# Patient Record
Sex: FEMALE | Race: WHITE | Employment: OTHER | URBAN - METROPOLITAN AREA
[De-identification: names, ages, dates, MRNs, and addresses within clinical notes are randomized per-mention and may not be internally consistent; named-entity substitution may affect disease eponyms.]

---

## 2017-03-29 RX ORDER — GEMFIBROZIL 600 MG/1
TABLET, FILM COATED ORAL
Qty: 180 TAB | Refills: 1 | Status: SHIPPED | OUTPATIENT
Start: 2017-03-29 | End: 2017-09-12 | Stop reason: SDUPTHER

## 2017-03-29 RX ORDER — MONTELUKAST SODIUM 10 MG/1
TABLET ORAL
Qty: 90 TAB | Refills: 1 | Status: SHIPPED | OUTPATIENT
Start: 2017-03-29 | End: 2017-09-15 | Stop reason: SDUPTHER

## 2017-03-30 DIAGNOSIS — J44.9 CHRONIC AIRWAY OBSTRUCTION, NOT ELSEWHERE CLASSIFIED: ICD-10-CM

## 2017-03-30 RX ORDER — FLUTICASONE PROPIONATE AND SALMETEROL 250; 50 UG/1; UG/1
1 POWDER RESPIRATORY (INHALATION) EVERY 12 HOURS
Qty: 3 INHALER | Refills: 3 | Status: SHIPPED | OUTPATIENT
Start: 2017-03-30 | End: 2019-04-16 | Stop reason: SDUPTHER

## 2017-06-21 ENCOUNTER — OFFICE VISIT (OUTPATIENT)
Dept: FAMILY MEDICINE CLINIC | Age: 72
End: 2017-06-21

## 2017-06-21 DIAGNOSIS — Z00.00 ROUTINE GENERAL MEDICAL EXAMINATION AT A HEALTH CARE FACILITY: ICD-10-CM

## 2017-06-21 DIAGNOSIS — Z13.39 SCREENING FOR ALCOHOLISM: ICD-10-CM

## 2017-06-30 VITALS
SYSTOLIC BLOOD PRESSURE: 129 MMHG | BODY MASS INDEX: 38.99 KG/M2 | HEART RATE: 56 BPM | WEIGHT: 203 LBS | DIASTOLIC BLOOD PRESSURE: 71 MMHG

## 2017-06-30 RX ORDER — LYSINE HCL 500 MG
1 TABLET ORAL DAILY
COMMUNITY
End: 2020-06-24 | Stop reason: ALTCHOICE

## 2017-06-30 NOTE — PATIENT INSTRUCTIONS
Medicare Wellness Visit, Female    The best way to live healthy is to have a healthy lifestyle by eating a well-balanced diet, exercising regularly, limiting alcohol and stopping smoking. Regular physical exams and screening tests are another way to keep healthy. Preventive exams provided by your health care provider can find health problems before they become diseases or illnesses. Preventive services including immunizations, screening tests, monitoring and exams can help you take care of your own health. All people over age 72 should have a pneumovax  and and a prevnar shot to prevent pneumonia. These are once in a lifetime unless you and your provider decide differently. It appears you haven't had either of these and should consider prevnar 13 now and then ppsv 23 a year later. All people over 65 should have a yearly flu shot and a tetanus vaccine every 10 years. You have had your flu vaccine this year and should get annually. You had Tdap in 2013 and will need a booster in 2023. A bone mass density to screen for osteoporosis or thinning of the bones should be done every 2 years after 65. [facility] you had a normal dexa scan in Aug 2016. Screening for diabetes mellitus with a blood sugar test should be done every year. Your BG has been elevated as we discussed the last two times it was checked. Recommend an A1c next visit in August with PCP. Glaucoma is a disease of the eye due to increased ocular pressure that can lead to blindness and it should be done every year by an eye professional. It appears you are due for an eye exam and should make this appt, then have results sent to us at [facility] / Dr. Jovon Samaniego office. Cardiovascular screening tests that check for elevated lipids (fatty part of blood) which can lead to heart disease and strokes should be done every 5 years. You are on cholesterol medication so this may be done more frequently.  You need to follow up with Dr. Fortunato Schwab about not taking the pravastatin at your visit. Your cholesterol in December 2015 was stable although you had a low good cholesterol - exercise can help this. Colorectal screening that evaluates for blood or polyps in your colon should be done yearly as a stool test or every five years as a flexible sigmoidoscope or every 10 years as a colonoscopy up to age 76. You had a colonoscopy 2007 and should d/w Dr. Fortunato Schwab if you need to repeat now in 2017. Breast cancer screening with a mammogram is recommended biennially  for women age 54-69. You had a mammogram in Nov 2016 and will be due again in Nov 2018. A shingles vaccine is also recommended once in a lifetime after age 61. This is a vaccine for you to consider. Your Medicare Wellness Exam is recommended annually.       Thank you

## 2017-06-30 NOTE — PROGRESS NOTES
Dr. Aleida Rios Referred KW, 1945, a 67 y.o. female for a Medicare Annual Wellness Visit (AWV). She presents to visit feeling well overall and has some papers with her regarding her sleep study and a life line screening done on 8/26/16. Will review and have scanned for chart for next PCP visit. Pt does not have an upcoming PCP visit and it has been since May of last year, so asked her to make an appt with him after today's visit. In review of meds, pt stopped taking pravastatin as it caused muscle aches and when she stopped the medicine they went away. Using CPAP for sleep apnea. Record indicates pt due for eye exam - d/w patient. Looks like she had a dexa as a part of the lifescan and it was normal.     This is a Subsequent Medicare Annual Wellness Visit providing Personalized Prevention Plan Services (PPPS) (Performed 12 months after initial AWV and PPPS )    I have reviewed the patient's medical history in detail and updated the computerized patient record.      History     Past Medical History:   Diagnosis Date    Allergic rhinitis, cause unspecified 1/17/2011    Asthma     Asthma     Bladder cancer ([facility] [address] 7/7/2010    Chronic airway obstruction, not elsewhere classified 1/17/2011    Depression 1/17/2011    Diverticulosis 7/7/2010    Encounter for long-term (current) use of other medications 1/17/2011    Essential hypertension, benign 11/30/2012    Hypercholesterolemia     Mixed hyperlipidemia 1/17/2011    Sleep apnea     CPAP      Past Surgical History:   Procedure Laterality Date    ENDOSCOPY, COLON, DIAGNOSTIC      HX CARPAL TUNNEL RELEASE      right    HX CHOLECYSTECTOMY      HX CYSTOCELE REPAIR      HX HYSTERECTOMY      HX ORTHOPAEDIC      left thumb surgery    HX UROLOGICAL      resection of bladder tumor; bladder sling    REPAIR OF RECTOCELE       Current Outpatient Prescriptions   Medication Sig Dispense Refill    fluticasone-salmeterol (ADVAIR DISKUS) 250-50 mcg/dose diskus inhaler Take 1 Puff by inhalation every twelve (12) hours. 3 Inhaler 3    montelukast (SINGULAIR) 10 mg tablet Take 1 tablet by mouth  daily 90 Tab 1    gemfibrozil (LOPID) 600 mg tablet Take 1 tablet by mouth  twice a day 180 Tab 1    clonazePAM (KLONOPIN) 1 mg tablet Take 1 Tab by mouth daily as needed. 90 Tab 1    sulfacetamide (BLEPH-10) 10 % ophthalmic solution Instill one drop in both  eyes every 3 hours for 10  days Equivalent to Bleph-10 5 mL 0    atenolol (TENORMIN) 50 mg tablet Take 1 and 1/2 tablets by  mouth daily 135 Tab 2    DULoxetine (CYMBALTA) 30 mg capsule Take 1 capsule by mouth  daily 90 Cap 2    omega-3 fatty acids-vitamin e (FISH OIL) 1,000 mg Cap Take 1 Cap by mouth two (2) times a day.  lysine (L-LYSINE) 500 mg Tab tablet Take  by mouth three (3) times daily (with meals).  aspirin 81 mg chewable tablet Take 81 mg by mouth daily.  fexofenadine (ALLEGRA) 180 mg tablet Take 180 mg by mouth daily.  albuterol (PROVENTIL, VENTOLIN) 90 mcg/Actuation inhaler Take 2 Puffs by inhalation every six (6) hours as needed.       pravastatin (PRAVACHOL) 80 mg tablet Take 1 tablet by mouth  nightly 90 Tab 1     Allergies   Allergen Reactions    Ciprofloxacin Unknown (comments)    Hydrocodone Unknown (comments)    Iodine Unknown (comments)    Lithium Unknown (comments)    Pravastatin Myalgia    Simvastatin Unknown (comments)    Wellbutrin [Bupropion Hcl] Unknown (comments)     Family History   Problem Relation Age of Onset    Diabetes Mother     Heart Disease Mother     Elevated Lipids Mother     Cancer Mother      basal cell ca    Hypertension Father     Cancer Father      basal cell ca    Cancer Sister      breast    Breast Cancer Sister      46s     Social History   Substance Use Topics    Smoking status: Never Smoker    Smokeless tobacco: Never Used    Alcohol use No      Comment: occ. wine     Patient Active Problem List   Diagnosis Code    Bladder cancer ([facility] [address] C67.9    Diverticulosis K57.90    Chronic airway obstruction, not elsewhere classified J44.9    Allergic rhinitis J30.9    Depression F32.9    Mixed hyperlipidemia E78.2    Encounter for long-term (current) use of other medications Z79.899    Essential hypertension, benign I10    Esophageal reflux K21.9    Colon polyps K63.5       Depression Risk Factor Screening:   No flowsheet data found. Alcohol Risk Factor Screening: On any occasion during the past 3 months, have you had more than 3 drinks containing alcohol? No    Do you average more than 7 drinks per week? No        Functional Ability and Level of Safety:     Hearing Loss   normal-to-mild    Activities of Daily Living   Self-care. Requires assistance with: no ADLs    ADL Assessment 6/21/2017   Feeding yourself No Help Needed   Getting from bed to chair No Help Needed   Getting dressed No Help Needed   Bathing or showering No Help Needed   Walk across the room (includes cane/walker) No Help Needed   Using the telphone No Help Needed   Taking your medications No Help Needed   Preparing meals No Help Needed   Managing money (expenses/bills) No Help Needed   Moderately strenuous housework (laundry) No Help Needed   Shopping for personal items (toiletries/medicines) No Help Needed   Shopping for groceries No Help Needed   Driving No Help Needed   Climbing a flight of stairs No Help Needed   Getting to places beyond walking distances No Help Needed         Fall Risk   Fall Risk Assessment, last 12 mths 6/21/2017   Able to walk? Yes   Fall in past 12 months? Yes   Fall with injury?  No   Number of falls in past 12 months 1   Fall Risk Score 1     Abuse Screen   Patient is not abused    Review of Systems   Not required    Physical Examination     Evaluation of Cognitive Function:  Mood/affect:  neutral  Appearance: age appropriate and within normal Limits  Family member/caregiver input: none present    No exam performed today, not a part of the AWV. Patient Care Team:  Tatiana Reddy MD as PCP - General    Advice/Referrals/Counseling   Education and counseling provided:  End-of-Life planning (with patient's consent)  Pneumococcal Vaccine  Cardiovascular screening blood test  Screening for glaucoma  Diabetes screening test      Assessment/Plan   lab results and schedule of future lab studies reviewed with patient  reviewed diet, exercise and weight control  cardiovascular risk and specific lipid/LDL goals reviewed  reviewed medications and side effects in detail. Advised pt needs PCP appt for labs - recommend A1c given last 2 BG values of 128 and 115 (one before that was 97) d/w pt   Also needs to follow up as she is not taking her statin due to myalgias; is on gemfibrozil. May need to consider alternate tx. HR in 50s - monitor and may consider dose decrease of atenolol if BP remains well-controlled as it is today. Pt given information on advanced directives and vaccines needed which appear to be prevnar 13 now, then PPSV a year later, and also Zoster. Reviewed preventative screenings recommended by medicare as noted in patient instructions. Patient verbalized understanding of information presented. Answered all of the patient's questions.       Darius Lopez, PHARMD, CDE

## 2017-07-05 RX ORDER — DULOXETIN HYDROCHLORIDE 30 MG/1
CAPSULE, DELAYED RELEASE ORAL
Qty: 90 CAP | Refills: 1 | Status: SHIPPED | OUTPATIENT
Start: 2017-07-05 | End: 2017-12-07 | Stop reason: SDUPTHER

## 2017-07-05 RX ORDER — ATENOLOL 50 MG/1
TABLET ORAL
Qty: 135 TAB | Refills: 1 | Status: SHIPPED | OUTPATIENT
Start: 2017-07-05 | End: 2017-12-07 | Stop reason: SDUPTHER

## 2017-08-02 ENCOUNTER — HOSPITAL ENCOUNTER (OUTPATIENT)
Dept: LAB | Age: 72
Discharge: HOME | End: 2017-08-02
Payer: MEDICARE

## 2017-08-02 ENCOUNTER — PATIENT OUTREACH (OUTPATIENT)
Dept: FAMILY MEDICINE CLINIC | Age: 72
End: 2017-08-02

## 2017-08-02 ENCOUNTER — OFFICE VISIT (OUTPATIENT)
Dept: FAMILY MEDICINE CLINIC | Age: 72
End: 2017-08-02

## 2017-08-02 VITALS
RESPIRATION RATE: 20 BRPM | HEIGHT: 61 IN | OXYGEN SATURATION: 92 % | SYSTOLIC BLOOD PRESSURE: 126 MMHG | WEIGHT: 198.4 LBS | HEART RATE: 57 BPM | DIASTOLIC BLOOD PRESSURE: 84 MMHG | TEMPERATURE: 99.3 F | BODY MASS INDEX: 37.46 KG/M2

## 2017-08-02 DIAGNOSIS — E78.2 MIXED HYPERLIPIDEMIA: ICD-10-CM

## 2017-08-02 DIAGNOSIS — R82.71 ASYMPTOMATIC BACTERIURIA: ICD-10-CM

## 2017-08-02 DIAGNOSIS — Z11.59 NEED FOR HEPATITIS C SCREENING TEST: ICD-10-CM

## 2017-08-02 DIAGNOSIS — R73.9 HYPERGLYCEMIA: ICD-10-CM

## 2017-08-02 DIAGNOSIS — I10 ESSENTIAL HYPERTENSION, BENIGN: Primary | ICD-10-CM

## 2017-08-02 DIAGNOSIS — K21.9 GASTROESOPHAGEAL REFLUX DISEASE WITHOUT ESOPHAGITIS: ICD-10-CM

## 2017-08-02 DIAGNOSIS — Z23 ENCOUNTER FOR IMMUNIZATION: ICD-10-CM

## 2017-08-02 DIAGNOSIS — Z13.39 SCREENING FOR ALCOHOLISM: ICD-10-CM

## 2017-08-02 DIAGNOSIS — J30.9 ALLERGIC RHINITIS, UNSPECIFIED ALLERGIC RHINITIS TRIGGER, UNSPECIFIED RHINITIS SEASONALITY: ICD-10-CM

## 2017-08-02 DIAGNOSIS — H00.015 HORDEOLUM EXTERNUM OF LEFT LOWER EYELID: ICD-10-CM

## 2017-08-02 DIAGNOSIS — Z12.11 COLON CANCER SCREENING: ICD-10-CM

## 2017-08-02 DIAGNOSIS — Z00.00 ROUTINE GENERAL MEDICAL EXAMINATION AT A HEALTH CARE FACILITY: ICD-10-CM

## 2017-08-02 DIAGNOSIS — Z78.0 POSTMENOPAUSAL: ICD-10-CM

## 2017-08-02 LAB
BILIRUB UR QL STRIP: NEGATIVE
GLUCOSE UR-MCNC: NEGATIVE MG/DL
HBA1C MFR BLD HPLC: 6 %
KETONES P FAST UR STRIP-MCNC: NEGATIVE MG/DL
PH UR STRIP: 7 [PH] (ref 4.6–8)
PROT UR QL STRIP: NEGATIVE MG/DL
SP GR UR STRIP: 1.01 (ref 1–1.03)
UA UROBILINOGEN AMB POC: NORMAL (ref 0.2–1)
URINALYSIS CLARITY POC: NORMAL
URINALYSIS COLOR POC: YELLOW
URINE BLOOD POC: NORMAL
URINE LEUKOCYTES POC: NORMAL
URINE NITRITES POC: NEGATIVE

## 2017-08-02 PROCEDURE — 87088 URINE BACTERIA CULTURE: CPT

## 2017-08-02 PROCEDURE — 87086 URINE CULTURE/COLONY COUNT: CPT

## 2017-08-02 PROCEDURE — 80053 COMPREHEN METABOLIC PANEL: CPT

## 2017-08-02 PROCEDURE — 36415 COLL VENOUS BLD VENIPUNCTURE: CPT

## 2017-08-02 PROCEDURE — 87186 SC STD MICRODIL/AGAR DIL: CPT

## 2017-08-02 PROCEDURE — 80061 LIPID PANEL: CPT

## 2017-08-02 PROCEDURE — 85025 COMPLETE CBC W/AUTO DIFF WBC: CPT

## 2017-08-02 RX ORDER — CEPHALEXIN 500 MG/1
500 CAPSULE ORAL 3 TIMES DAILY
Qty: 21 CAP | Refills: 0 | Status: SHIPPED | OUTPATIENT
Start: 2017-08-02 | End: 2017-08-09

## 2017-08-02 RX ORDER — CEPHALEXIN 500 MG/1
500 CAPSULE ORAL 3 TIMES DAILY
Qty: 21 CAP | Refills: 0 | Status: SHIPPED | OUTPATIENT
Start: 2017-08-02 | End: 2017-08-02 | Stop reason: CLARIF

## 2017-08-02 NOTE — PROGRESS NOTES
Chief Complaint   Patient presents with    Hypertension     F/U on BP.  Cholesterol Problem     F/U on cholesterol.  Eye Pain     Pt has L eye pain, swelling and redness.

## 2017-08-02 NOTE — PROGRESS NOTES
This is a Subsequent Medicare Annual Wellness Visit providing Personalized Prevention Plan Services (PPPS) (Performed 12 months after initial AWV and PPPS )    I have reviewed the patient's medical history in detail and updated the computerized patient record. History     Past Medical History:   Diagnosis Date    Allergic rhinitis, cause unspecified 1/17/2011    Asthma     Asthma     Bladder cancer ([facility] [address] 7/7/2010    Chronic airway obstruction, not elsewhere classified 1/17/2011    Depression 1/17/2011    Diverticulosis 7/7/2010    Encounter for long-term (current) use of other medications 1/17/2011    Essential hypertension, benign 11/30/2012    Hypercholesterolemia     Mixed hyperlipidemia 1/17/2011    Sleep apnea     CPAP      Past Surgical History:   Procedure Laterality Date    ENDOSCOPY, COLON, DIAGNOSTIC      HX CARPAL TUNNEL RELEASE      right    HX CHOLECYSTECTOMY      HX CYSTOCELE REPAIR      HX HYSTERECTOMY      HX ORTHOPAEDIC      left thumb surgery    HX UROLOGICAL      resection of bladder tumor; bladder sling    REPAIR OF RECTOCELE       Current Outpatient Prescriptions   Medication Sig Dispense Refill    cephALEXin (KEFLEX) 500 mg capsule Take 1 Cap by mouth three (3) times daily for 7 days. 21 Cap 0    atenolol (TENORMIN) 50 mg tablet Take 1 and 1/2 tablets by  mouth daily 135 Tab 1    DULoxetine (CYMBALTA) 30 mg capsule Take 1 capsule by mouth  daily 90 Cap 1    Calcium Carbonate-Vit D3-Min 600 mg calcium- 400 unit tab Take 1 Tab by mouth daily.  fluticasone-salmeterol (ADVAIR DISKUS) 250-50 mcg/dose diskus inhaler Take 1 Puff by inhalation every twelve (12) hours. 3 Inhaler 3    montelukast (SINGULAIR) 10 mg tablet Take 1 tablet by mouth  daily 90 Tab 1    gemfibrozil (LOPID) 600 mg tablet Take 1 tablet by mouth  twice a day 180 Tab 1    clonazePAM (KLONOPIN) 1 mg tablet Take 1 Tab by mouth daily as needed.  90 Tab 1    omega-3 fatty acids-vitamin e (FISH OIL) 1,000 mg Cap Take 1 Cap by mouth two (2) times a day.  lysine (L-LYSINE) 500 mg Tab tablet Take  by mouth three (3) times daily (with meals).  aspirin 81 mg chewable tablet Take 81 mg by mouth daily.  fexofenadine (ALLEGRA) 180 mg tablet Take 180 mg by mouth daily.  sulfacetamide (BLEPH-10) 10 % ophthalmic solution Instill one drop in both  eyes every 3 hours for 10  days Equivalent to Bleph-10 5 mL 0    albuterol (PROVENTIL, VENTOLIN) 90 mcg/Actuation inhaler Take 2 Puffs by inhalation every six (6) hours as needed. Allergies   Allergen Reactions    Ciprofloxacin Unknown (comments)    Hydrocodone Unknown (comments)    Iodine Unknown (comments)    Lithium Unknown (comments)    Pravastatin Myalgia    Simvastatin Unknown (comments)    Wellbutrin [Bupropion Hcl] Unknown (comments)     Family History   Problem Relation Age of Onset    Diabetes Mother     Heart Disease Mother     Elevated Lipids Mother     Cancer Mother      basal cell ca    Hypertension Father     Cancer Father      basal cell ca    Cancer Sister      breast    Breast Cancer Sister      46s     Social History   Substance Use Topics    Smoking status: Never Smoker    Smokeless tobacco: Never Used    Alcohol use No      Comment: occ. wine     Patient Active Problem List   Diagnosis Code    [facility]    Diverticulosis K57.90    Chronic airway obstruction, not elsewhere classified J44.9    Allergic rhinitis J30.9    Depression F32.9    Mixed hyperlipidemia E78.2    Encounter for long-term (current) use of other medications Z79.899    Essential hypertension, benign I10    Esophageal reflux K21.9    Colon polyps K63.5       Depression Risk Factor Screening:   No flowsheet data found. Alcohol Risk Factor Screening: On any occasion during the past 3 months, have you had more than 3 drinks containing alcohol? No    Do you average more than 7 drinks per week?   No        Functional Ability and Level of Safety:     Hearing Loss   normal-to-mild    Activities of Daily Living   Self-care. Requires assistance with: no ADLs    Fall Risk   Fall Risk Assessment, last 12 mths 8/2/2017   Able to walk? Yes   Fall in past 12 months? No   Fall with injury? -   Number of falls in past 12 months -   Fall Risk Score -     Abuse Screen   Patient is not abused    Review of Systems   Not required    Physical Examination     Evaluation of Cognitive Function:  Mood/affect:  neutral  Appearance: age appropriate  Family member/caregiver input:here alone    No exam performed today, done by Dr Aditya Rojas. Patient Care Team:  Marcela Ybarra MD as PCP - General Eduar Dias (Urology)   Renetta Walker 93 Nurse Navigator    Advice/Referrals/Counseling   Education and counseling provided:  End-of-Life planning (with patient's consent) States she has an Advanced Directive, needs to bring in copy to be scanned into chart. Declined Zoster vaccine, over due for eye exam      Assessment/Plan   As directed by Dr Aditya Rojas.

## 2017-08-02 NOTE — MR AVS SNAPSHOT
Visit Information Date & Time Provider Department Dept. Phone Encounter #  
 8/2/2017 11:45 AM Eleuterio Adame [phone] [id number] Follow-up Instructions Return in about 3 months (around 11/2/2017). Upcoming Health Maintenance Date Due Hepatitis C Screening 1945 GLAUCOMA SCREENING Q2Y 3/21/2010 Pneumococcal 65+ High/Highest Risk (1 of 2 - PCV13) 3/21/2010 MEDICARE YEARLY EXAM 12/23/2016 COLONOSCOPY 6/12/2017 BREAST CANCER SCRN MAMMOGRAM 11/14/2018 DTaP/Tdap/Td series (2 - Td) 12/19/2023 Allergies as of 8/2/2017  Review Complete On: 8/2/2017 By: Geri Bauman Severity Noted Reaction Type Reactions Ciprofloxacin  01/17/2011    Unknown (comments) Hydrocodone  01/17/2011    Unknown (comments) Iodine  01/17/2011    Unknown (comments) Lithium  01/17/2011    Unknown (comments) Pravastatin  06/30/2017    Myalgia Simvastatin  01/17/2011    Unknown (comments) Wellbutrin [Bupropion Hcl]  01/17/2011    Unknown (comments) Current Immunizations  Reviewed on 6/30/2017 Name Date Influenza High Dose Vaccine PF 9/13/2016, 12/23/2015 Influenza Vaccine 11/30/2012 Influenza Vaccine PF 11/15/2013 Tdap 12/19/2013 Not reviewed this visit You Were Diagnosed With   
  
 Codes Comments Essential hypertension, benign    -  Primary ICD-10-CM: I10 
ICD-9-CM: 401.1 Gastroesophageal reflux disease without esophagitis     ICD-10-CM: K21.9 ICD-9-CM: 530.81 Mixed hyperlipidemia     ICD-10-CM: E78.2 ICD-9-CM: 272.2 Need for hepatitis C screening test     ICD-10-CM: Z11.59 
ICD-9-CM: V73.89 Allergic rhinitis, unspecified allergic rhinitis trigger, unspecified rhinitis seasonality     ICD-10-CM: J30.9 ICD-9-CM: 477.9 Hyperglycemia     ICD-10-CM: R73.9 ICD-9-CM: 790.29  Hordeolum externum of left lower eyelid     ICD-10-CM: H00.015 
ICD-9-CM: 373.11   
 Colon cancer screening     ICD-10-CM: Z12.11 ICD-9-CM: V76.51 Vitals BP Pulse Temp Resp Height(growth percentile) Weight(growth percentile) 126/84 (BP 1 Location: Left arm, BP Patient Position: Sitting) (!) 57 99.3 °F (37.4 °C) (Oral) 20 5' 0.5\" (1.537 m) 198 lb 6.4 oz (90 kg) SpO2 BMI OB Status Smoking Status 92% 38.11 kg/m2 Hysterectomy Never Smoker Vitals History BMI and BSA Data Body Mass Index Body Surface Area  
 38.11 kg/m 2 1.96 m 2 Preferred Pharmacy Pharmacy Name Phone [id number]- [place], [place] - [address] [phone] Your Updated Medication List  
  
   
This list is accurate as of: 8/2/17 12:09 PM.  Always use your most recent med list.  
  
  
  
  
 albuterol 90 mcg/actuation inhaler Commonly known as:  Kuldeep Joe Take 2 Puffs by inhalation every six (6) hours as needed. aspirin 81 mg chewable tablet Take 81 mg by mouth daily. atenolol 50 mg tablet Commonly known as:  TENORMIN Take 1 and 1/2 tablets by  mouth daily Calcium Carbonate-Vit D3-Min 600 mg calcium- 400 unit Tab Take 1 Tab by mouth daily. cephALEXin 500 mg capsule Commonly known as:  Polo Blossom Take 1 Cap by mouth three (3) times daily for 7 days. clonazePAM 1 mg tablet Commonly known as:  Cherylyn Rafter Take 1 Tab by mouth daily as needed. DULoxetine 30 mg capsule Commonly known as:  CYMBALTA Take 1 capsule by mouth  daily  
  
 fexofenadine 180 mg tablet Commonly known as:  Quinwood Aurelia Take 180 mg by mouth daily. FISH OIL 1,000 mg Cap Generic drug:  omega-3 fatty acids-vitamin e Take 1 Cap by mouth two (2) times a day. fluticasone-salmeterol 250-50 mcg/dose diskus inhaler Commonly known as:  ADVAIR DISKUS Take 1 Puff by inhalation every twelve (12) hours. gemfibrozil 600 mg tablet Commonly known as:  LOPID  
 Take 1 tablet by mouth  twice a day  
  
 lysine 500 mg Tab tablet Commonly known as:  L-LYSINE Take  by mouth three (3) times daily (with meals). montelukast 10 mg tablet Commonly known as:  SINGULAIR Take 1 tablet by mouth  daily  
  
 sulfacetamide 10 % ophthalmic solution Commonly known as:  BLEPH-10 Instill one drop in both  eyes every 3 hours for 10  days Equivalent to Bleph-10 Prescriptions Sent to Pharmacy Refills  
 cephALEXin (KEFLEX) 500 mg capsule 0 Sig: Take 1 Cap by mouth three (3) times daily for 7 days. Class: Normal  
 Pharmacy: [facility] [address] [place] [address] [address]  #: [phone] Route: Oral  
  
We Performed the Following AMB POC HEMOGLOBIN A1C [65836 CPT(R)] AMB POC URINALYSIS DIP STICK AUTO W/O MICRO [63514 CPT(R)] CBC WITH AUTOMATED DIFF [52708 CPT(R)] LIPID PANEL [15724 CPT(R)] METABOLIC PANEL, COMPREHENSIVE [13359 CPT(R)] REFERRAL TO GASTROENTEROLOGY [VGN11 Custom] Comments:  
 Please evaluate patient for CRS Follow-up Instructions Return in about 3 months (around 11/2/2017). Referral Information Referral ID Referred By Referred To  
  
 [id number] Walter ESCAMILLA [facility] [address] Visits Status Start Date End Date 1 New Request 8/2/17 8/2/18 If your referral has a status of pending review or denied, additional information will be sent to support the outcome of this decision. Introducing [facility]! Ron Shaver introduces [employer] patient portal. Now you can access parts of your medical record, email your doctor's office, and request medication refills online. 1. In your internet browser, go to [email or web address] 2. Click on the First Time User? Click Here link in the Sign In box.  You will see the New Member Sign Up page. 3. Enter your [employer] Access Code exactly as it appears below. You will not need to use this code after youve completed the sign-up process. If you do not sign up before the expiration date, you must request a new code. · [employer] Access Code: [facility] Expires: 9/28/2017  1:43 PM 
 
4. Enter the last four digits of your Social Security Number (xxxx) and Date of Birth (mm/dd/yyyy) as indicated and click Submit. You will be taken to the next sign-up page. 5. Create a [employer] ID. This will be your [employer] login ID and cannot be changed, so think of one that is secure and easy to remember. 6. Create a [employer] password. You can change your password at any time. 7. Enter your Password Reset Question and Answer. This can be used at a later time if you forget your password. 8. Enter your e-mail address. You will receive e-mail notification when new information is available in [address] 9. Click Sign Up. You can now view and download portions of your medical record. 10. Click the Download Summary menu link to download a portable copy of your medical information. If you have questions, please visit the Frequently Asked Questions section of the [email or web address] website. Remember, [employer] is NOT to be used for urgent needs. For medical emergencies, dial 911. Now available from your iPhone and Android! Please provide this summary of care documentation to your next provider. Your primary care clinician is listed as Deborah Sosa. If you have any questions after today's visit, please call [phone].

## 2017-08-02 NOTE — PATIENT INSTRUCTIONS
Medicare Part B Preventive Services Limitations Recommendation Scheduled   Bone Mass Measurement  (age 72 & older, biennial) Requires diagnosis related to osteoporosis or estrogen deficiency. Biennial benefit unless patient has history of long-term glucocorticoid tx or baseline is needed because initial test was by other method  Ordered today   Cardiovascular Screening Blood Tests (every 5 years)  Total cholesterol, HDL, Triglycerides Order as a panel if possible  Done 12/2015   Colorectal Cancer Screening  -Fecal occult blood test (annual)  -Flexible sigmoidoscopy (5y)  -Screening colonoscopy (10y)  -Barium Enema   Done 6/2007-due 6/2017   Counseling to Prevent Tobacco Use (up to 8 sessions per year)  - Counseling greater than 3 and up to 10 minutes  - Counseling greater than 10 minutes Patients must be asymptomatic of tobacco-related conditions to receive as preventive service  Non smoker   Diabetes Screening Tests (at least every 3 years, Medicare covers annually or at 6-month intervals for prediabetic patients)    Fasting blood sugar (FBS) or glucose tolerance test (GTT) Patient must be diagnosed with one of the following:  -Hypertension, Dyslipidemia, obesity, previous impaired FBS or GTT  Or any two of the following: overweight, FH of diabetes, age ? 72, history of gestational diabetes, birth of baby weighing more than 9 pounds  Last Blood glucose was 120 on 12/2015, normal is . Diabetes Self-Management Training (DSMT) (no USPSTF recommendation) Requires referral by treating physician for patient with diabetes or renal disease. 10 hours of initial DSMT session of no less than 30 minutes each in a continuous 12-month period. 2 hours of follow-up DSMT in subsequent years.   N/A   Glaucoma Screening (no USPSTF recommendation) Diabetes mellitus, family history, , age 48 or over,  American, age 72 or over  Over due with Dr Gilbert Franks group   Human Immunodeficiency Virus (HIV) Screening (annually for increased risk patients)  HIV-1 and HIV-2 by EIA, KIM, rapid antibody test, or oral mucosa transudate Patient must be at increased risk for HIV infection per USPSTF guidelines or pregnant. Tests covered annually for patients at increased risk. Pregnant patients may receive up to 3 test during pregnancy. Not high risk   Medical Nutrition Therapy (MNT) (for diabetes or renal disease not recommended schedule) Requires referral by treating physician for patient with diabetes or renal disease. Can be provided in same year as diabetes self-management training (DSMT), and CMS recommends medical nutrition therapy take place after DSMT. Up to 3 hours for initial year and 2 hours in subsequent years. N/A   Shingles Vaccination A shingles vaccine is also recommended once in a lifetime after age 61  Declined today   Seasonal Influenza Vaccination (annually)   Fall 2017   Pneumococcal Vaccination (once after 72)   Ordered today   Hepatitis B Vaccinations (if medium/high risk) Medium/high risk factors:  End-stage renal disease,  Hemophiliacs who received Factor VIII or IX concentrates, Clients of institutions for the mentally retarded, Persons who live in the same house as a HepB virus carrier, Homosexual men, Illicit injectable drug abusers. Ordered today   Screening Mammography (biennial age 54-69) Annually (age 36 or over)  Done 11/2016   Screening Pap Tests and Pelvic Examination (up to age 79 and after 79 if unknown history or abnormal study last 10 years) Every 25 months except high risk  Hysterectomy   Ultrasound Screening for Abdominal Aortic Aneurysm (AAA) (once) Patient must be referred through IPPE and not have had a screening for abdominal aortic aneurysm before under Medicare.   Limited to patients who meet one of the following criteria:  - Men who are 73-68 years old and have smoked more than 100 cigarettes in their lifetime.  -Anyone with a FH of AAA  -Anyone recommended for screening by USPSTF  N/A

## 2017-08-02 NOTE — PROGRESS NOTES
HISTORY OF PRESENT ILLNESS  Romelia Hood is a 67 y.o. female. f/u hbp,chl,copd,ar,depression ,new l eye redness  Hypertension    The history is provided by the patient. This is a chronic problem. The problem has not changed since onset. Associated symptoms include malaise/fatigue. Pertinent negatives include no chest pain, no orthopnea, no blurred vision, no headaches, no peripheral edema and no shortness of breath. Cholesterol Problem   This is a chronic problem. The problem occurs daily. The problem has not changed since onset. Pertinent negatives include no chest pain, no abdominal pain, no headaches and no shortness of breath. Eye Pain    The current episode started 2 days ago. The problem occurs daily. The problem has not changed since onset. The left eye is affected. The pain is at a severity of 3/10. The pain is mild. Associated symptoms include discharge, photophobia, eye redness and pain. Pertinent negatives include no blurred vision and no fever. Breathing Problem   The history is provided by the patient. This is a chronic problem. The problem has not changed since onset. Associated symptoms include cough. Pertinent negatives include no fever, no headaches, no ear pain, no sputum production, no orthopnea, no chest pain and no abdominal pain. Review of Systems   Constitutional: Positive for malaise/fatigue. Negative for fever. HENT: Negative for ear pain. Eyes: Positive for photophobia, pain, discharge and redness. Negative for blurred vision. Respiratory: Positive for cough. Negative for sputum production and shortness of breath. Cardiovascular: Negative for chest pain and orthopnea. Gastrointestinal: Negative for abdominal pain. Neurological: Negative for headaches. Physical Exam   Constitutional: She is oriented to person, place, and time. She appears well-developed and well-nourished. HENT:   Head: Normocephalic.    Right Ear: External ear normal.   Left Ear: External ear normal.   Nose: Nose normal.   Mouth/Throat: Oropharynx is clear and moist.   Eyes: Pupils are equal, round, and reactive to light. Mild conctivitis l eye ,pointing small lower lid stye,lower lisd edema   Neck: Normal range of motion. Neck supple. Cardiovascular: Normal rate and regular rhythm. Pulmonary/Chest: Effort normal.   Abdominal: Soft. Bowel sounds are normal.   Neurological: She is alert and oriented to person, place, and time. Skin: Skin is warm and dry. ASSESSMENT and PLAN  Diagnoses and all orders for this visit:    1. Essential hypertension, benign  -     METABOLIC PANEL, COMPREHENSIVE  -     CBC WITH AUTOMATED DIFF  -     AMB POC URINALYSIS DIP STICK AUTO W/O MICRO    2. Gastroesophageal reflux disease without esophagitis    3. Mixed hyperlipidemia  -     LIPID PANEL    4. Need for hepatitis C screening test    5. Allergic rhinitis, unspecified allergic rhinitis trigger, unspecified rhinitis seasonality    6. Hyperglycemia  -     AMB POC HEMOGLOBIN A1C    7. Hordeolum externum of left lower eyelid  -     cephALEXin (KEFLEX) 500 mg capsule; Take 1 Cap by mouth three (3) times daily for 7 days. 8. Colon cancer screening  -     REFERRAL TO GASTROENTEROLOGY    9. Routine general medical examination at a health care facility  -     Dexa Bone Density Study Axial (LGL7959); Future    10. Screening for alcoholism    11. Postmenopausal  -     Dexa Bone Density Study Axial ([id number]); Future    12. Encounter for immunization  -     Pneumococcal Polysaccharide vaccine, 23-Valent, Adult or Immunocompromised      Follow-up Disposition:  Return in about 3 months (around 11/2/2017).

## 2017-08-04 LAB
ALBUMIN SERPL-MCNC: 4.8 G/DL (ref 3.5–4.8)
ALBUMIN/GLOB SERPL: 1.8 {RATIO} (ref 1.2–2.2)
ALP SERPL-CCNC: 64 IU/L (ref 39–117)
ALT SERPL-CCNC: 25 IU/L (ref 0–32)
AST SERPL-CCNC: 29 IU/L (ref 0–40)
BACTERIA UR CULT: ABNORMAL
BASOPHILS # BLD AUTO: 0 X10E3/UL (ref 0–0.2)
BASOPHILS NFR BLD AUTO: 0 %
BILIRUB SERPL-MCNC: 0.5 MG/DL (ref 0–1.2)
BUN SERPL-MCNC: 17 MG/DL (ref 8–27)
BUN/CREAT SERPL: 24 (ref 12–28)
CALCIUM SERPL-MCNC: 10.4 MG/DL (ref 8.7–10.3)
CHLORIDE SERPL-SCNC: 100 MMOL/L (ref 96–106)
CHOLEST SERPL-MCNC: 219 MG/DL (ref 100–199)
CO2 SERPL-SCNC: 21 MMOL/L (ref 18–29)
CREAT SERPL-MCNC: 0.72 MG/DL (ref 0.57–1)
EOSINOPHIL # BLD AUTO: 0 X10E3/UL (ref 0–0.4)
EOSINOPHIL NFR BLD AUTO: 0 %
ERYTHROCYTE [DISTWIDTH] IN BLOOD BY AUTOMATED COUNT: 14.3 % (ref 12.3–15.4)
GLOBULIN SER CALC-MCNC: 2.7 G/DL (ref 1.5–4.5)
GLUCOSE SERPL-MCNC: 99 MG/DL (ref 65–99)
HCT VFR BLD AUTO: 41 % (ref 34–46.6)
HDLC SERPL-MCNC: 31 MG/DL
HGB BLD-MCNC: 13.9 G/DL (ref 11.1–15.9)
IMM GRANULOCYTES # BLD: 0.1 X10E3/UL (ref 0–0.1)
IMM GRANULOCYTES NFR BLD: 2 %
INTERPRETATION, 910389: NORMAL
LDLC SERPL CALC-MCNC: 148 MG/DL (ref 0–99)
LYMPHOCYTES # BLD AUTO: 2.9 X10E3/UL (ref 0.7–3.1)
LYMPHOCYTES NFR BLD AUTO: 49 %
MCH RBC QN AUTO: 29.1 PG (ref 26.6–33)
MCHC RBC AUTO-ENTMCNC: 33.9 G/DL (ref 31.5–35.7)
MCV RBC AUTO: 86 FL (ref 79–97)
MONOCYTES # BLD AUTO: 0.5 X10E3/UL (ref 0.1–0.9)
MONOCYTES NFR BLD AUTO: 8 %
NEUTROPHILS # BLD AUTO: 2.4 X10E3/UL (ref 1.4–7)
NEUTROPHILS NFR BLD AUTO: 41 %
PLATELET # BLD AUTO: 140 X10E3/UL (ref 150–379)
POTASSIUM SERPL-SCNC: 5.1 MMOL/L (ref 3.5–5.2)
PROT SERPL-MCNC: 7.5 G/DL (ref 6–8.5)
RBC # BLD AUTO: 4.77 X10E6/UL (ref 3.77–5.28)
SODIUM SERPL-SCNC: 143 MMOL/L (ref 134–144)
TRIGL SERPL-MCNC: 200 MG/DL (ref 0–149)
VLDLC SERPL CALC-MCNC: 40 MG/DL (ref 5–40)
WBC # BLD AUTO: 5.9 X10E3/UL (ref 3.4–10.8)

## 2017-08-09 DIAGNOSIS — N30.90 CYSTITIS: Primary | ICD-10-CM

## 2017-08-09 RX ORDER — AMOXICILLIN AND CLAVULANATE POTASSIUM 875; 125 MG/1; MG/1
1 TABLET, FILM COATED ORAL 2 TIMES DAILY WITH MEALS
Qty: 14 TAB | Refills: 0 | Status: SHIPPED | OUTPATIENT
Start: 2017-08-09 | End: 2017-08-16

## 2017-08-15 ENCOUNTER — HOSPITAL ENCOUNTER (OUTPATIENT)
Dept: BONE DENSITY | Age: 72
Discharge: HOME | End: 2017-08-15
Attending: FAMILY MEDICINE

## 2017-08-15 DIAGNOSIS — Z00.00 ROUTINE GENERAL MEDICAL EXAMINATION AT A HEALTH CARE FACILITY: ICD-10-CM

## 2017-08-15 DIAGNOSIS — Z78.0 POSTMENOPAUSAL: ICD-10-CM

## 2017-08-30 ENCOUNTER — HOSPITAL ENCOUNTER (OUTPATIENT)
Dept: BONE DENSITY | Age: 72
Discharge: HOME | End: 2017-08-30
Attending: FAMILY MEDICINE
Payer: MEDICARE

## 2017-08-30 PROCEDURE — 77080 DXA BONE DENSITY AXIAL: CPT

## 2017-09-13 RX ORDER — CLONAZEPAM 1 MG/1
1 TABLET ORAL
Qty: 90 TAB | Refills: 1 | Status: SHIPPED | OUTPATIENT
Start: 2017-09-13 | End: 2018-08-08 | Stop reason: SDUPTHER

## 2017-09-13 RX ORDER — GEMFIBROZIL 600 MG/1
TABLET, FILM COATED ORAL
Qty: 180 TAB | Refills: 1 | Status: SHIPPED | OUTPATIENT
Start: 2017-09-13 | End: 2018-06-13 | Stop reason: SDUPTHER

## 2017-09-15 RX ORDER — MONTELUKAST SODIUM 10 MG/1
TABLET ORAL
Qty: 90 TAB | Refills: 3 | Status: SHIPPED | OUTPATIENT
Start: 2017-09-15 | End: 2018-12-02 | Stop reason: SDUPTHER

## 2017-10-19 ENCOUNTER — APPOINTMENT (OUTPATIENT)
Dept: NUCLEAR MEDICINE | Age: 72
DRG: 313 | End: 2017-10-19
Attending: EMERGENCY MEDICINE
Payer: MEDICARE

## 2017-10-19 ENCOUNTER — APPOINTMENT (OUTPATIENT)
Dept: GENERAL RADIOLOGY | Age: 72
DRG: 313 | End: 2017-10-19
Attending: EMERGENCY MEDICINE
Payer: MEDICARE

## 2017-10-19 ENCOUNTER — APPOINTMENT (OUTPATIENT)
Dept: CT IMAGING | Age: 72
DRG: 313 | End: 2017-10-19
Attending: EMERGENCY MEDICINE
Payer: MEDICARE

## 2017-10-19 ENCOUNTER — HOSPITAL ENCOUNTER (INPATIENT)
Age: 72
LOS: 1 days | Discharge: HOME | DRG: 313 | End: 2017-10-20
Attending: EMERGENCY MEDICINE | Admitting: HOSPITALIST
Payer: MEDICARE

## 2017-10-19 ENCOUNTER — APPOINTMENT (OUTPATIENT)
Dept: ULTRASOUND IMAGING | Age: 72
DRG: 313 | End: 2017-10-19
Attending: HOSPITALIST
Payer: MEDICARE

## 2017-10-19 DIAGNOSIS — I26.99 OTHER PULMONARY EMBOLISM WITHOUT ACUTE COR PULMONALE, UNSPECIFIED CHRONICITY (HCC): ICD-10-CM

## 2017-10-19 DIAGNOSIS — R07.89 OTHER CHEST PAIN: ICD-10-CM

## 2017-10-19 DIAGNOSIS — I26.99 OTHER ACUTE PULMONARY EMBOLISM WITHOUT ACUTE COR PULMONALE (HCC): Primary | ICD-10-CM

## 2017-10-19 DIAGNOSIS — K21.9 GASTROESOPHAGEAL REFLUX DISEASE WITHOUT ESOPHAGITIS: ICD-10-CM

## 2017-10-19 DIAGNOSIS — N30.00 ACUTE CYSTITIS WITHOUT HEMATURIA: ICD-10-CM

## 2017-10-19 DIAGNOSIS — I10 ESSENTIAL HYPERTENSION, BENIGN: ICD-10-CM

## 2017-10-19 LAB
ALBUMIN SERPL-MCNC: 4.1 G/DL (ref 3.5–5)
ALBUMIN/GLOB SERPL: 1.1 {RATIO} (ref 1.1–2.2)
ALP SERPL-CCNC: 63 U/L (ref 45–117)
ALT SERPL-CCNC: 33 U/L (ref 12–78)
ANION GAP SERPL CALC-SCNC: 5 MMOL/L (ref 5–15)
APPEARANCE UR: ABNORMAL
AST SERPL-CCNC: 23 U/L (ref 15–37)
ATRIAL RATE: 55 BPM
BACTERIA URNS QL MICRO: ABNORMAL /HPF
BASOPHILS # BLD: 0 K/UL (ref 0–0.1)
BASOPHILS NFR BLD: 0 % (ref 0–1)
BILIRUB SERPL-MCNC: 0.5 MG/DL (ref 0.2–1)
BILIRUB UR QL: NEGATIVE
BUN SERPL-MCNC: 17 MG/DL (ref 6–20)
BUN/CREAT SERPL: 25 (ref 12–20)
CALCIUM SERPL-MCNC: 9.3 MG/DL (ref 8.5–10.1)
CALCULATED P AXIS, ECG09: 44 DEGREES
CALCULATED R AXIS, ECG10: -9 DEGREES
CALCULATED T AXIS, ECG11: 3 DEGREES
CHLORIDE SERPL-SCNC: 102 MMOL/L (ref 97–108)
CK SERPL-CCNC: 37 U/L (ref 26–192)
CO2 SERPL-SCNC: 30 MMOL/L (ref 21–32)
COLOR UR: ABNORMAL
CREAT SERPL-MCNC: 0.68 MG/DL (ref 0.55–1.02)
D DIMER PPP FEU-MCNC: 1.21 MG/L FEU (ref 0–0.65)
DIAGNOSIS, 93000: NORMAL
DIFFERENTIAL METHOD BLD: ABNORMAL
EOSINOPHIL # BLD: 0 K/UL (ref 0–0.4)
EOSINOPHIL NFR BLD: 0 % (ref 0–7)
EPITH CASTS URNS QL MICRO: ABNORMAL /LPF
ERYTHROCYTE [DISTWIDTH] IN BLOOD BY AUTOMATED COUNT: 13.5 % (ref 11.5–14.5)
GLOBULIN SER CALC-MCNC: 3.8 G/DL (ref 2–4)
GLUCOSE SERPL-MCNC: 127 MG/DL (ref 65–100)
GLUCOSE UR STRIP.AUTO-MCNC: NEGATIVE MG/DL
HCT VFR BLD AUTO: 40.5 % (ref 35–47)
HGB BLD-MCNC: 13.5 G/DL (ref 11.5–16)
HGB UR QL STRIP: ABNORMAL
INR PPP: 1 (ref 0.9–1.1)
KETONES UR QL STRIP.AUTO: NEGATIVE MG/DL
LEUKOCYTE ESTERASE UR QL STRIP.AUTO: ABNORMAL
LYMPHOCYTES # BLD: 1.6 K/UL (ref 0.8–3.5)
LYMPHOCYTES NFR BLD: 48 % (ref 12–49)
MCH RBC QN AUTO: 29.2 PG (ref 26–34)
MCHC RBC AUTO-ENTMCNC: 33.3 G/DL (ref 30–36.5)
MCV RBC AUTO: 87.7 FL (ref 80–99)
MONOCYTES # BLD: 0.3 K/UL (ref 0–1)
MONOCYTES NFR BLD: 10 % (ref 5–13)
NEUTS SEG # BLD: 1.3 K/UL (ref 1.8–8)
NEUTS SEG NFR BLD: 42 % (ref 32–75)
NITRITE UR QL STRIP.AUTO: NEGATIVE
OTHER,OTHU: ABNORMAL
P-R INTERVAL, ECG05: 200 MS
PH UR STRIP: 6.5 [PH] (ref 5–8)
PLATELET # BLD AUTO: 96 K/UL (ref 150–400)
POTASSIUM SERPL-SCNC: 4 MMOL/L (ref 3.5–5.1)
PROT SERPL-MCNC: 7.9 G/DL (ref 6.4–8.2)
PROT UR STRIP-MCNC: NEGATIVE MG/DL
PROTHROMBIN TIME: 10.4 SEC (ref 9–11.1)
Q-T INTERVAL, ECG07: 408 MS
QRS DURATION, ECG06: 88 MS
QTC CALCULATION (BEZET), ECG08: 390 MS
RBC # BLD AUTO: 4.62 M/UL (ref 3.8–5.2)
RBC #/AREA URNS HPF: ABNORMAL /HPF (ref 0–5)
RBC MORPH BLD: ABNORMAL
SODIUM SERPL-SCNC: 137 MMOL/L (ref 136–145)
SP GR UR REFRACTOMETRY: 1.01 (ref 1–1.03)
TROPONIN I SERPL-MCNC: <0.04 NG/ML
UA: UC IF INDICATED,UAUC: ABNORMAL
UROBILINOGEN UR QL STRIP.AUTO: 0.2 EU/DL (ref 0.2–1)
VENTRICULAR RATE, ECG03: 55 BPM
WBC # BLD AUTO: 3.2 K/UL (ref 3.6–11)
WBC URNS QL MICRO: >100 /HPF (ref 0–4)

## 2017-10-19 PROCEDURE — 74011250636 HC RX REV CODE- 250/636: Performed by: EMERGENCY MEDICINE

## 2017-10-19 PROCEDURE — 81001 URINALYSIS AUTO W/SCOPE: CPT | Performed by: EMERGENCY MEDICINE

## 2017-10-19 PROCEDURE — 99285 EMERGENCY DEPT VISIT HI MDM: CPT

## 2017-10-19 PROCEDURE — 80053 COMPREHEN METABOLIC PANEL: CPT | Performed by: EMERGENCY MEDICINE

## 2017-10-19 PROCEDURE — 82550 ASSAY OF CK (CPK): CPT | Performed by: EMERGENCY MEDICINE

## 2017-10-19 PROCEDURE — 74011250637 HC RX REV CODE- 250/637: Performed by: HOSPITALIST

## 2017-10-19 PROCEDURE — 87086 URINE CULTURE/COLONY COUNT: CPT | Performed by: EMERGENCY MEDICINE

## 2017-10-19 PROCEDURE — 93005 ELECTROCARDIOGRAM TRACING: CPT

## 2017-10-19 PROCEDURE — 96375 TX/PRO/DX INJ NEW DRUG ADDON: CPT

## 2017-10-19 PROCEDURE — 84484 ASSAY OF TROPONIN QUANT: CPT | Performed by: EMERGENCY MEDICINE

## 2017-10-19 PROCEDURE — 36415 COLL VENOUS BLD VENIPUNCTURE: CPT | Performed by: EMERGENCY MEDICINE

## 2017-10-19 PROCEDURE — 85379 FIBRIN DEGRADATION QUANT: CPT | Performed by: EMERGENCY MEDICINE

## 2017-10-19 PROCEDURE — 71020 XR CHEST PA LAT: CPT

## 2017-10-19 PROCEDURE — A9558 XE133 XENON 10MCI: HCPCS

## 2017-10-19 PROCEDURE — 85025 COMPLETE CBC W/AUTO DIFF WBC: CPT | Performed by: EMERGENCY MEDICINE

## 2017-10-19 PROCEDURE — 93970 EXTREMITY STUDY: CPT

## 2017-10-19 PROCEDURE — 65660000000 HC RM CCU STEPDOWN

## 2017-10-19 PROCEDURE — 74011000258 HC RX REV CODE- 258: Performed by: EMERGENCY MEDICINE

## 2017-10-19 PROCEDURE — 85610 PROTHROMBIN TIME: CPT | Performed by: EMERGENCY MEDICINE

## 2017-10-19 PROCEDURE — 94761 N-INVAS EAR/PLS OXIMETRY MLT: CPT

## 2017-10-19 PROCEDURE — 87186 SC STD MICRODIL/AGAR DIL: CPT | Performed by: EMERGENCY MEDICINE

## 2017-10-19 PROCEDURE — 87077 CULTURE AEROBIC IDENTIFY: CPT | Performed by: EMERGENCY MEDICINE

## 2017-10-19 PROCEDURE — 96374 THER/PROPH/DIAG INJ IV PUSH: CPT

## 2017-10-19 RX ORDER — MONTELUKAST SODIUM 10 MG/1
10 TABLET ORAL DAILY
Status: DISCONTINUED | OUTPATIENT
Start: 2017-10-20 | End: 2017-10-20 | Stop reason: HOSPADM

## 2017-10-19 RX ORDER — ACETAMINOPHEN 325 MG/1
650 TABLET ORAL
Status: DISCONTINUED | OUTPATIENT
Start: 2017-10-19 | End: 2017-10-20 | Stop reason: HOSPADM

## 2017-10-19 RX ORDER — OXYCODONE HYDROCHLORIDE 5 MG/1
5 TABLET ORAL
Status: DISCONTINUED | OUTPATIENT
Start: 2017-10-19 | End: 2017-10-20 | Stop reason: HOSPADM

## 2017-10-19 RX ORDER — ONDANSETRON 2 MG/ML
4 INJECTION INTRAMUSCULAR; INTRAVENOUS
Status: DISCONTINUED | OUTPATIENT
Start: 2017-10-19 | End: 2017-10-20 | Stop reason: HOSPADM

## 2017-10-19 RX ORDER — FLUTICASONE FUROATE AND VILANTEROL 100; 25 UG/1; UG/1
1 POWDER RESPIRATORY (INHALATION) DAILY
Status: DISCONTINUED | OUTPATIENT
Start: 2017-10-20 | End: 2017-10-20 | Stop reason: HOSPADM

## 2017-10-19 RX ORDER — GEMFIBROZIL 600 MG/1
600 TABLET, FILM COATED ORAL
Status: DISCONTINUED | OUTPATIENT
Start: 2017-10-19 | End: 2017-10-20 | Stop reason: HOSPADM

## 2017-10-19 RX ORDER — CLONAZEPAM 1 MG/1
1 TABLET ORAL
Status: DISCONTINUED | OUTPATIENT
Start: 2017-10-19 | End: 2017-10-20 | Stop reason: HOSPADM

## 2017-10-19 RX ORDER — SODIUM CHLORIDE 0.9 % (FLUSH) 0.9 %
5-10 SYRINGE (ML) INJECTION EVERY 8 HOURS
Status: DISCONTINUED | OUTPATIENT
Start: 2017-10-19 | End: 2017-10-20 | Stop reason: HOSPADM

## 2017-10-19 RX ORDER — ALBUTEROL SULFATE 0.83 MG/ML
2.5 SOLUTION RESPIRATORY (INHALATION)
Status: DISCONTINUED | OUTPATIENT
Start: 2017-10-19 | End: 2017-10-20 | Stop reason: HOSPADM

## 2017-10-19 RX ORDER — FERROUS SULFATE, DRIED 160(50) MG
500 TABLET, EXTENDED RELEASE ORAL DAILY
Status: DISCONTINUED | OUTPATIENT
Start: 2017-10-20 | End: 2017-10-20 | Stop reason: HOSPADM

## 2017-10-19 RX ORDER — KETOROLAC TROMETHAMINE 30 MG/ML
15 INJECTION, SOLUTION INTRAMUSCULAR; INTRAVENOUS
Status: COMPLETED | OUTPATIENT
Start: 2017-10-19 | End: 2017-10-19

## 2017-10-19 RX ORDER — DULOXETIN HYDROCHLORIDE 30 MG/1
30 CAPSULE, DELAYED RELEASE ORAL DAILY
Status: DISCONTINUED | OUTPATIENT
Start: 2017-10-20 | End: 2017-10-20 | Stop reason: HOSPADM

## 2017-10-19 RX ORDER — SODIUM CHLORIDE 0.9 % (FLUSH) 0.9 %
5-10 SYRINGE (ML) INJECTION AS NEEDED
Status: DISCONTINUED | OUTPATIENT
Start: 2017-10-19 | End: 2017-10-20 | Stop reason: HOSPADM

## 2017-10-19 RX ORDER — GUAIFENESIN 100 MG/5ML
81 LIQUID (ML) ORAL DAILY
Status: DISCONTINUED | OUTPATIENT
Start: 2017-10-20 | End: 2017-10-20 | Stop reason: HOSPADM

## 2017-10-19 RX ORDER — ENOXAPARIN SODIUM 100 MG/ML
1 INJECTION SUBCUTANEOUS
Status: COMPLETED | OUTPATIENT
Start: 2017-10-19 | End: 2017-10-19

## 2017-10-19 RX ADMIN — CEFTRIAXONE 1 G: 1 INJECTION, POWDER, FOR SOLUTION INTRAMUSCULAR; INTRAVENOUS at 15:44

## 2017-10-19 RX ADMIN — ENOXAPARIN SODIUM 90 MG: 40 INJECTION SUBCUTANEOUS at 17:15

## 2017-10-19 RX ADMIN — OXYCODONE HYDROCHLORIDE 5 MG: 5 TABLET ORAL at 21:25

## 2017-10-19 RX ADMIN — Medication 10 ML: at 21:23

## 2017-10-19 RX ADMIN — KETOROLAC TROMETHAMINE 15 MG: 30 INJECTION, SOLUTION INTRAMUSCULAR at 12:35

## 2017-10-19 RX ADMIN — GEMFIBROZIL 600 MG: 600 TABLET, FILM COATED ORAL at 23:52

## 2017-10-19 NOTE — H&P
Hospitalist Admission Note    NAME: Christiano Field   :  1945   MRN:  [id number]     Date/Time:  10/19/2017 3:13 PM    Patient PCP: Vinicius Platt MD  ________________________________________________________________________    My assessment of this patient's clinical condition and my plan of care is as follows. Assessment / Plan:  Possible acute PE  Risk factors: + recent trauma to R LE, obesity. - no Fx/personal h/o blood clots   Sx: pleuritic CP, elevated d-dimers  V/Q: indeterminate for pulmonary embolus.  + h/o allergic to iodine ( SOB )   Start therapeutic lovenox  Check BL LE US --> if + for DVT will treat with [facility]; if - for DVT consider CTA with premedication per protocol or pulmonary consult for advise. UTI   Empiric CTX pending UC     HTN  BP stable  Continue home atenolol    RYAN, cpap   Hyperlipidemia, cont meds   Depression, cont home meds   Asthma wo exacerbation, cont jet nebs prn, Singulair ad Advair   H/o bladder cancer, urology Dr Patel Breeding  Obesity, Body mass index is 34.23 kg/(m^2). Code Status:  Full code   Surrogate Decision Maker:      DVT Prophylaxis: Lovenox   GI Prophylaxis: not indicated    Baseline: , independent     Admission was done for Dr. Inga Corrigan as a tuck in service. He will assume care of this pt in am.         Subjective:   CHIEF COMPLAINT: chest pain     HISTORY OF PRESENT ILLNESS:     Shira Bates is a 67 y.o.  female who presents with above complaint. Pt started with chest pain since yesterday afternoon. Pain started acutely. She describe it as right shoulder pain radiating through to her back and into her right upper chest. Pain is exacerbated with bending over, deep inspiration, and laying flat. Pt reports GLF 1 week ago. She fell backwards onto her right shoulder. She hurt her right LE. Pt denies personal or family h/o DVT/PE.  No SOB/fever/chills.      Vs:  97.5 °F (36.4 °C) - 78 - 140/82 -16 - 99%RA     We were asked to admit for work up and evaluation of the above problems. Past Medical History:   Diagnosis Date    Allergic rhinitis, cause unspecified 1/17/2011    Asthma     Asthma     Bladder cancer ([facility] [address] 7/7/2010    Chronic airway obstruction, not elsewhere classified 1/17/2011    Depression 1/17/2011    Diverticulosis 7/7/2010    Encounter for long-term (current) use of other medications 1/17/2011    Essential hypertension, benign 11/30/2012    Hypercholesterolemia     Mixed hyperlipidemia 1/17/2011    Sleep apnea     CPAP        Past Surgical History:   Procedure Laterality Date    ENDOSCOPY, COLON, DIAGNOSTIC      HX CARPAL TUNNEL RELEASE      right    HX CHOLECYSTECTOMY      HX CYSTOCELE REPAIR      HX HYSTERECTOMY      HX ORTHOPAEDIC      left thumb surgery    HX UROLOGICAL      resection of bladder tumor; bladder sling    REPAIR OF RECTOCELE         Social History   Substance Use Topics    Smoking status: Never Smoker    Smokeless tobacco: Never Used    Alcohol use No      Comment: occ. wine        Family History   Problem Relation Age of Onset    Diabetes Mother     Heart Disease Mother     Elevated Lipids Mother     Cancer Mother      basal cell ca    Hypertension Father     Cancer Father      basal cell ca    Cancer Sister      breast    Breast Cancer Sister      46s     Allergies   Allergen Reactions    Ciprofloxacin Unknown (comments)    Hydrocodone Unknown (comments)    Iodine Unknown (comments)    Lithium Unknown (comments)    Pravastatin Myalgia    Simvastatin Unknown (comments)    Wellbutrin [Bupropion Hcl] Unknown (comments)        Prior to Admission medications    Medication Sig Start Date End Date Taking?  Authorizing Provider   montelukast (SINGULAIR) 10 mg tablet Take 1 tablet by mouth  daily 9/15/17  Yes Sabrina Giles MD   gemfibrozil (LOPID) 600 mg tablet Take 1 tablet by mouth  twice a day 9/13/17  Yes Sabrina Giles MD   atenolol (TENORMIN) 50 mg tablet Take 1 and 1/2 tablets by  mouth daily 7/5/17  Yes Surjit Dave MD   DULoxetine (CYMBALTA) 30 mg capsule Take 1 capsule by mouth  daily 7/5/17  Yes Surjit Dave MD   Calcium Carbonate-Vit D3-Min 600 mg calcium- 400 unit tab Take 1 Tab by mouth daily. Yes Historical Provider   fluticasone-salmeterol (ADVAIR DISKUS) 250-50 mcg/dose diskus inhaler Take 1 Puff by inhalation every twelve (12) hours. 3/30/17  Yes Surjit Dave MD   omega-3 fatty acids-vitamin e (FISH OIL) 1,000 mg Cap Take 1 Cap by mouth two (2) times a day. Yes Historical Provider   lysine (L-LYSINE) 500 mg Tab tablet Take  by mouth three (3) times daily (with meals). Yes Historical Provider   aspirin 81 mg chewable tablet Take 81 mg by mouth daily. Yes Historical Provider   fexofenadine (ALLEGRA) 180 mg tablet Take 180 mg by mouth daily. Yes Historical Provider   clonazePAM (KLONOPIN) 1 mg tablet Take 1 Tab by mouth daily as needed. 9/13/17   Surjit Dave MD   albuterol (PROVENTIL, VENTOLIN) 90 mcg/Actuation inhaler Take 2 Puffs by inhalation every six (6) hours as needed. Historical Provider       REVIEW OF SYSTEMS:     I am not able to complete the review of systems because:    The patient is intubated and sedated    The patient has altered mental status due to his acute medical problems    The patient has baseline aphasia from prior stroke(s)    The patient has baseline dementia and is not reliable historian    The patient is in acute medical distress and unable to provide information           Total of 12 systems reviewed as follows:       POSITIVE= underlined text  Negative = text not underlined  General:  fever, chills, sweats, generalized weakness, weight loss/gain,      loss of appetite   Eyes:    blurred vision, eye pain, loss of vision, double vision  ENT:    rhinorrhea, pharyngitis   Respiratory:   cough, sputum production, SOB, PARRA, wheezing, pleuritic pain   Cardiology:   chest pain, palpitations, orthopnea, PND, edema, syncope   Gastrointestinal:  abdominal pain , N/V, diarrhea, dysphagia, constipation, bleeding   Genitourinary:  frequency, urgency, dysuria, hematuria, incontinence   Muskuloskeletal :  arthralgia, myalgia, back pain  Hematology:  easy bruising, nose or gum bleeding, lymphadenopathy   Dermatological: rash, ulceration, pruritis, color change / jaundice  Endocrine:   hot flashes or polydipsia   Neurological:  headache, dizziness, confusion, focal weakness, paresthesia,     Speech difficulties, memory loss, gait difficulty  Psychological: Feelings of anxiety, depression, agitation    Objective:   VITALS:    Visit Vitals    /64    Pulse (!) 46    Temp 97.5 °F (36.4 °C)    Resp 10    SpO2 96%       PHYSICAL EXAM:    General:    Alert, cooperative, no distress, appears stated age. HEENT: Atraumatic, anicteric sclerae, pink conjunctivae     No oral ulcers, mucosa moist, throat clear, dentition fair  Neck:  Supple, symmetrical,  thyroid: non tender  Lungs:   Clear to auscultation bilaterally. No Wheezing or Rhonchi. No rales. Chest wall:  No tenderness  No Accessory muscle use. Heart:   Regular  rhythm,  No  murmur   No edema  Abdomen:   Soft, non-tender. Not distended. Bowel sounds normal  Extremities: No cyanosis. No clubbing,                            + R LE resolving bruise     Skin turgor normal, Capillary refill normal, Radial dial pulse 2+  Skin:     Not pale. Not Jaundiced  No rashes   Psych:  Good insight. Not depressed. Not anxious or agitated. Neurologic: EOMs intact. No facial asymmetry. No aphasia or slurred speech. Symmetrical strength, Sensation grossly intact.  Alert and oriented X 4.     _______________________________________________________________________  Care Plan discussed with:    Comments   Patient y    Family      RN y    Care Manager                    Consultant:  y ED provider _______________________________________________________________________  Expected  Disposition:   Home with Family y   HH/PT/OT/RN    SNF/LTC    SKYLAR    ________________________________________________________________________  TOTAL TIME:  72 Minutes    Critical Care Provided     Minutes non procedure based      Comments    y Reviewed previous records    y Discussion with patient and/or family and questions answered       ________________________________________________________________________  Signed: Noam Martinez MD    Procedures: see electronic medical records for all procedures/Xrays and details which were not copied into this note but were reviewed prior to creation of Plan. LAB DATA REVIEWED:    Recent Results (from the past 24 hour(s))   EKG, 12 LEAD, INITIAL    Collection Time: 10/19/17 10:43 AM   Result Value Ref Range    Ventricular Rate 55 BPM    Atrial Rate 55 BPM    P-R Interval 200 ms    QRS Duration 88 ms    Q-T Interval 408 ms    QTC Calculation (Bezet) 390 ms    Calculated P Axis 44 degrees    Calculated R Axis -9 degrees    Calculated T Axis 3 degrees    Diagnosis       Sinus bradycardia  Low voltage QRS  Cannot rule out Anterior infarct , age undetermined  When compared with ECG of 03-FEB-2000 09:47,  Previous ECG has undetermined rhythm, needs review     CBC WITH AUTOMATED DIFF    Collection Time: 10/19/17 11:27 AM   Result Value Ref Range    WBC 3.2 (L) 3.6 - 11.0 K/uL    RBC 4.62 3.80 - 5.20 M/uL    HGB 13.5 11.5 - 16.0 g/dL    HCT 40.5 35.0 - 47.0 %    MCV 87.7 80.0 - 99.0 FL    MCH 29.2 26.0 - 34.0 PG    MCHC 33.3 30.0 - 36.5 g/dL    RDW 13.5 11.5 - 14.5 %    PLATELET 96 (L) 809 - 400 K/uL    NEUTROPHILS 42 32 - 75 %    LYMPHOCYTES 48 12 - 49 %    MONOCYTES 10 5 - 13 %    EOSINOPHILS 0 0 - 7 %    BASOPHILS 0 0 - 1 %    ABS. NEUTROPHILS 1.3 (L) 1.8 - 8.0 K/UL    ABS. LYMPHOCYTES 1.6 0.8 - 3.5 K/UL    ABS. MONOCYTES 0.3 0.0 - 1.0 K/UL    ABS. EOSINOPHILS 0.0 0.0 - 0.4 K/UL    ABS. BASOPHILS 0.0 0.0 - 0.1 K/UL    DF SMEAR SCANNED      RBC COMMENTS NORMOCYTIC, NORMOCHROMIC     METABOLIC PANEL, COMPREHENSIVE    Collection Time: 10/19/17 11:27 AM   Result Value Ref Range    Sodium 137 136 - 145 mmol/L    Potassium 4.0 3.5 - 5.1 mmol/L    Chloride 102 97 - 108 mmol/L    CO2 30 21 - 32 mmol/L    Anion gap 5 5 - 15 mmol/L    Glucose 127 (H) 65 - 100 mg/dL    BUN 17 6 - 20 MG/DL    Creatinine 0.68 0.55 - 1.02 MG/DL    BUN/Creatinine ratio 25 (H) 12 - 20      GFR est AA >60 >60 ml/min/1.73m2    GFR est non-AA >60 >60 ml/min/1.73m2    Calcium 9.3 8.5 - 10.1 MG/DL    Bilirubin, total 0.5 0.2 - 1.0 MG/DL    ALT (SGPT) 33 12 - 78 U/L    AST (SGOT) 23 15 - 37 U/L    Alk.  phosphatase 63 45 - 117 U/L    Protein, total 7.9 6.4 - 8.2 g/dL    Albumin 4.1 3.5 - 5.0 g/dL    Globulin 3.8 2.0 - 4.0 g/dL    A-G Ratio 1.1 1.1 - 2.2     TROPONIN I    Collection Time: 10/19/17 11:27 AM   Result Value Ref Range    Troponin-I, Qt. <0.04 <0.05 ng/mL   CK W/ REFLX CKMB    Collection Time: 10/19/17 11:27 AM   Result Value Ref Range    CK 37 26 - 192 U/L   D DIMER    Collection Time: 10/19/17 11:47 AM   Result Value Ref Range    D-dimer 1.21 (H) 0.00 - 0.65 mg/L FEU   PROTHROMBIN TIME + INR    Collection Time: 10/19/17 11:47 AM   Result Value Ref Range    INR 1.0 0.9 - 1.1      Prothrombin time 10.4 9.0 - 11.1 sec   URINALYSIS W/ REFLEX CULTURE    Collection Time: 10/19/17  1:07 PM   Result Value Ref Range    Color YELLOW/STRAW      Appearance CLOUDY (A) CLEAR      Specific gravity 1.015 1.003 - 1.030      pH (UA) 6.5 5.0 - 8.0      Protein NEGATIVE  NEG mg/dL    Glucose NEGATIVE  NEG mg/dL    Ketone NEGATIVE  NEG mg/dL    Bilirubin NEGATIVE  NEG      Blood TRACE (A) NEG      Urobilinogen 0.2 0.2 - 1.0 EU/dL    Nitrites NEGATIVE  NEG      Leukocyte Esterase LARGE (A) NEG      WBC >100 (H) 0 - 4 /hpf    RBC 0-5 0 - 5 /hpf    Epithelial cells FEW FEW /lpf    Bacteria 4+ (A) NEG /hpf    UA:UC IF INDICATED URINE CULTURE ORDERED (A) CNI      Other: Renal Epithelial cells Present

## 2017-10-19 NOTE — ED PROVIDER NOTES
[facility]  EMERGENCY DEPARTMENT HISTORY AND PHYSICAL EXAM       Date of Service: 10/19/2017   Patient Name: Toni Calvillo   YOB: 1945  Medical Record Number: [id number]    History of Presenting Illness     Chief Complaint   Patient presents with    Chest Pain     R side chest pain radiating through to her back, worse when she takes a deep breath. History Provided By:  patient    Additional History:   Toni Calvillo is a 67 y.o. female with PMhx significant for depression, asthma, hypercholesterolemia, and bladder cancer who presents ambulatory to the ED with cc of an acute onset of right shoulder pain radiating through to her back and into her right upper chest as well x yesterday afternoon. She reports associated sx of an intermittent episode of substernal chest pain radiating to the right shoulder last night lasting less than 5 minutes before resolving. The pt expresses that her pain is exacerbated with bending over, deep inspiration, and laying flat and has found no relief leading her to the ED. She makes note that she experienced a GLF 1 week ago stating she fell backwards onto her right shoulder but she was not evaluated for her sx at the time. She called her PCP regarding her sx and was unable to obtain an appointment and was referred to the ED. The pt She denies any h/o DVT/PE,  fevers, chills, SOB, leg swelling, calf pain, cough,  abdominal pain, nausea, vomiting, diarrhea, dysuria, or hematuria. Social Hx: (-) Tobacco, (-) EtOH, (-) Illicit Drugs    There are no other complaints, changes or physical findings at this time.     Primary Care Provider: Marna Fabry, MD     Past History     Past Medical History:   Past Medical History:   Diagnosis Date    Allergic rhinitis, cause unspecified 1/17/2011    Asthma     Asthma     Bladder cancer ([facility] 7/7/2010    Chronic airway obstruction, not elsewhere classified 1/17/2011    Depression 1/17/2011    Diverticulosis 7/7/2010    Encounter for long-term (current) use of other medications 1/17/2011    Essential hypertension, benign 11/30/2012    Hypercholesterolemia     Mixed hyperlipidemia 1/17/2011    Sleep apnea     CPAP        Past Surgical History:   Past Surgical History:   Procedure Laterality Date    ENDOSCOPY, COLON, DIAGNOSTIC      HX CARPAL TUNNEL RELEASE      right    HX CHOLECYSTECTOMY      HX CYSTOCELE REPAIR      HX HYSTERECTOMY      HX ORTHOPAEDIC      left thumb surgery    HX UROLOGICAL      resection of bladder tumor; bladder sling    REPAIR OF RECTOCELE          Family History:   Family History   Problem Relation Age of Onset    Diabetes Mother     Heart Disease Mother     Elevated Lipids Mother     Cancer Mother      basal cell ca    Hypertension Father     Cancer Father      basal cell ca    Cancer Sister      breast    Breast Cancer Sister      46s        Social History:   Social History   Substance Use Topics    Smoking status: Never Smoker    Smokeless tobacco: Never Used    Alcohol use No      Comment: occ. wine        Allergies: Allergies   Allergen Reactions    Ciprofloxacin Unknown (comments)    Hydrocodone Unknown (comments)    Iodine Unknown (comments)    Lithium Unknown (comments)    Pravastatin Myalgia    Simvastatin Unknown (comments)    Wellbutrin [Bupropion Hcl] Unknown (comments)         Review of Systems   Review of Systems   Constitutional: Negative. Negative for appetite change, chills, fatigue and fever. HENT: Negative. Negative for congestion, rhinorrhea, sinus pressure and sore throat. Eyes: Negative. Respiratory: Negative. Negative for cough, choking, chest tightness, shortness of breath and wheezing. Cardiovascular: Positive for chest pain. Negative for palpitations and leg swelling (sternal radiating right ). Gastrointestinal: Negative for abdominal pain, constipation, diarrhea, nausea and vomiting. Endocrine: Negative. Genitourinary: Negative. Negative for difficulty urinating, dysuria, flank pain and urgency. Musculoskeletal: Positive for arthralgias (right shoulder). Negative for myalgias (calf pain ). Skin: Negative. Neurological: Negative. Negative for dizziness, speech difficulty, weakness, light-headedness, numbness and headaches. Psychiatric/Behavioral: Negative. All other systems reviewed and are negative. Physical Exam  Physical Exam   Constitutional: She is oriented to person, place, and time. She appears well-developed and well-nourished. No distress. HENT:   Head: Normocephalic and atraumatic. Mouth/Throat: Oropharynx is clear and moist. No oropharyngeal exudate. Eyes: Conjunctivae and EOM are normal. Pupils are equal, round, and reactive to light. Neck: Normal range of motion. Neck supple. No JVD present. No tracheal deviation present. Cardiovascular: Normal rate, regular rhythm, normal heart sounds and intact distal pulses. No murmur heard. Pulmonary/Chest: Effort normal and breath sounds normal. No stridor. No respiratory distress. She has no wheezes. She has no rales. She exhibits no tenderness. Abdominal: Soft. She exhibits no distension. There is no tenderness. There is no rebound and no guarding. Musculoskeletal: Normal range of motion. She exhibits no edema or tenderness. Neurological: She is alert and oriented to person, place, and time. No cranial nerve deficit. No gross motor or sensory deficits    Skin: Skin is warm and dry. She is not diaphoretic. Psychiatric: She has a normal mood and affect. Her behavior is normal.   Nursing note and vitals reviewed. Medical Decision Making   I am the first provider for this patient. I reviewed the vital signs, available nursing notes, past medical history, past surgical history, family history and social history. Old Medical Records: Old medical records.      Provider Notes:     DDx: PNA, PE, Pleurisy, Rib fracture, Rotator cuff strain. ED Course:  11:26 AM   Initial assessment performed. The patients presenting problems have been discussed, and they are in agreement with the care plan formulated and outlined with them. I have encouraged them to ask questions as they arise throughout their visit. Progress Notes:     PROGRESS NOTE:  1:09 PM  Pt has been re-evaluated. The pt was updated on the warrant for a VQ scan due to an elevated D-dimer. The pt verbalizes understanding  Written by Jack Logan. Seth, ED Scribe, as dictated by Seth Giraldo DO.     CONSULT NOTE:  2:44 PM  Seth Giraldo DO spoke with Dr. Candida May,  Specialty: PCP   Discussed pt's hx, disposition, and available diagnostic and imaging results. Reviewed care plans. Consultant recommends hospitalist admission. Written by Ashanti Ann, ED Scribe, as dictated by Seth Giraldo DO     CONSULT NOTE:   2:48 PM  Seth Giraldo DO spoke with Dr. General Allen,   Specialty: Hospitalist  Discussed pt's hx, disposition, and available diagnostic and imaging results. Reviewed care plans. Consultant will evaluate pt for admission. Written by Jessica Guadarrama, ED Scribe, as dictated by Seth Giraldo DO.             Diagnostic Study Results     Labs -      Recent Results (from the past 12 hour(s))   EKG, 12 LEAD, INITIAL    Collection Time: 10/19/17 10:43 AM   Result Value Ref Range    Ventricular Rate 55 BPM    Atrial Rate 55 BPM    P-R Interval 200 ms    QRS Duration 88 ms    Q-T Interval 408 ms    QTC Calculation (Bezet) 390 ms    Calculated P Axis 44 degrees    Calculated R Axis -9 degrees    Calculated T Axis 3 degrees    Diagnosis       Sinus bradycardia  Low voltage QRS  Cannot rule out Anterior infarct , age undetermined  When compared with ECG of 03-FEB-2000 09:47,  Previous ECG has undetermined rhythm, needs review     CBC WITH AUTOMATED DIFF    Collection Time: 10/19/17 11:27 AM   Result Value Ref Range    WBC 3.2 (L) 3.6 - 11.0 K/uL    RBC 4.62 3.80 - 5.20 M/uL    HGB 13.5 11.5 - 16.0 g/dL    HCT 40.5 35.0 - 47.0 %    MCV 87.7 80.0 - 99.0 FL    MCH 29.2 26.0 - 34.0 PG    MCHC 33.3 30.0 - 36.5 g/dL    RDW 13.5 11.5 - 14.5 %    PLATELET 96 (L) 975 - 400 K/uL    NEUTROPHILS 42 32 - 75 %    LYMPHOCYTES 48 12 - 49 %    MONOCYTES 10 5 - 13 %    EOSINOPHILS 0 0 - 7 %    BASOPHILS 0 0 - 1 %    ABS. NEUTROPHILS 1.3 (L) 1.8 - 8.0 K/UL    ABS. LYMPHOCYTES 1.6 0.8 - 3.5 K/UL    ABS. MONOCYTES 0.3 0.0 - 1.0 K/UL    ABS. EOSINOPHILS 0.0 0.0 - 0.4 K/UL    ABS. BASOPHILS 0.0 0.0 - 0.1 K/UL    DF SMEAR SCANNED      RBC COMMENTS NORMOCYTIC, NORMOCHROMIC     METABOLIC PANEL, COMPREHENSIVE    Collection Time: 10/19/17 11:27 AM   Result Value Ref Range    Sodium 137 136 - 145 mmol/L    Potassium 4.0 3.5 - 5.1 mmol/L    Chloride 102 97 - 108 mmol/L    CO2 30 21 - 32 mmol/L    Anion gap 5 5 - 15 mmol/L    Glucose 127 (H) 65 - 100 mg/dL    BUN 17 6 - 20 MG/DL    Creatinine 0.68 0.55 - 1.02 MG/DL    BUN/Creatinine ratio 25 (H) 12 - 20      GFR est AA >60 >60 ml/min/1.73m2    GFR est non-AA >60 >60 ml/min/1.73m2    Calcium 9.3 8.5 - 10.1 MG/DL    Bilirubin, total 0.5 0.2 - 1.0 MG/DL    ALT (SGPT) 33 12 - 78 U/L    AST (SGOT) 23 15 - 37 U/L    Alk.  phosphatase 63 45 - 117 U/L    Protein, total 7.9 6.4 - 8.2 g/dL    Albumin 4.1 3.5 - 5.0 g/dL    Globulin 3.8 2.0 - 4.0 g/dL    A-G Ratio 1.1 1.1 - 2.2     TROPONIN I    Collection Time: 10/19/17 11:27 AM   Result Value Ref Range    Troponin-I, Qt. <0.04 <0.05 ng/mL   CK W/ REFLX CKMB    Collection Time: 10/19/17 11:27 AM   Result Value Ref Range    CK 37 26 - 192 U/L   D DIMER    Collection Time: 10/19/17 11:47 AM   Result Value Ref Range    D-dimer 1.21 (H) 0.00 - 0.65 mg/L FEU   PROTHROMBIN TIME + INR    Collection Time: 10/19/17 11:47 AM   Result Value Ref Range    INR 1.0 0.9 - 1.1      Prothrombin time 10.4 9.0 - 11.1 sec   URINALYSIS W/ REFLEX CULTURE    Collection Time: 10/19/17  1:07 PM   Result Value Ref Range    Color YELLOW/STRAW      Appearance CLOUDY (A) CLEAR      Specific gravity 1.015 1.003 - 1.030      pH (UA) 6.5 5.0 - 8.0      Protein NEGATIVE  NEG mg/dL    Glucose NEGATIVE  NEG mg/dL    Ketone NEGATIVE  NEG mg/dL    Bilirubin NEGATIVE  NEG      Blood TRACE (A) NEG      Urobilinogen 0.2 0.2 - 1.0 EU/dL    Nitrites NEGATIVE  NEG      Leukocyte Esterase LARGE (A) NEG      WBC >100 (H) 0 - 4 /hpf    RBC 0-5 0 - 5 /hpf    Epithelial cells FEW FEW /lpf    Bacteria 4+ (A) NEG /hpf    UA:UC IF INDICATED URINE CULTURE ORDERED (A) CNI      Other: Renal Epithelial cells Present         Radiologic Studies -  The following have been ordered and reviewed:  NM LUNG VENT/PERF   Final Result   Indication: Dyspnea     A V/Q scan was performed with 10 mCi xenon 133 inhaled and 4 mCi Tc MAA injected  intravenously. Ventilation demonstrates no significant delay in washout. Ratna Riser Perfusion images demonstrates small segmental defect in the left apex. There is  mild inhomogeneity in the tracer distribution in the left lower lobe and in the  right lung base.     .  IMPRESSION  IMPRESSION:  1. Examination is indeterminate for pulmonary embolus. .   XR CHEST PA LAT   Final Result   Exam:  2 view chest     Indication: Chest pain right side radiating to back when taking a deep breath.     COMPARISON: 6/8/2011     PA and lateral views demonstrate normal heart size. The patient is on a cardiac  monitor. The lungs demonstrate bibasilar areas of scarring similar to the  previous examination. No acute process.     IMPRESSION  IMPRESSION:  1. No change bibasilar areas of scarring       Vital Signs-Reviewed the patient's vital signs.    Patient Vitals for the past 12 hrs:   Temp Pulse Resp BP SpO2   10/19/17 1400 - (!) 46 10 109/64 96 %   10/19/17 1245 - (!) 48 14 126/71 95 %   10/19/17 1230 - (!) 48 14 115/73 96 %   10/19/17 1215 - (!) 50 15 127/60 94 %   10/19/17 1200 - (!) 54 17 113/56 96 %   10/19/17 1145 - (!) 54 15 110/58 97 %   10/19/17 1130 - (!) 52 15 124/60 96 %   10/19/17 1115 - (!) 52 15 122/57 96 %   10/19/17 1043 97.5 °F (36.4 °C) 78 16 140/82 99 %       Medications Given in the ED:  Medications   ketorolac (TORADOL) injection 15 mg (15 mg IntraVENous Given 10/19/17 1235)   xenon xe 379 gas 10 millicurie (10 millicuries Inhalation Given 10/19/17 1400)   technetium albumin aggregated (MAA) solution 4 millicurie (4 millicuries IntraVENous Given 10/19/17 1400)       EKG interpretation: (Preliminary)  Sinus mayra, rate 55, normal axis,pr/qrs, no acute ST-T wave changes Mari Cruz DO    VQ scan shows intermediate probability, pt has iodine allergy, unable to get CT PE study without, CT protocol for iodiine allergy, will dose pt with Lovenox here in the ED, before committing pt to Anti-coagulation, discuss with her PCP, will admit start on allergy protocol for further assessment. Juan Moralez DO    Diagnosis   Clinical Impression:   1. Other acute pulmonary embolism without acute cor pulmonale ([address]    2. Acute cystitis without hematuria    3. Gastroesophageal reflux disease without esophagitis    4. Essential hypertension, benign    5. Other chest pain    6. Other pulmonary embolism without acute cor pulmonale, unspecified chronicity ([address]         Plan:  1: Admission    Disposition Note:  Admit Note:  2:48 PM  Patient is being admitted to the hospital by Dr. Shabbir Chua. The results of their tests and reasons for their admission have been discussed with the patient and/or available family. They convey their agreement and understanding for the need to be admitted and for their admission diagnosis. Written by Francesca Ann ED Scribe, as dictated by Mari Cruz DO.   _______________________________   Attestations:     Attestations: This note is prepared by Sade Go, acting as Scribe for Mari Cruz DO. The scribe's documentation has been prepared under my direction and personally reviewed by me in its entirety.  I confirm that the note above accurately reflects all work, treatment, procedures, and medical decision making performed by me.   Caroline Llamas, DO  _______________________________

## 2017-10-19 NOTE — IP AVS SNAPSHOT
[address] [facility] 
[phone] Patient: Remy Vargas MRN: [id number] FQA:[id number] Current Discharge Medication List  
  
START taking these medications Dose & Instructions Dispensing Information Comments Morning Noon Evening Bedtime  
 nitrofurantoin (macrocrystal-monohydrate) 100 mg capsule Commonly known as:  MACROBID Your last dose was: Your next dose is:    
   
   
 Dose:  100 mg Take 1 Cap by mouth two (2) times a day. Quantity:  10 Cap Refills:  0  
     
   
   
   
  
 traMADol 50 mg tablet Commonly known as:  ULTRAM  
   
Your last dose was: Your next dose is:    
   
   
 Dose:  50 mg Take 1 Tab by mouth every six (6) hours as needed for Pain. Max Daily Amount: 200 mg. Quantity:  30 Tab Refills:  0 CONTINUE these medications which have NOT CHANGED Dose & Instructions Dispensing Information Comments Morning Noon Evening Bedtime  
 albuterol 90 mcg/actuation inhaler Commonly known as:  Judie Bock Your last dose was: Your next dose is:    
   
   
 Dose:  2 Puff Take 2 Puffs by inhalation every six (6) hours as needed. Refills:  0  
     
   
   
   
  
 aspirin 81 mg chewable tablet Your last dose was: Your next dose is:    
   
   
 Dose:  81 mg Take 81 mg by mouth daily. Refills:  0  
     
   
   
   
  
 atenolol 50 mg tablet Commonly known as:  TENORMIN Your last dose was: Your next dose is: Take 1 and 1/2 tablets by  mouth daily Quantity:  135 Tab Refills:  1 Calcium Carbonate-Vit D3-Min 600 mg calcium- 400 unit Tab Your last dose was: Your next dose is:    
   
   
 Dose:  1 Tab Take 1 Tab by mouth daily. Refills:  0  
     
   
   
   
  
 clonazePAM 1 mg tablet Commonly known as:  Robson Morales  
   
 Your last dose was: Your next dose is:    
   
   
 Dose:  1 mg Take 1 Tab by mouth daily as needed. Quantity:  90 Tab Refills:  1 DULoxetine 30 mg capsule Commonly known as:  CYMBALTA Your last dose was: Your next dose is: Take 1 capsule by mouth  daily Quantity:  90 Cap Refills:  1  
     
   
   
   
  
 fexofenadine 180 mg tablet Commonly known as:  Carletha Nipper Your last dose was: Your next dose is:    
   
   
 Dose:  180 mg Take 180 mg by mouth daily. Refills:  0  
     
   
   
   
  
 FISH OIL 1,000 mg Cap Generic drug:  omega-3 fatty acids-vitamin e Your last dose was: Your next dose is:    
   
   
 Dose:  1 Cap Take 1 Cap by mouth two (2) times a day. Refills:  0  
     
   
   
   
  
 fluticasone-salmeterol 250-50 mcg/dose diskus inhaler Commonly known as:  ADVAIR DISKUS Your last dose was: Your next dose is:    
   
   
 Dose:  1 Puff Take 1 Puff by inhalation every twelve (12) hours. Quantity:  3 Inhaler Refills:  3  
     
   
   
   
  
 gemfibrozil 600 mg tablet Commonly known as:  LOPID Your last dose was: Your next dose is: Take 1 tablet by mouth  twice a day Quantity:  180 Tab Refills:  1  
     
   
   
   
  
 lysine 500 mg Tab tablet Commonly known as:  L-LYSINE Your last dose was: Your next dose is: Take  by mouth three (3) times daily (with meals). Refills:  0  
     
   
   
   
  
 montelukast 10 mg tablet Commonly known as:  SINGULAIR Your last dose was: Your next dose is: Take 1 tablet by mouth  daily Quantity:  90 Tab Refills:  3 Where to Get Your Medications Information on where to get these meds will be given to you by the nurse or doctor. ! Ask your nurse or doctor about these medications nitrofurantoin (macrocrystal-monohydrate) 100 mg capsule  
 traMADol 50 mg tablet

## 2017-10-19 NOTE — IP AVS SNAPSHOT
Summary of Care Report The Summary of Care report has been created to help improve care coordination. Users with access to [employer] or [facility] (Web-based application) may access additional patient information including the Discharge Summary. If you are not currently a [address] user and need more information, please call the number listed below in the [place] section and ask to be connected with Medical Records. Facility Information Name Address Phone [address] [address] [phone] Patient Information Patient Name Sex ADRIANA Velasco ([id number]) Female 1945 Discharge Information Admitting Provider Service Area Unit Jennifer Isbell MD / [phone] [address] [facility] Orthopedics  [phone] Discharge Provider Discharge Date/Time Discharge Disposition Destination (none) 10/20/2017 Afternoon (Pending) AHR (none) Patient Language Language ENGLISH [13] Hospital Problems as of 10/20/2017  Reviewed: 10/19/2017  3:46 PM by Jennifer Isbell MD  
  
  
  
 Class Noted - Resolved Last Modified POA Active Problems Mixed hyperlipidemia  2011 - Present 10/20/2017 by Nancy Berry MD Yes Entered by Ryan Loja MD  
  Essential hypertension, benign  2012 - Present 10/20/2017 by Nanyc Berry MD Yes Entered by Nancy Berry MD  
  Esophageal reflux  3/29/2013 - Present 10/20/2017 by Nancy Berry MD Yes Entered by Nancy Berry MD  
  * (Principal)Other chest pain  10/20/2017 - Present 10/20/2017 by Nancy Berry MD Unknown Entered by Nancy Berry MD  
  Simple chronic bronchitis ([facility]  10/20/2017 - Present 10/20/2017 by Nancy Berry MD Unknown Entered by Mikala Campbell MD  
  Pulmonary nodule, right  10/20/2017 - Present 10/20/2017 by Mikala Campbell MD Unknown Entered by Mikala Campbell MD  
  Cystitis  10/20/2017 - Present 10/20/2017 by Mikala Campbell MD Unknown Entered by Mikala Campbell MD  
  
Non-Hospital Problems as of 10/20/2017  Reviewed: 10/19/2017  3:46 PM by Ulisses Hidalgo MD  
  
  
  
 Class Noted - Resolved Last Modified Active Problems Bladder cancer ([facility]  7/7/2010 - Present 7/7/2010 by Jessica Manzo MD  
  Entered by Jessica Manzo MD  
  Diverticulosis  7/7/2010 - Present 7/7/2010 by Jessica Manzo MD  
  Entered by eJssica Manzo MD  
  Chronic airway obstruction, not elsewhere classified  1/17/2011 - Present 1/17/2011 by Jessica Manzo MD  
  Entered by Jessica Manzo MD  
  Allergic rhinitis  1/17/2011 - Present 12/21/2015 by Mikala Campbell MD  
  Entered by Jessica Manzo MD  
  Depression  1/17/2011 - Present 1/17/2011 by Jessica Manzo MD  
  Entered by Jessica Manzo MD  
  Encounter for long-term (current) use of other medications  1/17/2011 - Present 1/17/2011 by Jessica Manzo MD  
  Entered by Jessica Manzo MD  
  Colon polyps  6/2/2014 - Present 6/2/2014 by Mikala Campbell MD  
  Entered by Mikala Campbell MD  
  
You are allergic to the following Allergen Reactions Ciprofloxacin Unknown (comments) Hydrocodone Unknown (comments) Iodine Shortness of Breath Lithium Unknown (comments) Pravastatin Myalgia Simvastatin Unknown (comments) Wellbutrin (Bupropion Hcl) Unknown (comments) Current Discharge Medication List  
  
START taking these medications Dose & Instructions Dispensing Information Comments  
 nitrofurantoin (macrocrystal-monohydrate) 100 mg capsule Commonly known as:  MACROBID Dose:  100 mg Take 1 Cap by mouth two (2) times a day. Quantity:  10 Cap Refills:  0  
   
 traMADol 50 mg tablet Commonly known as:  ULTRAM  
 Dose:  50 mg Take 1 Tab by mouth every six (6) hours as needed for Pain. Max Daily Amount: 200 mg. Quantity:  30 Tab Refills:  0 CONTINUE these medications which have NOT CHANGED Dose & Instructions Dispensing Information Comments  
 albuterol 90 mcg/actuation inhaler Commonly known as:  Lukarloenia Daysis Dose:  2 Puff Take 2 Puffs by inhalation every six (6) hours as needed. Refills:  0  
   
 aspirin 81 mg chewable tablet Dose:  81 mg Take 81 mg by mouth daily. Refills:  0  
   
 atenolol 50 mg tablet Commonly known as:  TENORMIN Take 1 and 1/2 tablets by  mouth daily Quantity:  135 Tab Refills:  1 Calcium Carbonate-Vit D3-Min 600 mg calcium- 400 unit Tab Dose:  1 Tab Take 1 Tab by mouth daily. Refills:  0  
   
 clonazePAM 1 mg tablet Commonly known as:  Earnestine Blaze Dose:  1 mg Take 1 Tab by mouth daily as needed. Quantity:  90 Tab Refills:  1 DULoxetine 30 mg capsule Commonly known as:  CYMBALTA Take 1 capsule by mouth  daily Quantity:  90 Cap Refills:  1  
   
 fexofenadine 180 mg tablet Commonly known as:  Salma Cyphers Dose:  180 mg Take 180 mg by mouth daily. Refills:  0  
   
 FISH OIL 1,000 mg Cap Generic drug:  omega-3 fatty acids-vitamin e Dose:  1 Cap Take 1 Cap by mouth two (2) times a day. Refills:  0  
   
 fluticasone-salmeterol 250-50 mcg/dose diskus inhaler Commonly known as:  ADVAIR DISKUS Dose:  1 Puff Take 1 Puff by inhalation every twelve (12) hours. Quantity:  3 Inhaler Refills:  3  
   
 gemfibrozil 600 mg tablet Commonly known as:  LOPID Take 1 tablet by mouth  twice a day Quantity:  180 Tab Refills:  1  
   
 lysine 500 mg Tab tablet Commonly known as:  L-LYSINE Take  by mouth three (3) times daily (with meals). Refills:  0  
   
 montelukast 10 mg tablet Commonly known as:  SINGULAIR Take 1 tablet by mouth  daily Quantity:  90 Tab Refills:  3 Current Immunizations Name Date Influenza High Dose Vaccine PF 2016, 2015 Influenza Vaccine 2012 Influenza Vaccine PF 11/15/2013 Pneumococcal Polysaccharide (PPSV-23) 2017 Tdap 2013 Follow-up Information Follow up With Details Comments Contact Info John Arroyo MD On 10/26/2017 11;15am hospital follow-up [address] [facility] [address] [address] [id number] 
[phone] Juan Lowery MD  please schedule a followup appointment in 6 months. [address] [facility] [address] [place] [address] 
[phone] Discharge Instructions PATIENT DISCHARGE INSTRUCTIONS PATIENT DISCHARGE INSTRUCTIONS Esdras Benitez / [id number] : 1945 Admitted 10/19/2017 Discharged: 10/20/2017 · It is important that you take the medication exactly as they are prescribed. · Keep your medication in the bottles provided by the pharmacist and keep a list of the medication names, dosages, and times to be taken in your wallet. · Do not take other medications without consulting your doctor. What to do at [facility] Recommended Diet: Regular Diet Recommended Activity: Activity as tolerated If you experience any of the following symptoms worsening chest/back pain, please follow up with John Arroyo MD 
. Signed By: John Arroyo MD   
 2017 Chart Review Routing History Recipient Method Report Sent By Edmond Arroyo MD  
Phone: [phone] In Basket IP Auto Routed Notes Kostas Holland MD [[id number]] 10/19/2017  4:02 PM 10/19/2017

## 2017-10-19 NOTE — ED NOTES
TRANSFER - OUT REPORT:    Verbal report given to MARTÍN Grissom(name) on David Luciano  being transferred to Ortho(unit) for routine progression of care       Report consisted of patients Situation, Background, Assessment and   Recommendations(SBAR). Information from the following report(s) SBAR, ED Summary, Procedure Summary, MAR, Recent Results and Cardiac Rhythm Sinus [place] was reviewed with the receiving nurse. Lines:   Peripheral IV 10/19/17 Left Antecubital (Active)   Site Assessment Clean, dry, & intact 10/19/2017 11:25 AM   Phlebitis Assessment 0 10/19/2017 11:25 AM   Infiltration Assessment 0 10/19/2017 11:25 AM   Dressing Status Clean, dry, & intact 10/19/2017 11:25 AM   Hub Color/Line Status Pink 10/19/2017 11:25 AM        Opportunity for questions and clarification was provided. Patient transported with:   [facility].

## 2017-10-19 NOTE — ED NOTES
Assumed care of pt. Pt resting in stretcher. Call bell within reach. No other complaints voiced at this time.

## 2017-10-19 NOTE — ED NOTES
Patient resting in bed no needs or concerns voiced at this time.  at bedside. Will continue to monitor. Call light in reach.

## 2017-10-19 NOTE — IP AVS SNAPSHOT
[address] [place] [facility]. 
[phone] Patient: Kelly Harrison MRN: [id number] BGF:[id number] You are allergic to the following Allergen Reactions Ciprofloxacin Unknown (comments) Hydrocodone Unknown (comments) Iodine Shortness of Breath Lithium Unknown (comments) Pravastatin Myalgia Simvastatin Unknown (comments) Wellbutrin (Bupropion Hcl) Unknown (comments) Recent Documentation Height Weight Breastfeeding? BMI OB Status Smoking Status 1.651 m 93.3 kg No 34.23 kg/m2 Hysterectomy Never Smoker Unresulted Labs Order Current Status CULTURE, URINE Preliminary result Emergency Contacts Name Discharge Info Relation Home Work Mobile Raina Varela DISCHARGE CAREGIVER [3] Daughter [21] [phone] About your hospitalization You were admitted on:  October 19, 2017 You last received care in the:  [facility] 3 ORTHOPEDICS You were discharged on:  October 20, 2017 Unit phone number:  [phone] Why you were hospitalized Your primary diagnosis was: Other Chest Pain Your diagnoses also included:  Pulmonary Emboli (Hcc), Essential Hypertension, Benign, Mixed Hyperlipidemia, Esophageal Reflux, Simple Chronic Bronchitis (Hcc), Pulmonary Nodule, Right, Cystitis Providers Seen During Your Hospitalizations Provider Role Specialty Primary office phone Marty Lawson DO Attending Provider Emergency Medicine [phone] Flip Spain MD Attending Provider [facility] [phone] Your Primary Care Physician (PCP) Primary Care Physician Office Phone Office Fax Hyun Neelam [phone] Follow-up Information Follow up With Details Comments Contact Info Flip Spain MD On 10/26/2017 11;15am hospital follow-up [address] [facility] [address] [phone] 
[phone] Vikram Marie MD  please schedule a followup appointment in 6 months. [phone] [facility] [address] [facility] [address] [facility] 
[phone] Current Discharge Medication List  
  
START taking these medications Dose & Instructions Dispensing Information Comments Morning Noon Evening Bedtime  
 nitrofurantoin (macrocrystal-monohydrate) 100 mg capsule Commonly known as:  MACROBID Your last dose was: Your next dose is:    
   
   
 Dose:  100 mg Take 1 Cap by mouth two (2) times a day. Quantity:  10 Cap Refills:  0  
     
   
   
   
  
 traMADol 50 mg tablet Commonly known as:  ULTRAM  
   
Your last dose was: Your next dose is:    
   
   
 Dose:  50 mg Take 1 Tab by mouth every six (6) hours as needed for Pain. Max Daily Amount: 200 mg. Quantity:  30 Tab Refills:  0 CONTINUE these medications which have NOT CHANGED Dose & Instructions Dispensing Information Comments Morning Noon Evening Bedtime  
 albuterol 90 mcg/actuation inhaler Commonly known as:  Kathrin Schirmer Your last dose was: Your next dose is:    
   
   
 Dose:  2 Puff Take 2 Puffs by inhalation every six (6) hours as needed. Refills:  0  
     
   
   
   
  
 aspirin 81 mg chewable tablet Your last dose was: Your next dose is:    
   
   
 Dose:  81 mg Take 81 mg by mouth daily. Refills:  0  
     
   
   
   
  
 atenolol 50 mg tablet Commonly known as:  TENORMIN Your last dose was: Your next dose is: Take 1 and 1/2 tablets by  mouth daily Quantity:  135 Tab Refills:  1 Calcium Carbonate-Vit D3-Min 600 mg calcium- 400 unit Tab Your last dose was: Your next dose is:    
   
   
 Dose:  1 Tab Take 1 Tab by mouth daily. Refills:  0 clonazePAM 1 mg tablet Commonly known as:  Mike Harmon Your last dose was: Your next dose is:    
   
   
 Dose:  1 mg Take 1 Tab by mouth daily as needed. Quantity:  90 Tab Refills:  1 DULoxetine 30 mg capsule Commonly known as:  CYMBALTA Your last dose was: Your next dose is: Take 1 capsule by mouth  daily Quantity:  90 Cap Refills:  1  
     
   
   
   
  
 fexofenadine 180 mg tablet Commonly known as:  Carletha Nipper Your last dose was: Your next dose is:    
   
   
 Dose:  180 mg Take 180 mg by mouth daily. Refills:  0  
     
   
   
   
  
 FISH OIL 1,000 mg Cap Generic drug:  omega-3 fatty acids-vitamin e Your last dose was: Your next dose is:    
   
   
 Dose:  1 Cap Take 1 Cap by mouth two (2) times a day. Refills:  0  
     
   
   
   
  
 fluticasone-salmeterol 250-50 mcg/dose diskus inhaler Commonly known as:  ADVAIR DISKUS Your last dose was: Your next dose is:    
   
   
 Dose:  1 Puff Take 1 Puff by inhalation every twelve (12) hours. Quantity:  3 Inhaler Refills:  3  
     
   
   
   
  
 gemfibrozil 600 mg tablet Commonly known as:  LOPID Your last dose was: Your next dose is: Take 1 tablet by mouth  twice a day Quantity:  180 Tab Refills:  1  
     
   
   
   
  
 lysine 500 mg Tab tablet Commonly known as:  L-LYSINE Your last dose was: Your next dose is: Take  by mouth three (3) times daily (with meals). Refills:  0  
     
   
   
   
  
 montelukast 10 mg tablet Commonly known as:  SINGULAIR Your last dose was: Your next dose is: Take 1 tablet by mouth  daily Quantity:  90 Tab Refills:  3 Where to Get Your Medications Information on where to get these meds will be given to you by the nurse or doctor. ! Ask your nurse or doctor about these medications  
  nitrofurantoin (macrocrystal-monohydrate) 100 mg capsule  
 traMADol 50 mg tablet Discharge Instructions PATIENT DISCHARGE INSTRUCTIONS PATIENT DISCHARGE INSTRUCTIONS Maris Mena / [id number] : 1945 Admitted 10/19/2017 Discharged: 10/20/2017 · It is important that you take the medication exactly as they are prescribed. · Keep your medication in the bottles provided by the pharmacist and keep a list of the medication names, dosages, and times to be taken in your wallet. · Do not take other medications without consulting your doctor. What to do at [facility] Recommended Diet: Regular Diet Recommended Activity: Activity as tolerated If you experience any of the following symptoms worsening chest/back pain, please follow up with Tahir Rock MD 
. Signed By: Tahir Rock MD   
 2017 Discharge Orders None [employer] Announcement We are excited to announce that we are making your provider's discharge notes available to you in [employer]. You will see these notes when they are completed and signed by the physician that discharged you from your recent hospital stay. If you have any questions or concerns about any information you see in [employer], please call the Health Information Department where you were seen or reach out to your Primary Care Provider for more information about your plan of care. Introducing [facility]! Rin Dahl introduces [employer] patient portal. Now you can access parts of your medical record, email your doctor's office, and request medication refills online. 1. In your internet browser, go to [email or web address] 2. Click on the First Time User? Click Here link in the Sign In box. You will see the New Member Sign Up page. 3. Enter your [employer] Access Code exactly as it appears below.  You will not need to use this code after youve completed the sign-up process. If you do not sign up before the expiration date, you must request a new code. · [employer] Access Code: [email or web address] Expires: 1/17/2018 11:15 AM 
 
4. Enter the last four digits of your Social Security Number (xxxx) and Date of Birth (mm/dd/yyyy) as indicated and click Submit. You will be taken to the next sign-up page. 5. Create a [employer] ID. This will be your [employer] login ID and cannot be changed, so think of one that is secure and easy to remember. 6. Create a [employer] password. You can change your password at any time. 7. Enter your Password Reset Question and Answer. This can be used at a later time if you forget your password. 8. Enter your e-mail address. You will receive e-mail notification when new information is available in [address] 9. Click Sign Up. You can now view and download portions of your medical record. 10. Click the Download Summary menu link to download a portable copy of your medical information. If you have questions, please visit the Frequently Asked Questions section of the [employer] website. Remember, [employer] is NOT to be used for urgent needs. For medical emergencies, dial 911. Now available from your iPhone and Android! General Information Please provide this summary of care documentation to your next provider. Patient Signature:  ____________________________________________________________ Date:  ____________________________________________________________  
  
Ping Chavira Provider Signature:  ____________________________________________________________ Date:  ____________________________________________________________

## 2017-10-20 ENCOUNTER — APPOINTMENT (OUTPATIENT)
Dept: CT IMAGING | Age: 72
DRG: 313 | End: 2017-10-20
Attending: INTERNAL MEDICINE
Payer: MEDICARE

## 2017-10-20 VITALS
SYSTOLIC BLOOD PRESSURE: 114 MMHG | WEIGHT: 205.69 LBS | TEMPERATURE: 98 F | RESPIRATION RATE: 18 BRPM | HEIGHT: 65 IN | OXYGEN SATURATION: 95 % | DIASTOLIC BLOOD PRESSURE: 49 MMHG | BODY MASS INDEX: 34.27 KG/M2 | HEART RATE: 74 BPM

## 2017-10-20 PROBLEM — J41.0 SIMPLE CHRONIC BRONCHITIS (HCC): Status: ACTIVE | Noted: 2017-10-20

## 2017-10-20 PROBLEM — R91.1 PULMONARY NODULE, RIGHT: Status: ACTIVE | Noted: 2017-10-20

## 2017-10-20 PROBLEM — I26.99 PULMONARY EMBOLI (HCC): Status: RESOLVED | Noted: 2017-10-19 | Resolved: 2017-10-20

## 2017-10-20 PROBLEM — R07.89 OTHER CHEST PAIN: Status: ACTIVE | Noted: 2017-10-20

## 2017-10-20 PROBLEM — N30.90 CYSTITIS: Status: ACTIVE | Noted: 2017-10-20

## 2017-10-20 LAB
ANION GAP SERPL CALC-SCNC: 8 MMOL/L (ref 5–15)
BUN SERPL-MCNC: 21 MG/DL (ref 6–20)
BUN/CREAT SERPL: 30 (ref 12–20)
CALCIUM SERPL-MCNC: 9.2 MG/DL (ref 8.5–10.1)
CHLORIDE SERPL-SCNC: 103 MMOL/L (ref 97–108)
CO2 SERPL-SCNC: 27 MMOL/L (ref 21–32)
CREAT SERPL-MCNC: 0.7 MG/DL (ref 0.55–1.02)
ERYTHROCYTE [DISTWIDTH] IN BLOOD BY AUTOMATED COUNT: 13.4 % (ref 11.5–14.5)
GLUCOSE SERPL-MCNC: 121 MG/DL (ref 65–100)
HCT VFR BLD AUTO: 37.9 % (ref 35–47)
HGB BLD-MCNC: 12.9 G/DL (ref 11.5–16)
MCH RBC QN AUTO: 30.2 PG (ref 26–34)
MCHC RBC AUTO-ENTMCNC: 34 G/DL (ref 30–36.5)
MCV RBC AUTO: 88.8 FL (ref 80–99)
PLATELET # BLD AUTO: 112 K/UL (ref 150–400)
POTASSIUM SERPL-SCNC: 3.9 MMOL/L (ref 3.5–5.1)
RBC # BLD AUTO: 4.27 M/UL (ref 3.8–5.2)
SODIUM SERPL-SCNC: 138 MMOL/L (ref 136–145)
WBC # BLD AUTO: 4.9 K/UL (ref 3.6–11)

## 2017-10-20 PROCEDURE — 74011636320 HC RX REV CODE- 636/320: Performed by: FAMILY MEDICINE

## 2017-10-20 PROCEDURE — 74011250637 HC RX REV CODE- 250/637: Performed by: HOSPITALIST

## 2017-10-20 PROCEDURE — 80048 BASIC METABOLIC PNL TOTAL CA: CPT | Performed by: EMERGENCY MEDICINE

## 2017-10-20 PROCEDURE — 85027 COMPLETE CBC AUTOMATED: CPT | Performed by: EMERGENCY MEDICINE

## 2017-10-20 PROCEDURE — 36415 COLL VENOUS BLD VENIPUNCTURE: CPT | Performed by: EMERGENCY MEDICINE

## 2017-10-20 PROCEDURE — 71275 CT ANGIOGRAPHY CHEST: CPT

## 2017-10-20 PROCEDURE — 74011000258 HC RX REV CODE- 258: Performed by: HOSPITALIST

## 2017-10-20 PROCEDURE — 74011250636 HC RX REV CODE- 250/636: Performed by: FAMILY MEDICINE

## 2017-10-20 PROCEDURE — 74011250636 HC RX REV CODE- 250/636: Performed by: INTERNAL MEDICINE

## 2017-10-20 PROCEDURE — 74011250636 HC RX REV CODE- 250/636: Performed by: HOSPITALIST

## 2017-10-20 RX ORDER — NITROFURANTOIN 25; 75 MG/1; MG/1
100 CAPSULE ORAL 2 TIMES DAILY
Qty: 10 CAP | Refills: 0 | Status: SHIPPED
Start: 2017-10-20 | End: 2019-04-16 | Stop reason: ALTCHOICE

## 2017-10-20 RX ORDER — SODIUM CHLORIDE 9 MG/ML
50 INJECTION, SOLUTION INTRAVENOUS
Status: COMPLETED | OUTPATIENT
Start: 2017-10-20 | End: 2017-10-20

## 2017-10-20 RX ORDER — TRAMADOL HYDROCHLORIDE 50 MG/1
50 TABLET ORAL
Qty: 30 TAB | Refills: 0 | Status: SHIPPED
Start: 2017-10-20 | End: 2020-06-24 | Stop reason: ALTCHOICE

## 2017-10-20 RX ORDER — HYDROCORTISONE SODIUM SUCCINATE 100 MG/2ML
125 INJECTION, POWDER, FOR SOLUTION INTRAMUSCULAR; INTRAVENOUS EVERY 4 HOURS
Status: DISCONTINUED | OUTPATIENT
Start: 2017-10-20 | End: 2017-10-20

## 2017-10-20 RX ORDER — HYDROCORTISONE SODIUM SUCCINATE 100 MG/2ML
125 INJECTION, POWDER, FOR SOLUTION INTRAMUSCULAR; INTRAVENOUS EVERY 4 HOURS
Status: DISCONTINUED | OUTPATIENT
Start: 2017-10-20 | End: 2017-10-20 | Stop reason: HOSPADM

## 2017-10-20 RX ORDER — DIPHENHYDRAMINE HYDROCHLORIDE 50 MG/ML
50 INJECTION, SOLUTION INTRAMUSCULAR; INTRAVENOUS
Status: DISCONTINUED | OUTPATIENT
Start: 2017-10-20 | End: 2017-10-20

## 2017-10-20 RX ORDER — DIPHENHYDRAMINE HYDROCHLORIDE 50 MG/ML
50 INJECTION, SOLUTION INTRAMUSCULAR; INTRAVENOUS
Status: COMPLETED | OUTPATIENT
Start: 2017-10-20 | End: 2017-10-20

## 2017-10-20 RX ORDER — SODIUM CHLORIDE 0.9 % (FLUSH) 0.9 %
10 SYRINGE (ML) INJECTION
Status: COMPLETED | OUTPATIENT
Start: 2017-10-20 | End: 2017-10-20

## 2017-10-20 RX ADMIN — OXYCODONE HYDROCHLORIDE 5 MG: 5 TABLET ORAL at 04:10

## 2017-10-20 RX ADMIN — Medication 10 ML: at 14:16

## 2017-10-20 RX ADMIN — MONTELUKAST SODIUM 10 MG: 10 TABLET, FILM COATED ORAL at 10:08

## 2017-10-20 RX ADMIN — Medication 10 ML: at 06:26

## 2017-10-20 RX ADMIN — SODIUM CHLORIDE 50 ML/HR: 900 INJECTION, SOLUTION INTRAVENOUS at 15:11

## 2017-10-20 RX ADMIN — FLUTICASONE FUROATE AND VILANTEROL TRIFENATATE 1 PUFF: 100; 25 POWDER RESPIRATORY (INHALATION) at 10:08

## 2017-10-20 RX ADMIN — OXYCODONE HYDROCHLORIDE 5 MG: 5 TABLET ORAL at 17:07

## 2017-10-20 RX ADMIN — DIPHENHYDRAMINE HYDROCHLORIDE 50 MG: 50 INJECTION, SOLUTION INTRAMUSCULAR; INTRAVENOUS at 14:12

## 2017-10-20 RX ADMIN — Medication 10 ML: at 15:11

## 2017-10-20 RX ADMIN — OXYCODONE HYDROCHLORIDE 5 MG: 5 TABLET ORAL at 10:09

## 2017-10-20 RX ADMIN — DULOXETINE 30 MG: 30 CAPSULE, DELAYED RELEASE ORAL at 10:08

## 2017-10-20 RX ADMIN — OMEGA-3 FATTY ACIDS CAP DELAYED RELEASE 1000 MG 1 CAPSULE: 1000 CAPSULE DELAYED RELEASE at 10:08

## 2017-10-20 RX ADMIN — GEMFIBROZIL 600 MG: 600 TABLET, FILM COATED ORAL at 07:38

## 2017-10-20 RX ADMIN — CEFTRIAXONE 1 G: 1 INJECTION, POWDER, FOR SOLUTION INTRAMUSCULAR; INTRAVENOUS at 17:06

## 2017-10-20 RX ADMIN — ASPIRIN 81 MG 81 MG: 81 TABLET ORAL at 10:09

## 2017-10-20 RX ADMIN — ENOXAPARIN SODIUM 90 MG: 30 INJECTION SUBCUTANEOUS at 04:00

## 2017-10-20 RX ADMIN — HYDROCORTISONE SODIUM SUCCINATE 125 MG: 100 INJECTION, POWDER, FOR SOLUTION INTRAMUSCULAR; INTRAVENOUS at 10:51

## 2017-10-20 RX ADMIN — GEMFIBROZIL 600 MG: 600 TABLET, FILM COATED ORAL at 17:07

## 2017-10-20 RX ADMIN — IOPAMIDOL 100 ML: 755 INJECTION, SOLUTION INTRAVENOUS at 15:10

## 2017-10-20 RX ADMIN — HYDROCORTISONE SODIUM SUCCINATE 125 MG: 100 INJECTION, POWDER, FOR SOLUTION INTRAMUSCULAR; INTRAVENOUS at 14:12

## 2017-10-20 RX ADMIN — CALCIUM CARBONATE 500 MG (1,250 MG)-VITAMIN D3 200 UNIT TABLET 500 TABLET: at 17:12

## 2017-10-20 NOTE — PROGRESS NOTES
Bedside and Verbal shift change report given to Phyllis RESENDIZ (oncoming nurse) by Jacqulynn Buerger RN (offgoing nurse). Report included the following information SBAR, Kardex, Procedure Summary, Intake/Output, MAR and Recent Results.

## 2017-10-20 NOTE — PROGRESS NOTES
Problem: Falls - Risk of  Goal: *Absence of Falls  Document Shaye Fall Risk and appropriate interventions in the flowsheet.    Outcome: Resolved/Met Date Met: 10/20/17  Fall Risk Interventions:            Medication Interventions: Patient to call before getting OOB

## 2017-10-20 NOTE — DISCHARGE INSTRUCTIONS
PATIENT DISCHARGE INSTRUCTIONS      PATIENT DISCHARGE INSTRUCTIONS    Rosie Watts / [id number] : 1945    Admitted 10/19/2017 Discharged: 10/20/2017       · It is important that you take the medication exactly as they are prescribed. · Keep your medication in the bottles provided by the pharmacist and keep a list of the medication names, dosages, and times to be taken in your wallet. · Do not take other medications without consulting your doctor.      What to do at Home    Recommended Diet: Regular Diet    Recommended Activity: Activity as tolerated    If you experience any of the following symptoms worsening chest/back pain, please follow up with Marisol Desir MD  .        Signed By: Marisol Desir MD     2017

## 2017-10-20 NOTE — PROGRESS NOTES
General Daily Progress Note    Admit Date: 10/19/2017  Hospital day 2    Subjective:     Patient has chest ,upper rt back discomfort,not pleuritic. .   Medication side effects: none    Current Facility-Administered Medications   Medication Dose Route Frequency    aspirin chewable tablet 81 mg  81 mg Oral DAILY    atenolol (TENORMIN) tablet 75 mg  75 mg Oral DAILY    calcium-vitamin D (OS-JINA) 500 mg-200 unit tablet  500 Tab Oral DAILY    clonazePAM (KlonoPIN) tablet 1 mg  1 mg Oral DAILY PRN    DULoxetine (CYMBALTA) capsule 30 mg  30 mg Oral DAILY    fluticasone-vilanterol (BREO ELLIPTA) 100mcg-25mcg/puff  1 Puff Inhalation DAILY    gemfibrozil (LOPID) tablet 600 mg  600 mg Oral ACB&D    montelukast (SINGULAIR) tablet 10 mg  10 mg Oral DAILY    fish oil-omega-3 fatty acids 340-1,000 mg capsule 1 Cap  1 Cap Oral DAILY    albuterol (PROVENTIL VENTOLIN) nebulizer solution 2.5 mg  2.5 mg Nebulization Q4H PRN    sodium chloride (NS) flush 5-10 mL  5-10 mL IntraVENous Q8H    sodium chloride (NS) flush 5-10 mL  5-10 mL IntraVENous PRN    acetaminophen (TYLENOL) tablet 650 mg  650 mg Oral Q4H PRN    ondansetron (ZOFRAN) injection 4 mg  4 mg IntraVENous Q4H PRN    enoxaparin (LOVENOX) 90 mg  90 mg SubCUTAneous Q12H    oxyCODONE IR (ROXICODONE) tablet 5 mg  5 mg Oral Q4H PRN    cefTRIAXone (ROCEPHIN) 1 g in 0.9% sodium chloride (MBP/ADV) 50 mL  1 g IntraVENous Q24H        Review of Systems  Constitutional: negative  Respiratory: negative  Cardiovascular: negative  Gastrointestinal: negative  Musculoskeletal:positive for chest and upper back pain with movement    Objective:     Patient Vitals for the past 8 hrs:   BP Temp Pulse Resp SpO2   10/20/17 0551 103/85 98.1 °F (36.7 °C) (!) 57 18 95 %   10/20/17 0124 120/57 98.7 °F (37.1 °C) (!) 55 18 93 %             Physical Exam:   Visit Vitals    /85    Pulse (!) 57    Temp 98.1 °F (36.7 °C)    Resp 18    Ht 5' 5\" (1.651 m)    Wt 205 lb 11 oz (93.3 kg)  SpO2 95%    Breastfeeding No    BMI 34.23 kg/m2     General appearance: alert, fatigued, cooperative, no distress, appears stated age  Lungs: clear to auscultation bilaterally  Heart: regular rate and rhythm, S1, S2 normal, no murmur, click, rub or gallop  Abdomen: soft, non-tender. Bowel sounds normal. No masses,  no organomegaly  Extremities: extremities normal, atraumatic, no cyanosis or edema  Back: some mild tenderness rt trap           Data Review   Recent Results (from the past 24 hour(s))   EKG, 12 LEAD, INITIAL    Collection Time: 10/19/17 10:43 AM   Result Value Ref Range    Ventricular Rate 55 BPM    Atrial Rate 55 BPM    P-R Interval 200 ms    QRS Duration 88 ms    Q-T Interval 408 ms    QTC Calculation (Bezet) 390 ms    Calculated P Axis 44 degrees    Calculated R Axis -9 degrees    Calculated T Axis 3 degrees    Diagnosis       Sinus bradycardia  Low voltage QRS  Poor R wave progression   When compared with ECG of 03-FEB-2000 09:47,  Previous ECG has undetermined rhythm, needs review  Confirmed by Yared Davila ([id number]) on 10/19/2017 5:43:42 PM     CBC WITH AUTOMATED DIFF    Collection Time: 10/19/17 11:27 AM   Result Value Ref Range    WBC 3.2 (L) 3.6 - 11.0 K/uL    RBC 4.62 3.80 - 5.20 M/uL    HGB 13.5 11.5 - 16.0 g/dL    HCT 40.5 35.0 - 47.0 %    MCV 87.7 80.0 - 99.0 FL    MCH 29.2 26.0 - 34.0 PG    MCHC 33.3 30.0 - 36.5 g/dL    RDW 13.5 11.5 - 14.5 %    PLATELET 96 (L) 152 - 400 K/uL    NEUTROPHILS 42 32 - 75 %    LYMPHOCYTES 48 12 - 49 %    MONOCYTES 10 5 - 13 %    EOSINOPHILS 0 0 - 7 %    BASOPHILS 0 0 - 1 %    ABS. NEUTROPHILS 1.3 (L) 1.8 - 8.0 K/UL    ABS. LYMPHOCYTES 1.6 0.8 - 3.5 K/UL    ABS. MONOCYTES 0.3 0.0 - 1.0 K/UL    ABS. EOSINOPHILS 0.0 0.0 - 0.4 K/UL    ABS.  BASOPHILS 0.0 0.0 - 0.1 K/UL    DF SMEAR SCANNED      RBC COMMENTS NORMOCYTIC, NORMOCHROMIC     METABOLIC PANEL, COMPREHENSIVE    Collection Time: 10/19/17 11:27 AM   Result Value Ref Range    Sodium 137 136 - 145 mmol/L Potassium 4.0 3.5 - 5.1 mmol/L    Chloride 102 97 - 108 mmol/L    CO2 30 21 - 32 mmol/L    Anion gap 5 5 - 15 mmol/L    Glucose 127 (H) 65 - 100 mg/dL    BUN 17 6 - 20 MG/DL    Creatinine 0.68 0.55 - 1.02 MG/DL    BUN/Creatinine ratio 25 (H) 12 - 20      GFR est AA >60 >60 ml/min/1.73m2    GFR est non-AA >60 >60 ml/min/1.73m2    Calcium 9.3 8.5 - 10.1 MG/DL    Bilirubin, total 0.5 0.2 - 1.0 MG/DL    ALT (SGPT) 33 12 - 78 U/L    AST (SGOT) 23 15 - 37 U/L    Alk.  phosphatase 63 45 - 117 U/L    Protein, total 7.9 6.4 - 8.2 g/dL    Albumin 4.1 3.5 - 5.0 g/dL    Globulin 3.8 2.0 - 4.0 g/dL    A-G Ratio 1.1 1.1 - 2.2     TROPONIN I    Collection Time: 10/19/17 11:27 AM   Result Value Ref Range    Troponin-I, Qt. <0.04 <0.05 ng/mL   CK W/ REFLX CKMB    Collection Time: 10/19/17 11:27 AM   Result Value Ref Range    CK 37 26 - 192 U/L   D DIMER    Collection Time: 10/19/17 11:47 AM   Result Value Ref Range    D-dimer 1.21 (H) 0.00 - 0.65 mg/L FEU   PROTHROMBIN TIME + INR    Collection Time: 10/19/17 11:47 AM   Result Value Ref Range    INR 1.0 0.9 - 1.1      Prothrombin time 10.4 9.0 - 11.1 sec   URINALYSIS W/ REFLEX CULTURE    Collection Time: 10/19/17  1:07 PM   Result Value Ref Range    Color YELLOW/STRAW      Appearance CLOUDY (A) CLEAR      Specific gravity 1.015 1.003 - 1.030      pH (UA) 6.5 5.0 - 8.0      Protein NEGATIVE  NEG mg/dL    Glucose NEGATIVE  NEG mg/dL    Ketone NEGATIVE  NEG mg/dL    Bilirubin NEGATIVE  NEG      Blood TRACE (A) NEG      Urobilinogen 0.2 0.2 - 1.0 EU/dL    Nitrites NEGATIVE  NEG      Leukocyte Esterase LARGE (A) NEG      WBC >100 (H) 0 - 4 /hpf    RBC 0-5 0 - 5 /hpf    Epithelial cells FEW FEW /lpf    Bacteria 4+ (A) NEG /hpf    UA:UC IF INDICATED URINE CULTURE ORDERED (A) CNI      Other: Renal Epithelial cells Present     METABOLIC PANEL, BASIC    Collection Time: 10/20/17  4:01 AM   Result Value Ref Range    Sodium 138 136 - 145 mmol/L    Potassium 3.9 3.5 - 5.1 mmol/L    Chloride 103 97 - 108 mmol/L    CO2 27 21 - 32 mmol/L    Anion gap 8 5 - 15 mmol/L    Glucose 121 (H) 65 - 100 mg/dL    BUN 21 (H) 6 - 20 MG/DL    Creatinine 0.70 0.55 - 1.02 MG/DL    BUN/Creatinine ratio 30 (H) 12 - 20      GFR est AA >60 >60 ml/min/1.73m2    GFR est non-AA >60 >60 ml/min/1.73m2    Calcium 9.2 8.5 - 10.1 MG/DL   CBC W/O DIFF    Collection Time: 10/20/17  4:01 AM   Result Value Ref Range    WBC 4.9 3.6 - 11.0 K/uL    RBC 4.27 3.80 - 5.20 M/uL    HGB 12.9 11.5 - 16.0 g/dL    HCT 37.9 35.0 - 47.0 %    MCV 88.8 80.0 - 99.0 FL    MCH 30.2 26.0 - 34.0 PG    MCHC 34.0 30.0 - 36.5 g/dL    RDW 13.4 11.5 - 14.5 %    PLATELET 979 (L) 191 - 400 K/uL           Assessment:     Principal Problem:    Pulmonary emboli (HCC) (10/19/2017)    Active Problems:    Mixed hyperlipidemia (1/17/2011)      Essential hypertension, benign (11/30/2012)      Esophageal reflux (3/29/2013)      Other chest pain (10/20/2017)      Simple chronic bronchitis (HCC) (10/20/2017)        Plan:     Nonpleuritic chest and back pain with elevated D Dimer,equivicol NM Lung scan,negative dopplers,hx of bladder ca,recent fall. Remote Hx of SOB following iodine contrast.Will ask pulmonary to advise. Cosider CTA with precautions

## 2017-10-20 NOTE — PROGRESS NOTES
Blood pressure 114/49 heart rate 74. Dr. Gillian Petit notified telephone order noted to hold the atenolol 75 mg today.

## 2017-10-20 NOTE — CDMP QUERY
After further study, do you concur with the findings of UTI noted in the H&P?    ? UTI ruled in as stated in H&P req CTX, cx pending  ? UTI ruled out  ? Other Explanation of the clinical findings  ? Unable to Determine (no explanation for clinical findings)    The medical record reflects the following clinical findings, risk factors and treatment:     Risk Factors:  72F adm w/ PE  Clinical Indicators:   ua--4 bact, few epith, large le, wbc >100  Treatment:CTX, cx pending    Please clarify and document your clinical opinion in the progress notes and discharge summary including the definitive and/or presumptive diagnosis, (suspected or probable), related to the above clinical findings. Please include clinical findings supporting your diagnosis.   Thanks,  Jackson Eden RN/cdmp

## 2017-10-20 NOTE — DISCHARGE SUMMARY
Physician Discharge Summary       Patient: Arelis Martines MRN: [id number]  SSN: [id number]    YOB: 1945  Age: 67 y.o. Sex: female    PCP: Danie Buck MD    Admit date: 10/19/2017  Admitting Provider: Alana Jung MD    Discharge date: 10/20/2017  Discharging Provider: Danie Buck MD    * Admission Diagnoses: Pulmonary emboli [facility])    * Discharge Diagnoses:    Hospital Problems as of 10/20/2017  Date Reviewed: 10/19/2017          Codes Class Noted - Resolved POA    * (Principal)Other chest pain ICD-10-CM: R07.89  ICD-9-CM: 786.59  10/20/2017 - Present Unknown        Simple chronic bronchitis ([facility] ICD-10-CM: J41.0  ICD-9-CM: 491.0  10/20/2017 - Present Unknown        Essential hypertension, benign ICD-10-CM: I10  ICD-9-CM: 401.1  11/30/2012 - Present Yes        Pulmonary nodule, right ICD-10-CM: R91.1  ICD-9-CM: 793.11  10/20/2017 - Present Unknown        Cystitis ICD-10-CM: N30.90  ICD-9-CM: 595.9  10/20/2017 - Present Unknown        Esophageal reflux ICD-10-CM: K21.9  ICD-9-CM: 530.81  3/29/2013 - Present Yes        Mixed hyperlipidemia ICD-10-CM: E78.2  ICD-9-CM: 272.2  1/17/2011 - Present Yes        RESOLVED: Pulmonary emboli ([facility] ICD-10-CM: I26.99  ICD-9-CM: 415.19  10/19/2017 - 10/20/2017 Unknown              * Hospital Course: Elderly female copd patient was admitted through the ER on 10/19/17 for chest and upper back pain with positive D Dimer and equivocal NM Lung Scan. CTA not performed innitially due to hx of iodine allergy. She was admitted to telemetry and anticoagulated with lovenox. Pulmonary was consulted,Dr Nicholas Leal see notes. CTA was performed with allergy protocol without problems on 10/20/17 and revealed no emboli,bur a smalll RLL nodule. The patient was discharged home in good condition on 10/20/17. Advised of nodule f/u ct scans in the future    * Procedures: *  * No surgery found *      Consults: Pulmonary/Intensive care    Significant Diagnostic Studies: radiology: CT scan: no emboli,RLL NODULE    Discharge Exam:  Visit Vitals    /49    Pulse 74    Temp 98 °F (36.7 °C)    Resp 18    Ht 5' 5\" (1.651 m)    Wt 205 lb 11 oz (93.3 kg)    SpO2 95%    Breastfeeding No    BMI 34.23 kg/m2     General appearance: alert, cooperative, no distress, appears stated age  Lungs: clear to auscultation bilaterally  Heart: regular rate and rhythm, S1, S2 normal, no murmur, click, rub or gallop  Abdomen: soft, non-tender. Bowel sounds normal. No masses,  no organomegaly  Extremities: extremities normal, atraumatic, no cyanosis or edema    * Discharge Condition: good  * Disposition: Home    Discharge Medications:  Current Discharge Medication List      START taking these medications    Details   nitrofurantoin, macrocrystal-monohydrate, (MACROBID) 100 mg capsule Take 1 Cap by mouth two (2) times a day. Qty: 10 Cap, Refills: 0      traMADol (ULTRAM) 50 mg tablet Take 1 Tab by mouth every six (6) hours as needed for Pain. Max Daily Amount: 200 mg. Qty: 30 Tab, Refills: 0         CONTINUE these medications which have NOT CHANGED    Details   montelukast (SINGULAIR) 10 mg tablet Take 1 tablet by mouth  daily  Qty: 90 Tab, Refills: 3      gemfibrozil (LOPID) 600 mg tablet Take 1 tablet by mouth  twice a day  Qty: 180 Tab, Refills: 1      atenolol (TENORMIN) 50 mg tablet Take 1 and 1/2 tablets by  mouth daily  Qty: 135 Tab, Refills: 1      DULoxetine (CYMBALTA) 30 mg capsule Take 1 capsule by mouth  daily  Qty: 90 Cap, Refills: 1      Calcium Carbonate-Vit D3-Min 600 mg calcium- 400 unit tab Take 1 Tab by mouth daily. fluticasone-salmeterol (ADVAIR DISKUS) 250-50 mcg/dose diskus inhaler Take 1 Puff by inhalation every twelve (12) hours. Qty: 3 Inhaler, Refills: 3    Associated Diagnoses: Chronic airway obstruction, not elsewhere classified      omega-3 fatty acids-vitamin e (FISH OIL) 1,000 mg Cap Take 1 Cap by mouth two (2) times a day.     Associated Diagnoses: Mixed hyperlipidemia      lysine (L-LYSINE) 500 mg Tab tablet Take  by mouth three (3) times daily (with meals). Associated Diagnoses: Mixed hyperlipidemia      aspirin 81 mg chewable tablet Take 81 mg by mouth daily. Associated Diagnoses: Essential hypertension, benign      fexofenadine (ALLEGRA) 180 mg tablet Take 180 mg by mouth daily. clonazePAM (KLONOPIN) 1 mg tablet Take 1 Tab by mouth daily as needed. Qty: 90 Tab, Refills: 1      albuterol (PROVENTIL, VENTOLIN) 90 mcg/Actuation inhaler Take 2 Puffs by inhalation every six (6) hours as needed. * Follow-up Care/Patient Instructions:   Activity: Activity as tolerated  Diet: Regular Diet  Wound Care: None needed    Follow-up Information     Follow up With Details Comments Contact Info    Nancy Berry MD On 10/26/2017 11;15am [facility] follow-up [address] [place] [address]  [phone]            Signed:  Nancy Berry MD  10/20/2017  4:18 PM

## 2017-10-20 NOTE — CDMP QUERY
Patient is noted to have a BMI of 34. Please clarify if this patient is:     =>Morbidly obese (BMI ³ 40)  =>Obese (BMI 30 - 39.9)  =>Overweight (BMI 25 - 29.9)  =>Other explanation of clinical findings  =>Unable to determine (no explanation for clinical findings)    Presentation: 5'5\", 204 lbs = BMI 34    REFERENCE:  The [address] [place] [address] has issued a statement indicating that, \"Individuals who are overweight, obese, or morbidly obese are at an increased risk for certain medical conditions when compared to persons of normal weight. Therefore, these conditions are always clinically significant and reportable when documented by the provider. Please clarify and document your clinical opinion in the progress notes and discharge summary, including the definitive and or presumptive diagnosis, (suspected or probable), related to the above clinical findings. Please include clinical findings supporting your diagnosis.    Thanks,  Anthony Hassan RN/CDMP

## 2017-10-20 NOTE — PROGRESS NOTES
CM spoke with  regarding pt's discharge plan. Pt is going to discharge today back to home after her Angio chest ct become negative. Pt is very independent with ADLs and IADLs. Cm met pt,her daughter and  and discussed discharge plan. CM will assist pt to schedule a PCP followup appointment. Care Management Interventions  PCP Verified by CM: Yes  Mode of Transport at Discharge: Self (pt  will transport in a private vehicle.)  Discharge Durable Medical Equipment: No (No DME- Independent with ADLs and IADLs including driving.)  Health Maintenance Reviewed: Yes  Physical Therapy Consult: No  Occupational Therapy Consult: No  Speech Therapy Consult: No  Current Support Network: Lives with Spouse (Two story home has 2 steps at entrance.  Access to bedroom and bathroom  at 1st level.)  Confirm Follow Up Transport: Family  Plan discussed with Pt/Family/Caregiver: Yes  Discharge Location  Discharge Placement: [address]  Ext [phone]

## 2017-10-20 NOTE — CONSULTS
[facility]  Pulmonary, Critical Care, and Sleep Medicine    Initial Patient Consult    Name: Angie Garcia MRN: [id number]   : 1945 Hospital: [place] [address]   Date: 10/20/2017        IMPRESSION:   · Atypical CP  · Abnormal VQ scan  · Elevated D-Dimer      RECOMMENDATIONS:   · Needs angio chest ct  · Will pre medicate before test  · On SQ lovenox  · If ct scan is negative will DC anticoagulation and pt can go home  · If ct scan is positive then this is an unprovoked PE and pt needs anticoagulation for life     Subjective: This patient has been seen and evaluated at the request of Dr. Marques Salamanca for abnormal VQ scan. Patient is a 67 y.o. female presented with atypical cp, no SOB  In the ER D-Dimer was elevated, VQ scan non dx, LE doppler negative  Patient started on SQ lovenox  Today pt in no distress  No acute complaints      Past Medical History:   Diagnosis Date    Allergic rhinitis, cause unspecified 2011    Asthma     Asthma     Bladder [facility] ([facility] 2010    Chronic airway obstruction, not elsewhere classified 2011    Depression 2011    Diverticulosis 2010    Encounter for long-term (current) use of other medications 2011    Essential hypertension, benign 2012    Hypercholesterolemia     Mixed hyperlipidemia 2011    Sleep apnea     CPAP      Past Surgical History:   Procedure Laterality Date    ENDOSCOPY, COLON, DIAGNOSTIC      HX CARPAL TUNNEL RELEASE      right    HX CHOLECYSTECTOMY      HX CYSTOCELE REPAIR      HX HYSTERECTOMY      HX ORTHOPAEDIC      left thumb surgery    HX UROLOGICAL      resection of bladder tumor; bladder sling    REPAIR OF RECTOCELE        Prior to Admission medications    Medication Sig Start Date End Date Taking?  Authorizing Provider   montelukast (SINGULAIR) 10 mg tablet Take 1 tablet by mouth  daily 9/15/17  Yes Surjit Dave MD   gemfibrozil (LOPID) 600 mg tablet Take 1 tablet by mouth  twice a day 9/13/17  Yes Flip Spain MD   atenolol (TENORMIN) 50 mg tablet Take 1 and 1/2 tablets by  mouth daily 7/5/17  Yes Flip Spain MD   DULoxetine (CYMBALTA) 30 mg capsule Take 1 capsule by mouth  daily 7/5/17  Yes Flip Spain MD   Calcium Carbonate-Vit D3-Min 600 mg calcium- 400 unit tab Take 1 Tab by mouth daily. Yes Historical Provider   fluticasone-salmeterol (ADVAIR DISKUS) 250-50 mcg/dose diskus inhaler Take 1 Puff by inhalation every twelve (12) hours. 3/30/17  Yes Flip Spain MD   omega-3 fatty acids-vitamin e (FISH OIL) 1,000 mg Cap Take 1 Cap by mouth two (2) times a day. Yes Historical Provider   lysine (L-LYSINE) 500 mg Tab tablet Take  by mouth three (3) times daily (with meals). Yes Historical Provider   aspirin 81 mg chewable tablet Take 81 mg by mouth daily. Yes Historical Provider   fexofenadine (ALLEGRA) 180 mg tablet Take 180 mg by mouth daily. Yes Historical Provider   clonazePAM (KLONOPIN) 1 mg tablet Take 1 Tab by mouth daily as needed. 9/13/17   Flip Spain MD   albuterol (PROVENTIL, VENTOLIN) 90 mcg/Actuation inhaler Take 2 Puffs by inhalation every six (6) hours as needed.     Historical Provider     Allergies   Allergen Reactions    Ciprofloxacin Unknown (comments)    Hydrocodone Unknown (comments)    Iodine Shortness of Breath    Lithium Unknown (comments)    Pravastatin Myalgia    Simvastatin Unknown (comments)    Wellbutrin [Bupropion Hcl] Unknown (comments)      Social History   Substance Use Topics    Smoking status: Never Smoker    Smokeless tobacco: Never Used    Alcohol use No      Comment: occ. wine      Family History   Problem Relation Age of Onset    Diabetes Mother     Heart Disease Mother     Elevated Lipids Mother     Cancer Mother      basal cell ca    Hypertension Father     Cancer Father      basal cell ca    Cancer Sister      breast    Breast Cancer Sister 50s        Current Facility-Administered Medications   Medication Dose Route Frequency    aspirin chewable tablet 81 mg  81 mg Oral DAILY    atenolol (TENORMIN) tablet 75 mg  75 mg Oral DAILY    calcium-vitamin D (OS-JINA) 500 mg-200 unit tablet  500 Tab Oral DAILY    DULoxetine (CYMBALTA) capsule 30 mg  30 mg Oral DAILY    fluticasone-vilanterol (BREO ELLIPTA) 100mcg-25mcg/puff  1 Puff Inhalation DAILY    gemfibrozil (LOPID) tablet 600 mg  600 mg Oral ACB&D    montelukast (SINGULAIR) tablet 10 mg  10 mg Oral DAILY    fish oil-omega-3 fatty acids 340-1,000 mg capsule 1 Cap  1 Cap Oral DAILY    sodium chloride (NS) flush 5-10 mL  5-10 mL IntraVENous Q8H    enoxaparin (LOVENOX) 90 mg  90 mg SubCUTAneous Q12H    cefTRIAXone (ROCEPHIN) 1 g in 0.9% sodium chloride (MBP/ADV) 50 mL  1 g IntraVENous Q24H       Review of Systems:  A comprehensive review of systems was negative except for: Respiratory: positive for pleurisy/chest pain    Objective:   Vital Signs:    Visit Vitals    /85    Pulse (!) 57    Temp 98.1 °F (36.7 °C)    Resp 18    Ht 5' 5\" (1.651 m)    Wt 93.3 kg (205 lb 11 oz)    SpO2 95%    Breastfeeding No    BMI 34.23 kg/m2       O2 Device: Room air       Temp (24hrs), Av.9 °F (36.6 °C), Min:97.5 °F (36.4 °C), Max:98.7 °F (37.1 °C)       Intake/Output:   Last shift:         Last 3 shifts: 10/18 1901 - 10/20 0700  In: 120 [P.O.:120]  Out: -     Intake/Output Summary (Last 24 hours) at 10/20/17 0931  Last data filed at 10/20/17 0200   Gross per 24 hour   Intake              120 ml   Output                0 ml   Net              120 ml      Physical Exam:   General:  Alert, cooperative, no distress, appears stated age. Head:  Normocephalic, without obvious abnormality, atraumatic. Eyes:  Conjunctivae/corneas clear. PERRL, EOMs intact. Nose: Nares normal. Septum midline. Mucosa normal. No drainage or sinus tenderness.    Throat: Lips, mucosa, and tongue normal. Teeth and gums normal.   Neck: Supple, symmetrical, trachea midline, no adenopathy, thyroid: no enlargment/tenderness/nodules, no carotid bruit and no JVD. Back:   Symmetric, no curvature. ROM normal.   Lungs:   Clear to auscultation bilaterally. Chest wall:  No tenderness or deformity. Heart:  Regular rate and rhythm, S1, S2 normal, no murmur, click, rub or gallop. Abdomen:   Soft, non-tender. Bowel sounds normal. No masses,  No organomegaly. Extremities: Extremities normal, atraumatic, no cyanosis or edema. Pulses: 2+ and symmetric all extremities. Skin: Skin color, texture, turgor normal. No rashes or lesions   Lymph nodes: Cervical, supraclavicular, and axillary nodes normal.   Neurologic: Grossly nonfocal     Data review:     Recent Results (from the past 24 hour(s))   EKG, 12 LEAD, INITIAL    Collection Time: 10/19/17 10:43 AM   Result Value Ref Range    Ventricular Rate 55 BPM    Atrial Rate 55 BPM    P-R Interval 200 ms    QRS Duration 88 ms    Q-T Interval 408 ms    QTC Calculation (Bezet) 390 ms    Calculated P Axis 44 degrees    Calculated R Axis -9 degrees    Calculated T Axis 3 degrees    Diagnosis       Sinus bradycardia  Low voltage QRS  Poor R wave progression   When compared with ECG of 03-FEB-2000 09:47,  Previous ECG has undetermined rhythm, needs review  Confirmed by Yovani Santizo ([id number]) on 10/19/2017 5:43:42 PM     CBC WITH AUTOMATED DIFF    Collection Time: 10/19/17 11:27 AM   Result Value Ref Range    WBC 3.2 (L) 3.6 - 11.0 K/uL    RBC 4.62 3.80 - 5.20 M/uL    HGB 13.5 11.5 - 16.0 g/dL    HCT 40.5 35.0 - 47.0 %    MCV 87.7 80.0 - 99.0 FL    MCH 29.2 26.0 - 34.0 PG    MCHC 33.3 30.0 - 36.5 g/dL    RDW 13.5 11.5 - 14.5 %    PLATELET 96 (L) 971 - 400 K/uL    NEUTROPHILS 42 32 - 75 %    LYMPHOCYTES 48 12 - 49 %    MONOCYTES 10 5 - 13 %    EOSINOPHILS 0 0 - 7 %    BASOPHILS 0 0 - 1 %    ABS. NEUTROPHILS 1.3 (L) 1.8 - 8.0 K/UL    ABS. LYMPHOCYTES 1.6 0.8 - 3.5 K/UL    ABS.  MONOCYTES 0.3 0.0 - 1.0 K/UL    ABS. EOSINOPHILS 0.0 0.0 - 0.4 K/UL    ABS. BASOPHILS 0.0 0.0 - 0.1 K/UL    DF SMEAR SCANNED      RBC COMMENTS NORMOCYTIC, NORMOCHROMIC     METABOLIC PANEL, COMPREHENSIVE    Collection Time: 10/19/17 11:27 AM   Result Value Ref Range    Sodium 137 136 - 145 mmol/L    Potassium 4.0 3.5 - 5.1 mmol/L    Chloride 102 97 - 108 mmol/L    CO2 30 21 - 32 mmol/L    Anion gap 5 5 - 15 mmol/L    Glucose 127 (H) 65 - 100 mg/dL    BUN 17 6 - 20 MG/DL    Creatinine 0.68 0.55 - 1.02 MG/DL    BUN/Creatinine ratio 25 (H) 12 - 20      GFR est AA >60 >60 ml/min/1.73m2    GFR est non-AA >60 >60 ml/min/1.73m2    Calcium 9.3 8.5 - 10.1 MG/DL    Bilirubin, total 0.5 0.2 - 1.0 MG/DL    ALT (SGPT) 33 12 - 78 U/L    AST (SGOT) 23 15 - 37 U/L    Alk.  phosphatase 63 45 - 117 U/L    Protein, total 7.9 6.4 - 8.2 g/dL    Albumin 4.1 3.5 - 5.0 g/dL    Globulin 3.8 2.0 - 4.0 g/dL    A-G Ratio 1.1 1.1 - 2.2     TROPONIN I    Collection Time: 10/19/17 11:27 AM   Result Value Ref Range    Troponin-I, Qt. <0.04 <0.05 ng/mL   CK W/ REFLX CKMB    Collection Time: 10/19/17 11:27 AM   Result Value Ref Range    CK 37 26 - 192 U/L   D DIMER    Collection Time: 10/19/17 11:47 AM   Result Value Ref Range    D-dimer 1.21 (H) 0.00 - 0.65 mg/L FEU   PROTHROMBIN TIME + INR    Collection Time: 10/19/17 11:47 AM   Result Value Ref Range    INR 1.0 0.9 - 1.1      Prothrombin time 10.4 9.0 - 11.1 sec   URINALYSIS W/ REFLEX CULTURE    Collection Time: 10/19/17  1:07 PM   Result Value Ref Range    Color YELLOW/STRAW      Appearance CLOUDY (A) CLEAR      Specific gravity 1.015 1.003 - 1.030      pH (UA) 6.5 5.0 - 8.0      Protein NEGATIVE  NEG mg/dL    Glucose NEGATIVE  NEG mg/dL    Ketone NEGATIVE  NEG mg/dL    Bilirubin NEGATIVE  NEG      Blood TRACE (A) NEG      Urobilinogen 0.2 0.2 - 1.0 EU/dL    Nitrites NEGATIVE  NEG      Leukocyte Esterase LARGE (A) NEG      WBC >100 (H) 0 - 4 /hpf    RBC 0-5 0 - 5 /hpf    Epithelial cells FEW FEW /lpf    Bacteria 4+ (A) NEG /hpf    UA:UC IF INDICATED URINE CULTURE ORDERED (A) CNI      Other: Renal Epithelial cells Present     METABOLIC PANEL, BASIC    Collection Time: 10/20/17  4:01 AM   Result Value Ref Range    Sodium 138 136 - 145 mmol/L    Potassium 3.9 3.5 - 5.1 mmol/L    Chloride 103 97 - 108 mmol/L    CO2 27 21 - 32 mmol/L    Anion gap 8 5 - 15 mmol/L    Glucose 121 (H) 65 - 100 mg/dL    BUN 21 (H) 6 - 20 MG/DL    Creatinine 0.70 0.55 - 1.02 MG/DL    BUN/Creatinine ratio 30 (H) 12 - 20      GFR est AA >60 >60 ml/min/1.73m2    GFR est non-AA >60 >60 ml/min/1.73m2    Calcium 9.2 8.5 - 10.1 MG/DL   CBC W/O DIFF    Collection Time: 10/20/17  4:01 AM   Result Value Ref Range    WBC 4.9 3.6 - 11.0 K/uL    RBC 4.27 3.80 - 5.20 M/uL    HGB 12.9 11.5 - 16.0 g/dL    HCT 37.9 35.0 - 47.0 %    MCV 88.8 80.0 - 99.0 FL    MCH 30.2 26.0 - 34.0 PG    MCHC 34.0 30.0 - 36.5 g/dL    RDW 13.4 11.5 - 14.5 %    PLATELET 486 (L) 641 - 400 K/uL       Imaging:  I have personally reviewed the patients radiographs and have reviewed the reports:  CXR and VQ scan reviewed        Hue Mendosa MD

## 2017-10-20 NOTE — PROGRESS NOTES
Angio chest ct negative for PE, only bibasilar atx and a small right pulmonary nodule that needs f/u in 6 months  LE dopplers also negative for DVT  No need for anticoagulation  Can be discharged home today  Marian Odom MD

## 2017-10-20 NOTE — PROGRESS NOTES
Discharge instructions reviewed with the patient. The daughter at the bedside. Both able to verbalize an understanding. The daughter with all personal belongings. The patient left via wheelchair.

## 2017-10-21 LAB
BACTERIA SPEC CULT: ABNORMAL
CC UR VC: ABNORMAL
SERVICE CMNT-IMP: ABNORMAL

## 2017-10-23 ENCOUNTER — PATIENT OUTREACH (OUTPATIENT)
Dept: FAMILY MEDICINE CLINIC | Age: 72
End: 2017-10-23

## 2017-10-23 NOTE — PROGRESS NOTES
Jessica Willard is a 67 y.o. female   This patient was received as a referral from Gayatri Score: Low Risk            9       Total Score        3 Has Seen PCP in Last 6 Months (Yes=3, No=0)    2 . Living with Significant Other. Assisted Living. LTAC. SNF. or   Rehab    4 Charlson Comorbidity Score (Age + Comorbid Conditions)        Criteria that do not apply:    Patient Length of Stay (>5 days = 3)    IP Visits Last 12 Months (1-3=4, 4=9, >4=11)    Pt. Coverage (Medicare=5 , Medicaid, or Self-Pay=4)      Patient's challenges to self management identified:  Lack of knowledge of disease process. Medication Management:  good adherence and good understanding    Summary of patients top three problems:     Problem 1: Shoulder blade pain that radiates to neck and back. Problem 2: COPD with new lung nodule, repeat chest CT in 6-12 months. CT of the Chest 10/20/2017  FINDINGS:  LUNGS:  There are areas of atelectasis or consolidation in both lung bases. In  addition, however, there is a 7.3 mm pulmonary nodule in the right lower lobe  posteriorly (4-83). Calcified granuloma noted in the lingula. Ying Cielo PLEURA: No pleural effusion or pneumothorax. TRACHEA/BRONCHI: Patent.     PULMONARY ARTERIES: The pulmonary arteries are well enhanced and no pulmonary  emboli are identified.     MEDIASTINUM/BORIS: There is no mediastinal or hilar adenopathy or mass. AORTA: The aorta enhances normally without evidence of aneurysm or dissection.     UPPER ABDOMEN: The visualized portions of the upper abdominal organs are normal.     BONES: No sclerotic or lytic lesion.     IMPRESSION  IMPRESSION:   1. No evidence of pulmonary embolus. 2. Bibasilar atelectasis  3. Pulmonary nodule as described above. Please see suggested follow-up below. .        Guidelines by the Fleischner society (Radiology 2017 special report) suggest  that patients with a low risk for lung cancer who have solid nodule(s) greater  than 6 mm and less than or equal to 8 mm in diameter should have follow up in  approximately 6 to 12 months for single nodule and 3 to 6 months for multiple  solid nodules and, if no change, at 18-24 months. In patients with a higher  risk, such as smokers, follow-up is recommended in 6 to 12 months for single  nodule and 3 to 6 months for multiple nodules and, if no change, at 18-24  months. Patients with a known malignancy are at increased risk for metastasis  and should receive a three month follow-up.                Patients motivational level on a scale of 0-10: Did not discuss patient was sleeping and spouse woke her up. Advance Care Planning: Patient was sleeping and spouse work her up to talk to me. Patient was offered the opportunity to discuss advance care planning:  no     Does patient have an Advance Directive:  no   If no, did you provide information on Advance Care Planning?  no     [employer], Referrals, and Durable Medical Equipment: Seen by Pulmomology    Follow up appointments:  10/26 at 11:15am  Goals      Attends follow-up appointments as directed.  Knowledge and adherence of prescribed medication (ie. action, side effects, missed dose, etc.).  Prevent complications post hospitalization. Patient verbalized understanding of all information discussed. Patient has this Nurse Navigators contact information for any further questions, concerns, or needs.

## 2017-10-26 ENCOUNTER — OFFICE VISIT (OUTPATIENT)
Dept: FAMILY MEDICINE CLINIC | Age: 72
End: 2017-10-26

## 2017-10-26 DIAGNOSIS — Z00.00 ANNUAL PHYSICAL EXAM: Primary | ICD-10-CM

## 2017-10-26 NOTE — PROGRESS NOTES
Chief Complaint   Patient presents with   [facility] Follow Up     F/U from [id number] [place] [address].     1. Have you been to the ER, urgent care clinic since your last visit? Hospitalized since your last visit? [address] [place] [address], on 10-19-17. 2. Have you seen or consulted any other health care providers outside of the [address] [place] [address] since your last visit? Include any pap smears or colon screening.  No

## 2017-11-03 ENCOUNTER — PATIENT OUTREACH (OUTPATIENT)
Dept: FAMILY MEDICINE CLINIC | Age: 72
End: 2017-11-03

## 2017-11-15 ENCOUNTER — HOSPITAL ENCOUNTER (OUTPATIENT)
Dept: MAMMOGRAPHY | Age: 72
Discharge: HOME | End: 2017-11-15
Attending: OBSTETRICS & GYNECOLOGY
Payer: MEDICARE

## 2017-11-15 DIAGNOSIS — Z12.31 VISIT FOR SCREENING MAMMOGRAM: ICD-10-CM

## 2017-11-15 PROCEDURE — 77067 SCR MAMMO BI INCL CAD: CPT

## 2017-11-28 ENCOUNTER — PATIENT OUTREACH (OUTPATIENT)
Dept: FAMILY MEDICINE CLINIC | Age: 72
End: 2017-11-28

## 2017-12-08 RX ORDER — DULOXETIN HYDROCHLORIDE 30 MG/1
CAPSULE, DELAYED RELEASE ORAL
Qty: 90 CAP | Refills: 1 | Status: SHIPPED | OUTPATIENT
Start: 2017-12-08 | End: 2018-05-11 | Stop reason: SDUPTHER

## 2017-12-08 RX ORDER — ATENOLOL 50 MG/1
TABLET ORAL
Qty: 135 TAB | Refills: 1 | Status: SHIPPED | OUTPATIENT
Start: 2017-12-08 | End: 2018-05-11 | Stop reason: SDUPTHER

## 2017-12-31 PROBLEM — F33.9 RECURRENT DEPRESSION (HCC): Status: ACTIVE | Noted: 2017-12-31

## 2018-03-12 ENCOUNTER — OFFICE VISIT (OUTPATIENT)
Dept: FAMILY MEDICINE CLINIC | Age: 73
End: 2018-03-12

## 2018-03-12 DIAGNOSIS — I10 ESSENTIAL HYPERTENSION, BENIGN: Primary | ICD-10-CM

## 2018-03-12 DIAGNOSIS — R91.1 PULMONARY NODULE, RIGHT: ICD-10-CM

## 2018-03-12 DIAGNOSIS — K21.9 GASTROESOPHAGEAL REFLUX DISEASE WITHOUT ESOPHAGITIS: ICD-10-CM

## 2018-03-12 DIAGNOSIS — C67.9 MALIGNANT NEOPLASM OF URINARY BLADDER, UNSPECIFIED SITE (HCC): ICD-10-CM

## 2018-03-12 DIAGNOSIS — E78.2 MIXED HYPERLIPIDEMIA: ICD-10-CM

## 2018-05-11 RX ORDER — DULOXETIN HYDROCHLORIDE 30 MG/1
CAPSULE, DELAYED RELEASE ORAL
Qty: 90 CAP | Refills: 0 | Status: SHIPPED | OUTPATIENT
Start: 2018-05-11 | End: 2018-12-02 | Stop reason: SDUPTHER

## 2018-05-11 RX ORDER — ATENOLOL 50 MG/1
TABLET ORAL
Qty: 135 TAB | Refills: 0 | Status: SHIPPED | OUTPATIENT
Start: 2018-05-11 | End: 2018-12-02 | Stop reason: SDUPTHER

## 2018-06-15 RX ORDER — GEMFIBROZIL 600 MG/1
TABLET, FILM COATED ORAL
Qty: 180 TAB | Refills: 2 | Status: SHIPPED | OUTPATIENT
Start: 2018-06-15 | End: 2019-04-01 | Stop reason: SDUPTHER

## 2018-08-08 DIAGNOSIS — F32.A DEPRESSION, UNSPECIFIED DEPRESSION TYPE: Primary | ICD-10-CM

## 2018-08-08 NOTE — TELEPHONE ENCOUNTER
Print/Check     Last Visit: 3/12-Joyner  Next Appt: None  Previous Refill Encounter: 9/13/17-90+1    Requested Prescriptions     Pending Prescriptions Disp Refills    clonazePAM (KLONOPIN) 1 mg tablet 90 Tab 1     Sig: Take 1 Tab by mouth daily as needed.

## 2018-08-10 RX ORDER — CLONAZEPAM 1 MG/1
1 TABLET ORAL
Qty: 90 TAB | Refills: 0 | Status: SHIPPED | OUTPATIENT
Start: 2018-08-10 | End: 2019-04-05 | Stop reason: SDUPTHER

## 2018-11-19 ENCOUNTER — HOSPITAL ENCOUNTER (OUTPATIENT)
Dept: MAMMOGRAPHY | Age: 73
Discharge: HOME | End: 2018-11-19
Attending: OBSTETRICS & GYNECOLOGY
Payer: MEDICARE

## 2018-11-19 DIAGNOSIS — Z12.31 VISIT FOR SCREENING MAMMOGRAM: ICD-10-CM

## 2018-11-19 PROCEDURE — 77067 SCR MAMMO BI INCL CAD: CPT

## 2018-12-03 RX ORDER — MONTELUKAST SODIUM 10 MG/1
TABLET ORAL
Qty: 90 TAB | Refills: 3 | Status: SHIPPED | OUTPATIENT
Start: 2018-12-03 | End: 2019-04-16 | Stop reason: SDUPTHER

## 2018-12-03 RX ORDER — DULOXETIN HYDROCHLORIDE 30 MG/1
CAPSULE, DELAYED RELEASE ORAL
Qty: 90 CAP | Refills: 0 | Status: SHIPPED | OUTPATIENT
Start: 2018-12-03 | End: 2019-04-01 | Stop reason: SDUPTHER

## 2018-12-03 RX ORDER — ATENOLOL 50 MG/1
TABLET ORAL
Qty: 135 TAB | Refills: 0 | Status: SHIPPED | OUTPATIENT
Start: 2018-12-03 | End: 2019-04-01 | Stop reason: SDUPTHER

## 2019-04-01 RX ORDER — DULOXETIN HYDROCHLORIDE 30 MG/1
CAPSULE, DELAYED RELEASE ORAL
Qty: 90 CAP | Refills: 0 | Status: SHIPPED | OUTPATIENT
Start: 2019-04-01 | End: 2019-04-16 | Stop reason: SDUPTHER

## 2019-04-01 RX ORDER — ATENOLOL 50 MG/1
TABLET ORAL
Qty: 135 TAB | Refills: 0 | Status: SHIPPED | OUTPATIENT
Start: 2019-04-01 | End: 2019-04-16 | Stop reason: SDUPTHER

## 2019-04-01 RX ORDER — GEMFIBROZIL 600 MG/1
TABLET, FILM COATED ORAL
Qty: 180 TAB | Refills: 2 | Status: SHIPPED | OUTPATIENT
Start: 2019-04-01 | End: 2019-04-16 | Stop reason: SDUPTHER

## 2019-04-05 DIAGNOSIS — F32.A DEPRESSION, UNSPECIFIED DEPRESSION TYPE: ICD-10-CM

## 2019-04-05 NOTE — TELEPHONE ENCOUNTER
**Pharmacy faxed over request for Clonazepam.       * should be checked     Last fill: 8/10/18- (90 day with 0 refills)  Last appt:  3/12/18  Next appt: 4/16/18      Thank you,   [facility], CPhT

## 2019-04-08 RX ORDER — CLONAZEPAM 1 MG/1
1 TABLET ORAL
Qty: 90 TAB | Refills: 0 | Status: SHIPPED | OUTPATIENT
Start: 2019-04-08 | End: 2019-04-16 | Stop reason: SDUPTHER

## 2019-04-16 ENCOUNTER — OFFICE VISIT (OUTPATIENT)
Dept: FAMILY MEDICINE CLINIC | Age: 74
End: 2019-04-16

## 2019-04-16 VITALS
DIASTOLIC BLOOD PRESSURE: 76 MMHG | OXYGEN SATURATION: 96 % | WEIGHT: 205 LBS | SYSTOLIC BLOOD PRESSURE: 126 MMHG | HEART RATE: 79 BPM | BODY MASS INDEX: 34.16 KG/M2 | RESPIRATION RATE: 20 BRPM | HEIGHT: 65 IN | TEMPERATURE: 98 F

## 2019-04-16 DIAGNOSIS — M25.512 CHRONIC LEFT SHOULDER PAIN: ICD-10-CM

## 2019-04-16 DIAGNOSIS — Z00.00 MEDICARE ANNUAL WELLNESS VISIT, SUBSEQUENT: Primary | ICD-10-CM

## 2019-04-16 DIAGNOSIS — E78.2 MIXED HYPERLIPIDEMIA: ICD-10-CM

## 2019-04-16 DIAGNOSIS — R91.1 PULMONARY NODULE, RIGHT: ICD-10-CM

## 2019-04-16 DIAGNOSIS — F32.A DEPRESSION, UNSPECIFIED DEPRESSION TYPE: ICD-10-CM

## 2019-04-16 DIAGNOSIS — I10 ESSENTIAL HYPERTENSION, BENIGN: ICD-10-CM

## 2019-04-16 DIAGNOSIS — G89.29 CHRONIC LEFT SHOULDER PAIN: ICD-10-CM

## 2019-04-16 DIAGNOSIS — J45.909 ASTHMA, UNSPECIFIED ASTHMA SEVERITY, UNSPECIFIED WHETHER COMPLICATED, UNSPECIFIED WHETHER PERSISTENT: ICD-10-CM

## 2019-04-16 DIAGNOSIS — R73.9 HYPERGLYCEMIA: ICD-10-CM

## 2019-04-16 DIAGNOSIS — Z11.59 ENCOUNTER FOR HEPATITIS C SCREENING TEST FOR LOW RISK PATIENT: ICD-10-CM

## 2019-04-16 DIAGNOSIS — M17.12 PRIMARY OSTEOARTHRITIS OF LEFT KNEE: ICD-10-CM

## 2019-04-16 DIAGNOSIS — Z23 ENCOUNTER FOR IMMUNIZATION: ICD-10-CM

## 2019-04-16 DIAGNOSIS — C67.9 MALIGNANT NEOPLASM OF URINARY BLADDER, UNSPECIFIED SITE (HCC): ICD-10-CM

## 2019-04-16 LAB
BILIRUB UR QL STRIP: NEGATIVE
GLUCOSE UR-MCNC: NEGATIVE MG/DL
HBA1C MFR BLD HPLC: 6.3 %
KETONES P FAST UR STRIP-MCNC: NEGATIVE MG/DL
PH UR STRIP: 5 [PH] (ref 4.6–8)
PROT UR QL STRIP: NEGATIVE
SP GR UR STRIP: 1.03 (ref 1–1.03)
UA UROBILINOGEN AMB POC: NORMAL (ref 0.2–1)
URINALYSIS CLARITY POC: CLEAR
URINALYSIS COLOR POC: YELLOW
URINE BLOOD POC: NORMAL
URINE LEUKOCYTES POC: NORMAL
URINE NITRITES POC: NEGATIVE

## 2019-04-16 RX ORDER — FLUTICASONE PROPIONATE AND SALMETEROL 250; 50 UG/1; UG/1
1 POWDER RESPIRATORY (INHALATION) EVERY 12 HOURS
Qty: 3 INHALER | Refills: 3 | Status: SHIPPED | OUTPATIENT
Start: 2019-04-16 | End: 2019-12-16 | Stop reason: RX

## 2019-04-16 RX ORDER — GEMFIBROZIL 600 MG/1
600 TABLET, FILM COATED ORAL 2 TIMES DAILY
Qty: 180 TAB | Refills: 2 | Status: SHIPPED | OUTPATIENT
Start: 2019-04-16 | End: 2019-12-05 | Stop reason: SDUPTHER

## 2019-04-16 RX ORDER — DULOXETIN HYDROCHLORIDE 30 MG/1
30 CAPSULE, DELAYED RELEASE ORAL DAILY
Qty: 90 CAP | Refills: 3 | Status: SHIPPED | OUTPATIENT
Start: 2019-04-16 | End: 2019-12-05 | Stop reason: SDUPTHER

## 2019-04-16 RX ORDER — MONTELUKAST SODIUM 10 MG/1
10 TABLET ORAL DAILY
Qty: 90 TAB | Refills: 3 | Status: SHIPPED | OUTPATIENT
Start: 2019-04-16 | End: 2019-12-05 | Stop reason: SDUPTHER

## 2019-04-16 RX ORDER — ATENOLOL 50 MG/1
50 TABLET ORAL DAILY
Qty: 135 TAB | Refills: 3 | Status: SHIPPED | OUTPATIENT
Start: 2019-04-16 | End: 2020-03-24

## 2019-04-16 RX ORDER — CLONAZEPAM 1 MG/1
1 TABLET ORAL
Qty: 90 TAB | Refills: 3 | Status: SHIPPED | OUTPATIENT
Start: 2019-04-16 | End: 2021-02-23

## 2019-04-16 RX ORDER — MELOXICAM 15 MG/1
15 TABLET ORAL DAILY
Qty: 30 TAB | Refills: 1 | Status: SHIPPED | OUTPATIENT
Start: 2019-04-16 | End: 2019-06-22 | Stop reason: SDUPTHER

## 2019-04-16 NOTE — PROGRESS NOTES
This is the Subsequent Medicare Annual Wellness Exam, performed 12 months or more after the Initial AWV or the last Subsequent AWV I have reviewed the patient's medical history in detail and updated the computerized patient record. History Past Medical History:  
Diagnosis Date  Allergic rhinitis, cause unspecified 1/17/2011  Asthma  Asthma  Bladder cancer ([facility] 7/7/2010  Chronic airway obstruction, not elsewhere classified 1/17/2011  Depression 1/17/2011  Diverticulosis 7/7/2010  Encounter for long-term (current) use of other medications 1/17/2011  Essential hypertension, benign 11/30/2012  Hypercholesterolemia  Mixed hyperlipidemia 1/17/2011  Sleep apnea CPAP Past Surgical History:  
Procedure Laterality Date  ENDOSCOPY, COLON, DIAGNOSTIC    
 HX CARPAL TUNNEL RELEASE    
 right  HX CHOLECYSTECTOMY  HX CYSTOCELE REPAIR    
 HX HYSTERECTOMY  HX ORTHOPAEDIC    
 left thumb surgery  HX UROLOGICAL    
 resection of bladder tumor; bladder sling  REPAIR OF RECTOCELE Current Outpatient Medications Medication Sig Dispense Refill  clonazePAM (KLONOPIN) 1 mg tablet Take 1 Tab by mouth daily as needed (anxiety). 90 Tab 0  
 DULoxetine (CYMBALTA) 30 mg capsule TAKE 1 CAPSULE BY MOUTH  DAILY 90 Cap 0  
 atenolol (TENORMIN) 50 mg tablet TAKE 1 AND 1/2 TABLETS BY  MOUTH DAILY 135 Tab 0  
 gemfibrozil (LOPID) 600 mg tablet TAKE 1 TABLET BY MOUTH  TWICE A  Tab 2  
 montelukast (SINGULAIR) 10 mg tablet TAKE 1 TABLET BY MOUTH  DAILY 90 Tab 3  
 fluticasone-salmeterol (ADVAIR DISKUS) 250-50 mcg/dose diskus inhaler Take 1 Puff by inhalation every twelve (12) hours. 3 Inhaler 3  
 omega-3 fatty acids-vitamin e (FISH OIL) 1,000 mg Cap Take 1 Cap by mouth two (2) times a day.  lysine (L-LYSINE) 500 mg Tab tablet Take  by mouth three (3) times daily (with meals).  aspirin 81 mg chewable tablet Take 81 mg by mouth daily.  fexofenadine (ALLEGRA) 180 mg tablet Take 180 mg by mouth daily.  traMADol (ULTRAM) 50 mg tablet Take 1 Tab by mouth every six (6) hours as needed for Pain. Max Daily Amount: 200 mg. (Patient not taking: Reported on 4/16/2019) 30 Tab 0  
 Calcium Carbonate-Vit D3-Min 600 mg calcium- 400 unit tab Take 1 Tab by mouth daily.  albuterol (PROVENTIL, VENTOLIN) 90 mcg/Actuation inhaler Take 2 Puffs by inhalation every six (6) hours as needed. Allergies Allergen Reactions  Ciprofloxacin Unknown (comments)  Hydrocodone Unknown (comments)  Iodine Shortness of Breath  Lithium Unknown (comments)  Pravastatin Myalgia  Simvastatin Unknown (comments)  Wellbutrin [Bupropion Hcl] Unknown (comments) Family History Problem Relation Age of Onset  Diabetes Mother  Heart Disease Mother  Elevated Lipids Mother  Cancer Mother   
     basal cell ca  Hypertension Father  Cancer Father   
     basal cell ca  Cancer Sister   
     breast  
 Breast Cancer Sister 46s Social History Tobacco Use  Smoking status: Never Smoker  Smokeless tobacco: Never Used Substance Use Topics  Alcohol use: No  
  Comment: occ. wine Patient Active Problem List  
Diagnosis Code  Bladder cancer ([facility] C67.9  Diverticulosis K57.90  Chronic airway obstruction, not elsewhere classified J44.9  Allergic rhinitis J30.9  Depression F32.9  Mixed hyperlipidemia E78.2  
 Encounter for long-term (current) use of other medications Z79.899  Essential hypertension, benign I10  
 Esophageal reflux K21.9  Colon polyps K63.5  Other chest pain R07.89  Simple chronic bronchitis ([facility] J41.0  Pulmonary nodule, right R91.1  Cystitis N30.90  Recurrent depression ([facility] F33.9 Depression Risk Factor Screening:  
 
3 most recent PHQ Screens 10/26/2017 Little interest or pleasure in doing things Not at all Feeling down, depressed, irritable, or hopeless Not at all Total Score PHQ 2 0 Alcohol Risk Factor Screening: You do not drink alcohol or very rarely. Functional Ability and Level of Safety:  
Hearing Loss Hearing is good. Activities of Daily Living The home contains: grab bars and smoke alarm Patient does total self care Fall Risk Fall Risk Assessment, last 12 mths 4/16/2019 Able to walk? Yes Fall in past 12 months? No  
Fall with injury? -  
Number of falls in past 12 months - Fall Risk Score -  
 
 
Abuse Screen Patient is not abused Cognitive Screening Evaluation of Cognitive Function: 
Has your family/caregiver stated any concerns about your memory: no 
Normal 
 
 
Review of Systems - General ROS: negative Ophthalmic ROS: negative Allergy and Immunology ROS: negative Respiratory ROS: no cough, shortness of breath, or wheezing Cardiovascular ROS: no chest pain or dyspnea on exertion Gastrointestinal ROS: no abdominal pain, change in bowel habits, or black or bloody stools Musculoskeletal ROS: positive for - l knee pain,l shoulder pain General appearance - alert, well appearing, and in no distress Mental status - alert, oriented to person, place, and time Eyes - pupils equal and reactive, extraocular eye movements intact Ears - bilateral TM's and external ear canals normal 
Nose - normal and patent, no erythema, discharge or polyps Mouth - mucous membranes mmoist, pharynx normal without lesions Neck - supple, no significant adenopathy Chest - clear to auscultation, no wheezes, rales or rhonchi, symmetric air entry Heart - normal rate, regular rhythm, normal S1, S2, no murmurs, rubs, clicks or gallops Abdomen - soft, nontender, nondistended, no masses or organomegaly Rectal - normal rectal, no masses, guaiac negative stool obtained Musculoskeletal - Lknee swolen ,warm crepitent,L Shoulderlimited ROM,RC maneuvers were suggestive Extremities - peripheral pulses normal, no pedal edema, no clubbing or cyanosis Skin - normal coloration and turgor, no rashes, no suspicious skin lesions noted Patient Care Team  
Patient Care Team: 
Chester Olivares MD as PCP - General Carmen Howe (Urology) Brenda Valenzuela, MARTÍN as Ambulatory Care Navigator Assessment/Plan Education and counseling provided: 
Are appropriate based on today's review and evaluation Diagnoses and all orders for this visit: 
 
1. Medicare annual wellness visit, subsequent -     METABOLIC PANEL, COMPREHENSIVE 2. Essential hypertension, benign -     METABOLIC PANEL, COMPREHENSIVE 
-     CBC WITH AUTOMATED DIFF 
-     AMB POC URINALYSIS DIP STICK AUTO W/O MICRO 3. Mixed hyperlipidemia -     LIPID PANEL 4. Malignant neoplasm of urinary bladder, unspecified site ([facility] 5. Hyperglycemia -     AMB POC HEMOGLOBIN A1C 
 
6. Encounter for hepatitis C screening test for low risk patient 7. Pulmonary nodule, right,for CT 
 
8. Asthma, unspecified asthma severity, unspecified whether complicated, unspecified whether persistent 
-     fluticasone propion-salmeterol (ADVAIR DISKUS) 250-50 mcg/dose diskus inhaler; Take 1 Puff by inhalation every twelve (12) hours. 9. Depression, unspecified depression type 
-     clonazePAM (KLONOPIN) 1 mg tablet; Take 1 Tab by mouth daily as needed (anxiety). Other orders -     DULoxetine (CYMBALTA) 30 mg capsule; Take 1 Cap by mouth daily. -     atenolol (TENORMIN) 50 mg tablet; Take 1 Tab by mouth daily. 
-     gemfibrozil (LOPID) 600 mg tablet; Take 1 Tab by mouth two (2) times a day. -     montelukast (SINGULAIR) 10 mg tablet 
-     HEPATITIS C AB Health Maintenance Due Topic Date Due  
 Hepatitis C Screening  1945  GLAUCOMA SCREENING Q2Y  03/21/2010  COLONOSCOPY  06/12/2017  Pneumococcal 65+ years (2 of 2 - PCV13) 08/02/2018  MEDICARE YEARLY EXAM  08/03/2018

## 2019-04-16 NOTE — PROGRESS NOTES
Chief Complaint Patient presents with  Well Woman Pt getting annual medicare wellness exam.  
 Immunization/Injection Pt getting Prevnar 23 injection. 1. Have you been to the ER, urgent care clinic since your last visit? Hospitalized since your last visit? No 
 
2. Have you seen or consulted any other health care providers outside of the [address] [place] [address] since your last visit? Include any pap smears or colon screening.  No

## 2019-04-16 NOTE — PATIENT INSTRUCTIONS
Rotator Cuff: Exercises Your Care Instructions Here are some examples of typical rehabilitation exercises for your condition. Start each exercise slowly. Ease off the exercise if you start to have pain. Your doctor or physical therapist will tell you when you can start these exercises and which ones will work best for you. How to do the exercises Pendulum swing 1. Hold on to a table or the back of a chair with your good arm. Then bend forward a little and let your sore arm hang straight down. This exercise does not use the arm muscles. Rather, use your legs and your hips to create movement that makes your arm swing freely. 2. Use the movement from your hips and legs to guide the slightly swinging arm back and forth like a pendulum (or elephant trunk). Then guide it in circles that start small (about the size of a dinner plate). Make the circles a bit larger each day, as your pain allows. 3. Do this exercise for 5 minutes, 5 to 7 times each day. 4. As you have less pain, try bending over a little farther to do this exercise. This will increase the amount of movement at your shoulder. Posterior stretching exercise 1. Hold the elbow of your injured arm with your other hand. 2. Use your hand to pull your injured arm gently up and across your body. You will feel a gentle stretch across the back of your injured shoulder. 3. Hold for at least 15 to 30 seconds. Then slowly lower your arm. 4. Repeat 2 to 4 times. Up-the-back stretch 1. Put your hand in your back pocket. Let it rest there to stretch your shoulder. 2. With your other hand, hold your injured arm (palm outward) behind your back by the wrist. Pull your arm up gently to stretch your shoulder. 3. Next, put a towel over your other shoulder. Put the hand of your injured arm behind your back. Now hold the back end of the towel. With the other hand, hold the front end of the towel in front of your body.  Pull gently on the front end of the towel. This will bring your hand farther up your back to stretch your shoulder. Overhead stretch 1. Standing about an arm's length away, grasp onto a solid surface. You could use a countertop, a doorknob, or the back of a sturdy chair. 2. With your knees slightly bent, bend forward with your arms straight. Lower your upper body, and let your shoulders stretch. 3. As your shoulders are able to stretch farther, you may need to take a step or two backward. 4. Hold for at least 15 to 30 seconds. Then stand up and relax. If you had stepped back during your stretch, step forward so you can keep your hands on the solid surface. 5. Repeat 2 to 4 times. Shoulder flexion (lying down) 1. Lie on your back, holding a wand with both hands. Your palms should face down as you hold the wand. 2. Keeping your elbows straight, slowly raise your arms over your head. Raise them until you feel a stretch in your shoulders, upper back, and chest. 
3. Hold for 15 to 30 seconds. 4. Repeat 2 to 4 times. Shoulder rotation (lying down) 1. Lie on your back. Hold a wand with both hands with your elbows bent and palms up. 2. Keep your elbows close to your body, and move the wand across your body toward the sore arm. 3. Hold for 8 to 12 seconds. 4. Repeat 2 to 4 times. Wall climbing (to the side) 1. Stand with your side to a wall so that your fingers can just touch it at an angle about 30 degrees toward the front of your body. 2. Walk the fingers of your injured arm up the wall as high as pain permits. Try not to shrug your shoulder up toward your ear as you move your arm up. 3. Hold that position for a count of at least 15 to 20. 
4. Walk your fingers back down to the starting position. 5. Repeat at least 2 to 4 times. Try to reach higher each time. Wall climbing (to the front) 1. Face a wall, and stand so your fingers can just touch it. 2. Keeping your shoulder down, walk the fingers of your injured arm up the wall as high as pain permits. (Don't shrug your shoulder up toward your ear.) 3. Hold your arm in that position for at least 15 to 30 seconds. 4. Slowly walk your fingers back down to where you started. 5. Repeat at least 2 to 4 times. Try to reach higher each time. Shoulder blade squeeze 1. Stand with your arms at your sides, and squeeze your shoulder blades together. Do not raise your shoulders up as you squeeze. 2. Hold 6 seconds. 3. Repeat 8 to 12 times. Scapular exercise: Arm reach 1. Lie flat on your back. This exercise is a very slight motion that starts with your arms raised (elbows straight, arms straight). 2. From this position, reach higher toward the javier or ceiling. Keep your elbows straight. All motion should be from your shoulder blade only. 3. Relax your arms back to where you started. 4. Repeat 8 to 12 times. Arm raise to the side 1. Slowly raise your injured arm to the side, with your thumb facing up. Raise your arm 60 degrees at the most (shoulder level is 90 degrees). 2. Hold the position for 3 to 5 seconds. Then lower your arm back to your side. If you need to, bring your \"good\" arm across your body and place it under the elbow as you lower your injured arm. Use your good arm to keep your injured arm from dropping down too fast. 
3. Repeat 8 to 12 times. 4. When you first start out, don't hold any extra weight in your hand. As you get stronger, you may use a 1-pound to 2-pound dumbbell or a small can of food. Shoulder flexor and extensor exercise 1. Push forward (flex): Stand facing a wall or doorjamb, about 6 inches or less back. Hold your injured arm against your body. Make a closed fist with your thumb on top. Then gently push your hand forward into the wall with about 25% to 50% of your strength.  Don't let your body move backward as you push. Hold for about 6 seconds. Relax for a few seconds. Repeat 8 to 12 times. 2. Push backward (extend): Stand with your back flat against a wall. Your upper arm should be against the wall, with your elbow bent 90 degrees (your hand straight ahead). Push your elbow gently back against the wall with about 25% to 50% of your strength. Don't let your body move forward as you push. Hold for about 6 seconds. Relax for a few seconds. Repeat 8 to 12 times. Scapular exercise: Wall push-ups 1. Stand facing a wall, about 12 inches to 18 inches away. 2. Place your hands on the wall at shoulder height. 3. Slowly bend your elbows and bring your face to the wall. Keep your back and hips straight. 4. Push back to where you started. 5. Repeat 8 to 12 times. 6. When you can do this exercise against a wall comfortably, you can try it against a counter. You can then slowly progress to the end of a couch, then to a sturdy chair, and finally to the floor. Scapular exercise: Retraction 1. Put the band around a solid object at about waist level. (A bedpost will work well.) Each hand should hold an end of the band. 2. With your elbows at your sides and bent to 90 degrees, pull the band back. Your shoulder blades should move toward each other. Then move your arms back where you started. 3. Repeat 8 to 12 times. 4. If you have good range of motion in your shoulders, try this exercise with your arms lifted out to the sides. Keep your elbows at a 90-degree angle. Raise the elastic band up to about shoulder level. Pull the band back to move your shoulder blades toward each other. Then move your arms back where you started. Internal rotator strengthening exercise 1. Start by tying a piece of elastic exercise material to a doorknob. You can use surgical tubing or Thera-Band. 2. Stand or sit with your shoulder relaxed and your elbow bent 90 degrees. Your upper arm should rest comfortably against your side. Squeeze a rolled towel between your elbow and your body for comfort. This will help keep your arm at your side. 3. Hold one end of the elastic band in the hand of the painful arm. 4. Slowly rotate your forearm toward your body until it touches your belly. Slowly move it back to where you started. 5. Keep your elbow and upper arm firmly tucked against the towel roll or at your side. 6. Repeat 8 to 12 times. External rotator strengthening exercise 1. Start by tying a piece of elastic exercise material to a doorknob. You can use surgical tubing or Thera-Band. (You may also hold one end of the band in each hand.) 2. Stand or sit with your shoulder relaxed and your elbow bent 90 degrees. Your upper arm should rest comfortably against your side. Squeeze a rolled towel between your elbow and your body for comfort. This will help keep your arm at your side. 3. Hold one end of the elastic band with the hand of the painful arm. 4. Start with your forearm across your belly. Slowly rotate the forearm out away from your body. Keep your elbow and upper arm tucked against the towel roll or the side of your body until you begin to feel tightness in your shoulder. Slowly move your arm back to where you started. 5. Repeat 8 to 12 times. Follow-up care is a key part of your treatment and safety. Be sure to make and go to all appointments, and call your doctor if you are having problems. It's also a good idea to know your test results and keep a list of the medicines you take. Where can you learn more? Go to http://nathaniel-abbey.info/. Enter [place] in the search box to learn more about \"Rotator Cuff: Exercises. \" Current as of: September 20, 2018 Content Version: 11.9 © 5063-5975 [employer].  Care instructions adapted under license by [employer] (which disclaims liability or warranty for this information). If you have questions about a medical condition or this instruction, always ask your healthcare professional. [place] [address] any warranty or liability for your use of this information.

## 2019-04-17 LAB
ALBUMIN SERPL-MCNC: 4.5 G/DL (ref 3.5–4.8)
ALBUMIN/GLOB SERPL: 1.9 {RATIO} (ref 1.2–2.2)
ALP SERPL-CCNC: 67 IU/L (ref 39–117)
ALT SERPL-CCNC: 23 IU/L (ref 0–32)
AST SERPL-CCNC: 23 IU/L (ref 0–40)
BASOPHILS # BLD AUTO: 0 X10E3/UL (ref 0–0.2)
BASOPHILS NFR BLD AUTO: 0 %
BILIRUB SERPL-MCNC: 0.6 MG/DL (ref 0–1.2)
BUN SERPL-MCNC: 18 MG/DL (ref 8–27)
BUN/CREAT SERPL: 31 (ref 12–28)
CALCIUM SERPL-MCNC: 9.4 MG/DL (ref 8.7–10.3)
CHLORIDE SERPL-SCNC: 102 MMOL/L (ref 96–106)
CHOLEST SERPL-MCNC: 209 MG/DL (ref 100–199)
CO2 SERPL-SCNC: 21 MMOL/L (ref 20–29)
CREAT SERPL-MCNC: 0.59 MG/DL (ref 0.57–1)
EOSINOPHIL # BLD AUTO: 0 X10E3/UL (ref 0–0.4)
EOSINOPHIL NFR BLD AUTO: 0 %
ERYTHROCYTE [DISTWIDTH] IN BLOOD BY AUTOMATED COUNT: 14.2 % (ref 12.3–15.4)
GLOBULIN SER CALC-MCNC: 2.4 G/DL (ref 1.5–4.5)
GLUCOSE SERPL-MCNC: 174 MG/DL (ref 65–99)
HCT VFR BLD AUTO: 41 % (ref 34–46.6)
HCV AB S/CO SERPL IA: <0.1 S/CO RATIO (ref 0–0.9)
HDLC SERPL-MCNC: 29 MG/DL
HGB BLD-MCNC: 14.5 G/DL (ref 11.1–15.9)
IMM GRANULOCYTES # BLD AUTO: 0.1 X10E3/UL (ref 0–0.1)
IMM GRANULOCYTES NFR BLD AUTO: 2 %
INTERPRETATION, 910389: NORMAL
LDLC SERPL CALC-MCNC: 114 MG/DL (ref 0–99)
LYMPHOCYTES # BLD AUTO: 1.8 X10E3/UL (ref 0.7–3.1)
LYMPHOCYTES NFR BLD AUTO: 52 %
MCH RBC QN AUTO: 30.7 PG (ref 26.6–33)
MCHC RBC AUTO-ENTMCNC: 35.4 G/DL (ref 31.5–35.7)
MCV RBC AUTO: 87 FL (ref 79–97)
MONOCYTES # BLD AUTO: 0.3 X10E3/UL (ref 0.1–0.9)
MONOCYTES NFR BLD AUTO: 10 %
MORPHOLOGY BLD-IMP: ABNORMAL
NEUTROPHILS # BLD AUTO: 1.2 X10E3/UL (ref 1.4–7)
NEUTROPHILS NFR BLD AUTO: 36 %
PLATELET # BLD AUTO: 102 X10E3/UL (ref 150–379)
POTASSIUM SERPL-SCNC: 4.2 MMOL/L (ref 3.5–5.2)
PROT SERPL-MCNC: 6.9 G/DL (ref 6–8.5)
RBC # BLD AUTO: 4.72 X10E6/UL (ref 3.77–5.28)
SODIUM SERPL-SCNC: 141 MMOL/L (ref 134–144)
TRIGL SERPL-MCNC: 328 MG/DL (ref 0–149)
VLDLC SERPL CALC-MCNC: 66 MG/DL (ref 5–40)
WBC # BLD AUTO: 3.4 X10E3/UL (ref 3.4–10.8)

## 2019-04-22 ENCOUNTER — HOSPITAL ENCOUNTER (OUTPATIENT)
Dept: CT IMAGING | Age: 74
Discharge: HOME | End: 2019-04-22
Attending: FAMILY MEDICINE
Payer: MEDICARE

## 2019-04-22 DIAGNOSIS — R91.1 PULMONARY NODULE, RIGHT: ICD-10-CM

## 2019-04-22 PROCEDURE — 71250 CT THORAX DX C-: CPT

## 2019-06-22 DIAGNOSIS — M17.12 PRIMARY OSTEOARTHRITIS OF LEFT KNEE: ICD-10-CM

## 2019-06-25 RX ORDER — MELOXICAM 15 MG/1
TABLET ORAL
Qty: 60 TAB | Refills: 2 | Status: SHIPPED | OUTPATIENT
Start: 2019-06-25 | End: 2019-12-05 | Stop reason: SDUPTHER

## 2019-11-20 ENCOUNTER — HOSPITAL ENCOUNTER (OUTPATIENT)
Dept: MAMMOGRAPHY | Age: 74
Discharge: HOME | End: 2019-11-20
Attending: OBSTETRICS & GYNECOLOGY
Payer: MEDICARE

## 2019-11-20 DIAGNOSIS — Z12.31 VISIT FOR SCREENING MAMMOGRAM: ICD-10-CM

## 2019-11-20 PROCEDURE — 77063 BREAST TOMOSYNTHESIS BI: CPT

## 2019-12-05 DIAGNOSIS — M17.12 PRIMARY OSTEOARTHRITIS OF LEFT KNEE: ICD-10-CM

## 2019-12-06 RX ORDER — DULOXETIN HYDROCHLORIDE 30 MG/1
CAPSULE, DELAYED RELEASE ORAL
Qty: 90 CAP | Refills: 3 | Status: SHIPPED | OUTPATIENT
Start: 2019-12-06 | End: 2021-02-23 | Stop reason: SDUPTHER

## 2019-12-06 RX ORDER — GEMFIBROZIL 600 MG/1
TABLET, FILM COATED ORAL
Qty: 180 TAB | Refills: 1 | Status: SHIPPED | OUTPATIENT
Start: 2019-12-06 | End: 2020-05-21

## 2019-12-06 RX ORDER — MONTELUKAST SODIUM 10 MG/1
TABLET ORAL
Qty: 90 TAB | Refills: 3 | Status: SHIPPED | OUTPATIENT
Start: 2019-12-06 | End: 2021-03-21

## 2019-12-06 RX ORDER — MELOXICAM 15 MG/1
TABLET ORAL
Qty: 90 TAB | Refills: 0 | Status: SHIPPED | OUTPATIENT
Start: 2019-12-06 | End: 2020-06-24 | Stop reason: ALTCHOICE

## 2019-12-16 DIAGNOSIS — J45.909 ASTHMA, UNSPECIFIED ASTHMA SEVERITY, UNSPECIFIED WHETHER COMPLICATED, UNSPECIFIED WHETHER PERSISTENT: Primary | ICD-10-CM

## 2019-12-16 RX ORDER — BUDESONIDE AND FORMOTEROL FUMARATE DIHYDRATE 160; 4.5 UG/1; UG/1
2 AEROSOL RESPIRATORY (INHALATION) 2 TIMES DAILY
Qty: 3 INHALER | Refills: 3 | Status: SHIPPED | OUTPATIENT
Start: 2019-12-16 | End: 2021-02-23

## 2020-03-24 RX ORDER — ATENOLOL 50 MG/1
TABLET ORAL
Qty: 90 TAB | Refills: 0 | Status: SHIPPED | OUTPATIENT
Start: 2020-03-24 | End: 2020-05-28 | Stop reason: SDUPTHER

## 2020-05-28 RX ORDER — ATENOLOL 50 MG/1
TABLET ORAL
Qty: 90 TAB | Refills: 0 | Status: SHIPPED | OUTPATIENT
Start: 2020-05-28 | End: 2020-06-24 | Stop reason: SDUPTHER

## 2020-05-28 NOTE — TELEPHONE ENCOUNTER
Last visit:4/16/19  Next visit:not scheduled  Previous refill 3/24/20(90+0R) Patient needs to schedule appt for further refills. Requested Prescriptions     Pending Prescriptions Disp Refills    atenoloL (TENORMIN) 50 mg tablet 90 Tab 0     Sig: TAKE 1 TABLET BY MOUTH  DAILY.  (Appointment needs to be made for further refills)

## 2020-06-23 ENCOUNTER — TELEPHONE (OUTPATIENT)
Dept: FAMILY MEDICINE CLINIC | Age: 75
End: 2020-06-23

## 2020-06-23 NOTE — TELEPHONE ENCOUNTER
----- Message from Celsa Vasquez sent at 6/23/2020  2:42 PM EDT -----  Regarding: /Telephone  General Message/Vendor Calls    Caller's first and last name:      Reason for call:  Medication needs to be corrected    Callback required yes/no and why:  Yes, to let her know when a new prescription for \"Atenolol\" is sent in [address]. Also requesting a temporary prescription is sent to [facility] on [address].      Best contact number(s):  [phone]    Details to clarify the request:      Celsa Vasquez

## 2020-06-23 NOTE — TELEPHONE ENCOUNTER
Verified patient with two type of identifiers. Pt states did get prescription for atenolol but is taking 1 1/2 tabs and stated she was told by Dr. Curtis Rodriguez to do so. Pt scheduled for appt per Dr. Curtis Rodriguez request tomorrow. Pt verbalized understanding.

## 2020-06-24 ENCOUNTER — VIRTUAL VISIT (OUTPATIENT)
Dept: FAMILY MEDICINE CLINIC | Age: 75
End: 2020-06-24

## 2020-06-24 DIAGNOSIS — C67.9 MALIGNANT NEOPLASM OF URINARY BLADDER, UNSPECIFIED SITE (HCC): ICD-10-CM

## 2020-06-24 DIAGNOSIS — E78.2 MIXED HYPERLIPIDEMIA: ICD-10-CM

## 2020-06-24 DIAGNOSIS — Z00.00 MEDICARE ANNUAL WELLNESS VISIT, SUBSEQUENT: ICD-10-CM

## 2020-06-24 DIAGNOSIS — G89.29 CHRONIC LEFT SHOULDER PAIN: ICD-10-CM

## 2020-06-24 DIAGNOSIS — M25.512 CHRONIC LEFT SHOULDER PAIN: ICD-10-CM

## 2020-06-24 DIAGNOSIS — R91.1 PULMONARY NODULE, RIGHT: ICD-10-CM

## 2020-06-24 DIAGNOSIS — I10 ESSENTIAL HYPERTENSION, BENIGN: Primary | ICD-10-CM

## 2020-06-24 RX ORDER — MELATONIN
1000 DAILY
COMMUNITY

## 2020-06-24 RX ORDER — ATENOLOL 50 MG/1
TABLET ORAL
Qty: 45 TAB | Refills: 1 | Status: SHIPPED | OUTPATIENT
Start: 2020-06-24 | End: 2020-07-10 | Stop reason: SDUPTHER

## 2020-06-24 RX ORDER — DICLOFENAC SODIUM 75 MG/1
75 TABLET, DELAYED RELEASE ORAL 2 TIMES DAILY WITH MEALS
Qty: 60 TAB | Refills: 1 | Status: SHIPPED | OUTPATIENT
Start: 2020-06-24 | End: 2020-11-10

## 2020-06-24 RX ORDER — ATENOLOL 50 MG/1
TABLET ORAL
Qty: 135 TAB | Refills: 3 | Status: SHIPPED | OUTPATIENT
Start: 2020-06-24 | End: 2020-06-24 | Stop reason: SDUPTHER

## 2020-06-24 NOTE — PROGRESS NOTES
Chief Complaint   Patient presents with    Hypertension       1. Have you been to the ER, urgent care clinic since your last visit? Hospitalized since your last visit? No    2. Have you seen or consulted any other health care providers outside of the [address] [place] [address] since your last visit? Include any pap smears or colon screening.  No    Health Maintenance Due   Topic Date Due    Shingrix Vaccine Age 49> (1 of 2) 03/21/1995    GLAUCOMA SCREENING Q2Y  03/21/2010    Medicare Yearly Exam  04/16/2020

## 2020-06-24 NOTE — PROGRESS NOTES
HISTORY OF PRESENT ILLNESS  Patient encounter by synchronous (real time) audio technology which is patient initiated. The Patient is aware that this encounter is a billable service with coverage determined by their insurance carrier,as discussed at the time of check in. The patient has given verbal consent to proceed    Joanne Frankel is a 76 y.o. female. f/u hbp,chl,copd,ar,depression ,pulmonary nodule. Feeling ok,some lumbago. Using symbicort daily  Hypertension    The history is provided by the patient. This is a chronic problem. The problem has not changed since onset. Associated symptoms include shortness of breath. Pertinent negatives include no chest pain, no orthopnea, no malaise/fatigue, no headaches and no peripheral edema. Cholesterol Problem   This is a chronic problem. The problem occurs daily. The problem has not changed since onset. Associated symptoms include shortness of breath. Pertinent negatives include no chest pain, no abdominal pain and no headaches. Breathing Problem   The history is provided by the patient. This is a chronic problem. The problem has not changed since onset. Pertinent negatives include no headaches, no ear pain, no cough, no sputum production, no orthopnea, no chest pain, no abdominal pain and no rash. Review of Systems   Constitutional: Negative for malaise/fatigue. HENT: Negative for ear pain. Respiratory: Positive for shortness of breath and stridor. Negative for cough and sputum production. Cardiovascular: Negative for chest pain and orthopnea. Gastrointestinal: Negative for abdominal pain. Musculoskeletal: Positive for back pain and joint pain. Skin: Negative for rash. Neurological: Positive for tingling. Negative for headaches. Physical Exam deferred    ASSESSMENT and PLAN  Diagnoses and all orders for this visit:    1. Essential hypertension, benign  -     atenoloL (TENORMIN) 50 mg tablet; TAKE 1.5 TABLET BY MOUTH  DAILY.  (Appointment needs to be made for further refills)  -     METABOLIC PANEL, COMPREHENSIVE  -     CBC WITH AUTOMATED DIFF    2. Mixed hyperlipidemia  -     LIPID PANEL    3. Malignant neoplasm of urinary bladder, unspecified site ([facility]    4. Chronic left shoulder pain  -     diclofenac EC (VOLTAREN) 75 mg EC tablet; Take 1 Tab by mouth two (2) times daily (with meals). 5. Medicare annual wellness visit, subsequent    6. Pulmonary nodule, right  -     CT CHEST WO CONT; Future       Due to this being a telehealth encounter (During  [address] [facility]),evaluation of the following organ systems was limited:Vitals/Constitutional/EENT,Respiratory,CV,GI,,MS,Neuro,Skin /Heme-Lymph-Imm  Pursuant to the emergency declaration under the [address] [facility]  and [place] [address] waiver authority and the [employer] and [facility] Appropriations Act,this Virtual Visit was conducted ,with patient consent,to reduce the patients risk of exposure to Covid 19 and to provide continuity of care  for an established patient. Services were provided through a video synchronous discussion virtually to substitute for in person appointment. This visit was completed using Doxy. me    Time in Virtual Visit: 15   minutes

## 2020-06-25 ENCOUNTER — HOSPITAL ENCOUNTER (OUTPATIENT)
Dept: LAB | Age: 75
Discharge: HOME | End: 2020-06-25
Payer: MEDICARE

## 2020-06-25 PROCEDURE — 80061 LIPID PANEL: CPT

## 2020-06-25 PROCEDURE — 85025 COMPLETE CBC W/AUTO DIFF WBC: CPT

## 2020-06-25 PROCEDURE — 80053 COMPREHEN METABOLIC PANEL: CPT

## 2020-06-26 LAB
ALBUMIN SERPL-MCNC: 4.8 G/DL (ref 3.7–4.7)
ALBUMIN/GLOB SERPL: 1.9 {RATIO} (ref 1.2–2.2)
ALP SERPL-CCNC: 63 IU/L (ref 39–117)
ALT SERPL-CCNC: 16 IU/L (ref 0–32)
AST SERPL-CCNC: 17 IU/L (ref 0–40)
BASOPHILS # BLD AUTO: 0 X10E3/UL (ref 0–0.2)
BASOPHILS NFR BLD AUTO: 0 %
BILIRUB SERPL-MCNC: 0.4 MG/DL (ref 0–1.2)
BUN SERPL-MCNC: 18 MG/DL (ref 8–27)
BUN/CREAT SERPL: 27 (ref 12–28)
CALCIUM SERPL-MCNC: 9.6 MG/DL (ref 8.7–10.3)
CHLORIDE SERPL-SCNC: 102 MMOL/L (ref 96–106)
CHOLEST SERPL-MCNC: 218 MG/DL (ref 100–199)
CO2 SERPL-SCNC: 25 MMOL/L (ref 20–29)
CREAT SERPL-MCNC: 0.67 MG/DL (ref 0.57–1)
EOSINOPHIL # BLD AUTO: 0 X10E3/UL (ref 0–0.4)
EOSINOPHIL NFR BLD AUTO: 0 %
ERYTHROCYTE [DISTWIDTH] IN BLOOD BY AUTOMATED COUNT: 13.2 % (ref 11.7–15.4)
GLOBULIN SER CALC-MCNC: 2.5 G/DL (ref 1.5–4.5)
GLUCOSE SERPL-MCNC: 120 MG/DL (ref 65–99)
HCT VFR BLD AUTO: 41.3 % (ref 34–46.6)
HDLC SERPL-MCNC: 34 MG/DL
HGB BLD-MCNC: 14.3 G/DL (ref 11.1–15.9)
IMM GRANULOCYTES # BLD AUTO: 0.1 X10E3/UL (ref 0–0.1)
IMM GRANULOCYTES NFR BLD AUTO: 2 %
INTERPRETATION, 910389: NORMAL
LDLC SERPL CALC-MCNC: 146 MG/DL (ref 0–99)
LYMPHOCYTES # BLD AUTO: 2.4 X10E3/UL (ref 0.7–3.1)
LYMPHOCYTES NFR BLD AUTO: 53 %
MCH RBC QN AUTO: 29.9 PG (ref 26.6–33)
MCHC RBC AUTO-ENTMCNC: 34.6 G/DL (ref 31.5–35.7)
MCV RBC AUTO: 86 FL (ref 79–97)
MONOCYTES # BLD AUTO: 0.5 X10E3/UL (ref 0.1–0.9)
MONOCYTES NFR BLD AUTO: 11 %
MORPHOLOGY BLD-IMP: ABNORMAL
NEUTROPHILS # BLD AUTO: 1.6 X10E3/UL (ref 1.4–7)
NEUTROPHILS NFR BLD AUTO: 34 %
PLATELET # BLD AUTO: 96 X10E3/UL (ref 150–450)
POTASSIUM SERPL-SCNC: 4.7 MMOL/L (ref 3.5–5.2)
PROT SERPL-MCNC: 7.3 G/DL (ref 6–8.5)
RBC # BLD AUTO: 4.79 X10E6/UL (ref 3.77–5.28)
SODIUM SERPL-SCNC: 141 MMOL/L (ref 134–144)
SPECIMEN STATUS REPORT, ROLRST: NORMAL
TRIGL SERPL-MCNC: 192 MG/DL (ref 0–149)
VLDLC SERPL CALC-MCNC: 38 MG/DL (ref 5–40)
WBC # BLD AUTO: 4.6 X10E3/UL (ref 3.4–10.8)

## 2020-07-02 ENCOUNTER — HOSPITAL ENCOUNTER (OUTPATIENT)
Dept: CT IMAGING | Age: 75
Discharge: HOME | End: 2020-07-02
Attending: FAMILY MEDICINE
Payer: MEDICARE

## 2020-07-02 DIAGNOSIS — R91.1 PULMONARY NODULE, RIGHT: ICD-10-CM

## 2020-07-02 PROCEDURE — 71250 CT THORAX DX C-: CPT

## 2020-07-09 ENCOUNTER — TELEPHONE (OUTPATIENT)
Dept: FAMILY MEDICINE CLINIC | Age: 75
End: 2020-07-09

## 2020-07-10 DIAGNOSIS — I10 ESSENTIAL HYPERTENSION, BENIGN: ICD-10-CM

## 2020-07-10 RX ORDER — ATENOLOL 50 MG/1
TABLET ORAL
Qty: 45 TAB | Refills: 3 | Status: SHIPPED | OUTPATIENT
Start: 2020-07-10 | End: 2021-06-28

## 2020-07-28 RX ORDER — GEMFIBROZIL 600 MG/1
TABLET, FILM COATED ORAL
Qty: 180 TAB | Refills: 3 | Status: SHIPPED | OUTPATIENT
Start: 2020-07-28 | End: 2021-10-14

## 2020-11-07 ENCOUNTER — APPOINTMENT (OUTPATIENT)
Dept: CT IMAGING | Age: 75
DRG: 066 | End: 2020-11-07
Attending: EMERGENCY MEDICINE
Payer: MEDICARE

## 2020-11-07 ENCOUNTER — APPOINTMENT (OUTPATIENT)
Dept: MRI IMAGING | Age: 75
DRG: 066 | End: 2020-11-07
Attending: INTERNAL MEDICINE
Payer: MEDICARE

## 2020-11-07 ENCOUNTER — HOSPITAL ENCOUNTER (INPATIENT)
Age: 75
LOS: 2 days | Discharge: HOME | DRG: 066 | End: 2020-11-09
Attending: EMERGENCY MEDICINE | Admitting: INTERNAL MEDICINE
Payer: MEDICARE

## 2020-11-07 DIAGNOSIS — R29.90 STROKE-LIKE SYMPTOM: Primary | ICD-10-CM

## 2020-11-07 PROBLEM — G43.909 MIGRAINE: Status: ACTIVE | Noted: 2020-11-07

## 2020-11-07 PROBLEM — I63.9 STROKE (HCC): Status: ACTIVE | Noted: 2020-11-07

## 2020-11-07 PROBLEM — R56.9 SEIZURE (HCC): Status: ACTIVE | Noted: 2020-11-07

## 2020-11-07 LAB
ALBUMIN SERPL-MCNC: 3.9 G/DL (ref 3.5–5)
ALBUMIN/GLOB SERPL: 1.2 {RATIO} (ref 1.1–2.2)
ALP SERPL-CCNC: 74 U/L (ref 45–117)
ALT SERPL-CCNC: 18 U/L (ref 12–78)
ANION GAP SERPL CALC-SCNC: 4 MMOL/L (ref 5–15)
AST SERPL-CCNC: 13 U/L (ref 15–37)
BASOPHILS # BLD: 0 K/UL (ref 0–0.1)
BASOPHILS NFR BLD: 0 % (ref 0–1)
BILIRUB SERPL-MCNC: 0.4 MG/DL (ref 0.2–1)
BUN SERPL-MCNC: 17 MG/DL (ref 6–20)
BUN/CREAT SERPL: 25 (ref 12–20)
CALCIUM SERPL-MCNC: 9.5 MG/DL (ref 8.5–10.1)
CHLORIDE SERPL-SCNC: 107 MMOL/L (ref 97–108)
CO2 SERPL-SCNC: 27 MMOL/L (ref 21–32)
CREAT SERPL-MCNC: 0.67 MG/DL (ref 0.55–1.02)
DIFFERENTIAL METHOD BLD: ABNORMAL
EOSINOPHIL # BLD: 0 K/UL (ref 0–0.4)
EOSINOPHIL NFR BLD: 0 % (ref 0–7)
ERYTHROCYTE [DISTWIDTH] IN BLOOD BY AUTOMATED COUNT: 13.6 % (ref 11.5–14.5)
GLOBULIN SER CALC-MCNC: 3.3 G/DL (ref 2–4)
GLUCOSE BLD STRIP.AUTO-MCNC: 124 MG/DL (ref 65–100)
GLUCOSE BLD STRIP.AUTO-MCNC: 147 MG/DL (ref 65–100)
GLUCOSE SERPL-MCNC: 117 MG/DL (ref 65–100)
HCT VFR BLD AUTO: 39.2 % (ref 35–47)
HGB BLD-MCNC: 13.2 G/DL (ref 11.5–16)
IMM GRANULOCYTES # BLD AUTO: 0.1 K/UL (ref 0–0.04)
IMM GRANULOCYTES NFR BLD AUTO: 2 % (ref 0–0.5)
INR PPP: 1 (ref 0.9–1.1)
LYMPHOCYTES # BLD: 2.1 K/UL (ref 0.8–3.5)
LYMPHOCYTES NFR BLD: 36 % (ref 12–49)
MAGNESIUM SERPL-MCNC: 2 MG/DL (ref 1.6–2.4)
MCH RBC QN AUTO: 29.8 PG (ref 26–34)
MCHC RBC AUTO-ENTMCNC: 33.7 G/DL (ref 30–36.5)
MCV RBC AUTO: 88.5 FL (ref 80–99)
MONOCYTES # BLD: 0.8 K/UL (ref 0–1)
MONOCYTES NFR BLD: 14 % (ref 5–13)
NEUTS SEG # BLD: 2.8 K/UL (ref 1.8–8)
NEUTS SEG NFR BLD: 48 % (ref 32–75)
NRBC # BLD: 0 K/UL (ref 0–0.01)
NRBC BLD-RTO: 0 PER 100 WBC
PLATELET # BLD AUTO: 96 K/UL (ref 150–400)
PMV BLD AUTO: ABNORMAL FL (ref 8.9–12.9)
POTASSIUM SERPL-SCNC: 4.2 MMOL/L (ref 3.5–5.1)
PROT SERPL-MCNC: 7.2 G/DL (ref 6.4–8.2)
PROTHROMBIN TIME: 10.3 SEC (ref 9–11.1)
RBC # BLD AUTO: 4.43 M/UL (ref 3.8–5.2)
SERVICE CMNT-IMP: ABNORMAL
SERVICE CMNT-IMP: ABNORMAL
SODIUM SERPL-SCNC: 138 MMOL/L (ref 136–145)
WBC # BLD AUTO: 5.9 K/UL (ref 3.6–11)

## 2020-11-07 PROCEDURE — 74011250636 HC RX REV CODE- 250/636: Performed by: INTERNAL MEDICINE

## 2020-11-07 PROCEDURE — 36415 COLL VENOUS BLD VENIPUNCTURE: CPT

## 2020-11-07 PROCEDURE — 74011250636 HC RX REV CODE- 250/636: Performed by: EMERGENCY MEDICINE

## 2020-11-07 PROCEDURE — 74011000250 HC RX REV CODE- 250: Performed by: INTERNAL MEDICINE

## 2020-11-07 PROCEDURE — 93005 ELECTROCARDIOGRAM TRACING: CPT

## 2020-11-07 PROCEDURE — 74011250637 HC RX REV CODE- 250/637: Performed by: INTERNAL MEDICINE

## 2020-11-07 PROCEDURE — 65660000000 HC RM CCU STEPDOWN

## 2020-11-07 PROCEDURE — 70551 MRI BRAIN STEM W/O DYE: CPT

## 2020-11-07 PROCEDURE — 94640 AIRWAY INHALATION TREATMENT: CPT

## 2020-11-07 PROCEDURE — 85610 PROTHROMBIN TIME: CPT

## 2020-11-07 PROCEDURE — 77030038269 HC DRN EXT URIN PURWCK BARD -A

## 2020-11-07 PROCEDURE — 70544 MR ANGIOGRAPHY HEAD W/O DYE: CPT

## 2020-11-07 PROCEDURE — 83735 ASSAY OF MAGNESIUM: CPT

## 2020-11-07 PROCEDURE — 74011250637 HC RX REV CODE- 250/637: Performed by: EMERGENCY MEDICINE

## 2020-11-07 PROCEDURE — 85025 COMPLETE CBC W/AUTO DIFF WBC: CPT

## 2020-11-07 PROCEDURE — 2709999900 HC NON-CHARGEABLE SUPPLY

## 2020-11-07 PROCEDURE — 70450 CT HEAD/BRAIN W/O DYE: CPT

## 2020-11-07 PROCEDURE — 82962 GLUCOSE BLOOD TEST: CPT

## 2020-11-07 PROCEDURE — 80053 COMPREHEN METABOLIC PANEL: CPT

## 2020-11-07 PROCEDURE — 70547 MR ANGIOGRAPHY NECK W/O DYE: CPT

## 2020-11-07 PROCEDURE — 99285 EMERGENCY DEPT VISIT HI MDM: CPT

## 2020-11-07 RX ORDER — MONTELUKAST SODIUM 10 MG/1
10 TABLET ORAL
Status: DISCONTINUED | OUTPATIENT
Start: 2020-11-07 | End: 2020-11-09 | Stop reason: HOSPADM

## 2020-11-07 RX ORDER — ENOXAPARIN SODIUM 100 MG/ML
40 INJECTION SUBCUTANEOUS EVERY 24 HOURS
Status: DISCONTINUED | OUTPATIENT
Start: 2020-11-07 | End: 2020-11-09 | Stop reason: HOSPADM

## 2020-11-07 RX ORDER — MELATONIN
1000 DAILY
Status: DISCONTINUED | OUTPATIENT
Start: 2020-11-08 | End: 2020-11-09 | Stop reason: HOSPADM

## 2020-11-07 RX ORDER — GUAIFENESIN 100 MG/5ML
324 LIQUID (ML) ORAL
Status: COMPLETED | OUTPATIENT
Start: 2020-11-07 | End: 2020-11-07

## 2020-11-07 RX ORDER — ATENOLOL 25 MG/1
25 TABLET ORAL DAILY
Status: DISCONTINUED | OUTPATIENT
Start: 2020-11-08 | End: 2020-11-09 | Stop reason: HOSPADM

## 2020-11-07 RX ORDER — LORAZEPAM 2 MG/ML
1 INJECTION INTRAMUSCULAR
Status: DISCONTINUED | OUTPATIENT
Start: 2020-11-07 | End: 2020-11-09 | Stop reason: HOSPADM

## 2020-11-07 RX ORDER — GUAIFENESIN 100 MG/5ML
81 LIQUID (ML) ORAL DAILY
Status: DISCONTINUED | OUTPATIENT
Start: 2020-11-08 | End: 2020-11-09 | Stop reason: HOSPADM

## 2020-11-07 RX ORDER — DULOXETIN HYDROCHLORIDE 30 MG/1
30 CAPSULE, DELAYED RELEASE ORAL DAILY
Status: DISCONTINUED | OUTPATIENT
Start: 2020-11-08 | End: 2020-11-09 | Stop reason: HOSPADM

## 2020-11-07 RX ORDER — ACETAMINOPHEN 650 MG/1
650 SUPPOSITORY RECTAL
Status: DISCONTINUED | OUTPATIENT
Start: 2020-11-07 | End: 2020-11-09 | Stop reason: HOSPADM

## 2020-11-07 RX ORDER — ATENOLOL 50 MG/1
50 TABLET ORAL DAILY
Status: DISCONTINUED | OUTPATIENT
Start: 2020-11-08 | End: 2020-11-07

## 2020-11-07 RX ORDER — GUAIFENESIN 100 MG/5ML
81 LIQUID (ML) ORAL DAILY
Status: DISCONTINUED | OUTPATIENT
Start: 2020-11-08 | End: 2020-11-07

## 2020-11-07 RX ORDER — CLOPIDOGREL BISULFATE 75 MG/1
300 TABLET ORAL ONCE
Status: COMPLETED | OUTPATIENT
Start: 2020-11-07 | End: 2020-11-07

## 2020-11-07 RX ORDER — CLONAZEPAM 0.5 MG/1
1 TABLET ORAL
Status: DISCONTINUED | OUTPATIENT
Start: 2020-11-07 | End: 2020-11-09 | Stop reason: HOSPADM

## 2020-11-07 RX ORDER — GEMFIBROZIL 600 MG/1
600 TABLET, FILM COATED ORAL 2 TIMES DAILY
Status: DISCONTINUED | OUTPATIENT
Start: 2020-11-07 | End: 2020-11-09 | Stop reason: HOSPADM

## 2020-11-07 RX ORDER — ACETAMINOPHEN 325 MG/1
650 TABLET ORAL
Status: DISCONTINUED | OUTPATIENT
Start: 2020-11-07 | End: 2020-11-09 | Stop reason: HOSPADM

## 2020-11-07 RX ADMIN — ASPIRIN 81 MG CHEWABLE TABLET 324 MG: 81 TABLET CHEWABLE at 12:01

## 2020-11-07 RX ADMIN — ARFORMOTEROL TARTRATE: 15 SOLUTION RESPIRATORY (INHALATION) at 20:38

## 2020-11-07 RX ADMIN — SODIUM CHLORIDE 1000 ML: 900 INJECTION, SOLUTION INTRAVENOUS at 11:54

## 2020-11-07 RX ADMIN — ENOXAPARIN SODIUM 40 MG: 40 INJECTION SUBCUTANEOUS at 17:57

## 2020-11-07 RX ADMIN — MONTELUKAST 10 MG: 10 TABLET, FILM COATED ORAL at 21:13

## 2020-11-07 RX ADMIN — CLOPIDOGREL BISULFATE 300 MG: 75 TABLET ORAL at 12:02

## 2020-11-07 RX ADMIN — GEMFIBROZIL 600 MG: 600 TABLET ORAL at 21:13

## 2020-11-07 NOTE — ED NOTES
Pt had the sensation of the tingling and heat starts on left side lips and goes across lips which patient reports is precursor to the \"TIA\" but she didn't go into full symptoms

## 2020-11-07 NOTE — H&P
Hospitalist Admission Note    NAME: Lina Gibson   :  1945   MRN:  [id number]     Date/Time:  2020 3:09 PM    Patient PCP: Aaron De La Torre MD  ______________________________________________________________________  Given the patient's current clinical presentation, I have a high level of concern for decompensation if discharged from the emergency department. Complex decision making was performed, which includes reviewing the patient's available past medical records, laboratory results, and x-ray films. My assessment of this patient's clinical condition and my plan of care is as follows. Assessment / Plan:  Unsteady gait associated with intermittent floppy right upper extremity concerning for TIAs versus seizure activity versus ocular migraine  We will admit to neuro floor, neurochecks, will check MRI of the brain and MRA of brain and neck, 2D echo, EEG and neuro consult  Check HbA1c and lipid profile  Seizure precaution and as needed Ativan  As needed Tylenol  Continue aspirin and gemfibrozil    Hypertension  Depression  Asthma  Continue with atenolol with holding parameters  Continue with Cymbalta  Continue with montelukast    Code Status: Full code  Surrogate Decision Maker: Her     DVT Prophylaxis: Lovenox  GI Prophylaxis: not indicated    Baseline: Ambulatory      Subjective:   CHIEF COMPLAINT: Unsteady gait and floppy right upper extremity    HISTORY OF PRESENT ILLNESS:     Rosana Lewis is a 76 y.o.  female with past medical history of asthma, depression, hypertension, hyperlipidemia, RYAN on CPAP who presents with sudden onset of unsteady gait this morning associated with impression of floppy right arm which she could not control. She reported before her episode of floppy right arm she was feeling numbness across her mouth, she also reported slurred speech. patient had a total of 5 episodes of the symptoms today.   2 of those episodes were witnessed in the ED .during those episodes she will hold her breath and and not able to breathe . patient reported that last week he had during the whole week episode of ocular migraines which starts by blurry vision and seeing discs which lasts about 5 minutes. she saw her primary care physician who qualify those episodes as ocular migraine. She denies any headaches, nausea or vomiting, focal deficit  In the ED, her vital signs showed that she was slightly bradycardic, soft blood pressure  Her labs are unremarkable  Was evaluated by telemetry neurologist who did not retain any indication for TPA because of the intermittent symptoms   We were asked to admit for work up and evaluation of the above problems.      Past Medical History:   Diagnosis Date    Allergic rhinitis, cause unspecified 1/17/2011    Asthma     Asthma     Bladder cancer ([facility] [address] 7/7/2010    Chronic airway obstruction, not elsewhere classified 1/17/2011    Depression 1/17/2011    Diverticulosis 7/7/2010    Encounter for long-term (current) use of other medications 1/17/2011    Essential hypertension, benign 11/30/2012    Hypercholesterolemia     Mixed hyperlipidemia 1/17/2011    Sleep apnea     CPAP        Past Surgical History:   Procedure Laterality Date    ENDOSCOPY, COLON, DIAGNOSTIC      HX CARPAL TUNNEL RELEASE      right    HX CHOLECYSTECTOMY      HX CYSTOCELE REPAIR      HX HYSTERECTOMY      HX ORTHOPAEDIC      left thumb surgery    HX UROLOGICAL      resection of bladder tumor; bladder sling    REPAIR OF RECTOCELE         Social History     Tobacco Use    Smoking status: Never Smoker    Smokeless tobacco: Never Used   Substance Use Topics    Alcohol use: No     Comment: occ. wine        Family History   Problem Relation Age of Onset    Diabetes Mother     Heart Disease Mother     Elevated Lipids Mother     Cancer Mother         basal cell ca    Hypertension Father     Cancer Father         basal cell ca    Cancer Sister breast    Breast Cancer Sister         46s     Allergies   Allergen Reactions    Ciprofloxacin Unknown (comments)    Hydrocodone Unknown (comments)    Iodine Shortness of Breath    Lithium Unknown (comments)    Pravastatin Myalgia    Simvastatin Unknown (comments)    Wellbutrin [Bupropion Hcl] Unknown (comments)        Prior to Admission medications    Medication Sig Start Date End Date Taking? Authorizing Provider   gemfibroziL (LOPID) 600 mg tablet TAKE 1 TABLET BY MOUTH  TWICE DAILY 7/28/20  Yes Johnie Rosales MD   atenoloL (TENORMIN) 50 mg tablet TAKE 1.5 TABLET BY MOUTH  DAILY. (Appointment needs to be made for further refills) 7/10/20  Yes Johnie Rosales MD   cholecalciferol (Vitamin D3) (1000 Units /25 mcg) tablet Take 1,000 Units by mouth daily. Yes Provider, Historical   budesonide-formoterol (SYMBICORT) 160-4.5 mcg/actuation HFAA Take 2 Puffs by inhalation two (2) times a day. 12/16/19  Yes Johnie Rosales MD   DULoxetine (CYMBALTA) 30 mg capsule TAKE 1 CAPSULE BY MOUTH  DAILY 12/6/19  Yes Johnie Rosales MD   montelukast (SINGULAIR) 10 mg tablet TAKE 1 TABLET BY MOUTH  DAILY 12/6/19  Yes Johnie Rosales MD   clonazePAM (KLONOPIN) 1 mg tablet Take 1 Tab by mouth daily as needed (anxiety). 4/16/19  Yes Johnie Rosales MD   omega-3 fatty acids-vitamin e (Fish Oil) 1,000 mg cap Take 1 Cap by mouth two (2) times a day. Yes Provider, Historical   lysine (L-LYSINE) 500 mg Tab tablet Take 500 mg by mouth as needed. Yes Provider, Historical   aspirin 81 mg chewable tablet Take 81 mg by mouth daily. Yes Provider, Historical   fexofenadine (ALLEGRA) 180 mg tablet Take 180 mg by mouth daily. Yes Provider, Historical   diclofenac EC (VOLTAREN) 75 mg EC tablet Take 1 Tab by mouth two (2) times daily (with meals). 6/24/20   Johnie Rosales MD       REVIEW OF SYSTEMS:     I am not able to complete the review of systems because:    The patient is intubated and sedated    The patient has altered mental status due to his acute medical problems    The patient has baseline aphasia from prior stroke(s)    The patient has baseline dementia and is not reliable historian    The patient is in acute medical distress and unable to provide information           Total of 12 systems reviewed as follows:       POSITIVE= underlined text  Negative = text not underlined  General:  fever, chills, sweats, generalized weakness, weight loss/gain,      loss of appetite   Eyes:    blurred vision, eye pain, loss of vision, double vision  ENT:    rhinorrhea, pharyngitis   Respiratory:   cough, sputum production, SOB, PARRA, wheezing, pleuritic pain   Cardiology:   chest pain, palpitations, orthopnea, PND, edema, syncope   Gastrointestinal:  abdominal pain , N/V, diarrhea, dysphagia, constipation, bleeding   Genitourinary:  frequency, urgency, dysuria, hematuria, incontinence   Muskuloskeletal :  arthralgia, myalgia, back pain  Hematology:  easy bruising, nose or gum bleeding, lymphadenopathy   Dermatological: rash, ulceration, pruritis, color change / jaundice  Endocrine:   hot flashes or polydipsia   Neurological:  headache, dizziness, confusion, focal weakness, paresthesia,     Speech difficulties, memory loss, gait difficulty  Psychological: Feelings of anxiety, depression, agitation    Objective:   VITALS:    Visit Vitals  /66   Pulse 62   Temp 98 °F (36.7 °C)   Resp 16   Ht 5' 5\" (1.651 m)   Wt 90.7 kg (200 lb)   SpO2 98%   BMI 33.28 kg/m²       PHYSICAL EXAM:    General:    Alert, cooperative, no distress, appears stated age. HEENT: Atraumatic, anicteric sclerae, pink conjunctivae     No oral ulcers, mucosa moist, throat clear, dentition fair  Neck:  Supple, symmetrical,  thyroid: non tender  Lungs:   Clear to auscultation bilaterally. No Wheezing or Rhonchi. No rales. Chest wall:  No tenderness  No Accessory muscle use.   Heart:   Regular  rhythm,  No murmur   No edema  Abdomen:   Soft, non-tender. Not distended. Bowel sounds normal  Extremities: No cyanosis. No clubbing,      Skin turgor normal, Capillary refill normal, Radial dial pulse 2+  Skin:     Not pale. Not Jaundiced  No rashes   Psych:  Good insight. Not depressed. Not anxious or agitated. Neurologic: EOMs intact. No facial asymmetry. No aphasia or slurred speech. Symmetrical strength, Sensation grossly intact. Alert and oriented X 4.     _______________________________________________________________________  Care Plan discussed with:    Comments   Patient x    Family  x  daughter at bedside   RN x    Care Manager                    Consultant:      _______________________________________________________________________  Expected  Disposition:   Home with Family x   HH/PT/OT/RN    SNF/LTC    SKYLAR    ________________________________________________________________________  TOTAL TIME:  72 Minutes    Critical Care Provided     Minutes non procedure based      Comments     Reviewed previous records   >50% of visit spent in counseling and coordination of care  Discussion with patient and/or family and questions answered       ________________________________________________________________________  Signed: Soto Eng MD    Procedures: see electronic medical records for all procedures/Xrays and details which were not copied into this note but were reviewed prior to creation of Plan.     LAB DATA REVIEWED:    Recent Results (from the past 24 hour(s))   GLUCOSE, POC    Collection Time: 11/07/20 10:20 AM   Result Value Ref Range    Glucose (POC) 147 (H) 65 - 100 mg/dL    Performed by Kiesha Nichols    GLUCOSE, POC    Collection Time: 11/07/20 11:17 AM   Result Value Ref Range    Glucose (POC) 124 (H) 65 - 100 mg/dL    Performed by Jayy Collado EMT    CBC WITH AUTOMATED DIFF    Collection Time: 11/07/20 11:21 AM   Result Value Ref Range    WBC 5.9 3.6 - 11.0 K/uL    RBC 4.43 3.80 - 5.20 M/uL    HGB 13.2 11.5 - 16.0 g/dL    HCT 39.2 35.0 - 47.0 %    MCV 88.5 80.0 - 99.0 FL    MCH 29.8 26.0 - 34.0 PG    MCHC 33.7 30.0 - 36.5 g/dL    RDW 13.6 11.5 - 14.5 %    PLATELET 96 (L) 161 - 400 K/uL    MPV ABNORMAL 8.9 - 12.9 FL    NRBC 0.0 0  WBC    ABSOLUTE NRBC 0.00 0.00 - 0.01 K/uL    NEUTROPHILS 48 32 - 75 %    LYMPHOCYTES 36 12 - 49 %    MONOCYTES 14 (H) 5 - 13 %    EOSINOPHILS 0 0 - 7 %    BASOPHILS 0 0 - 1 %    IMMATURE GRANULOCYTES 2 (H) 0.0 - 0.5 %    ABS. NEUTROPHILS 2.8 1.8 - 8.0 K/UL    ABS. LYMPHOCYTES 2.1 0.8 - 3.5 K/UL    ABS. MONOCYTES 0.8 0.0 - 1.0 K/UL    ABS. EOSINOPHILS 0.0 0.0 - 0.4 K/UL    ABS. BASOPHILS 0.0 0.0 - 0.1 K/UL    ABS. IMM. GRANS. 0.1 (H) 0.00 - 0.04 K/UL    DF AUTOMATED     METABOLIC PANEL, COMPREHENSIVE    Collection Time: 11/07/20 11:21 AM   Result Value Ref Range    Sodium 138 136 - 145 mmol/L    Potassium 4.2 3.5 - 5.1 mmol/L    Chloride 107 97 - 108 mmol/L    CO2 27 21 - 32 mmol/L    Anion gap 4 (L) 5 - 15 mmol/L    Glucose 117 (H) 65 - 100 mg/dL    BUN 17 6 - 20 MG/DL    Creatinine 0.67 0.55 - 1.02 MG/DL    BUN/Creatinine ratio 25 (H) 12 - 20      GFR est AA >60 >60 ml/min/1.73m2    GFR est non-AA >60 >60 ml/min/1.73m2    Calcium 9.5 8.5 - 10.1 MG/DL    Bilirubin, total 0.4 0.2 - 1.0 MG/DL    ALT (SGPT) 18 12 - 78 U/L    AST (SGOT) 13 (L) 15 - 37 U/L    Alk.  phosphatase 74 45 - 117 U/L    Protein, total 7.2 6.4 - 8.2 g/dL    Albumin 3.9 3.5 - 5.0 g/dL    Globulin 3.3 2.0 - 4.0 g/dL    A-G Ratio 1.2 1.1 - 2.2     PROTHROMBIN TIME + INR    Collection Time: 11/07/20 11:21 AM   Result Value Ref Range    INR 1.0 0.9 - 1.1      Prothrombin time 10.3 9.0 - 11.1 sec   MAGNESIUM    Collection Time: 11/07/20 11:21 AM   Result Value Ref Range    Magnesium 2.0 1.6 - 2.4 mg/dL   EKG, 12 LEAD, INITIAL    Collection Time: 11/07/20 12:28 PM   Result Value Ref Range    Ventricular Rate 53 BPM    Atrial Rate 53 BPM    P-R Interval 202 ms    QRS Duration 88 ms    Q-T Interval 444 ms    QTC Calculation (Bezet) 416 ms    Calculated P Axis 51 degrees    Calculated R Axis -4 degrees    Calculated T Axis 2 degrees    Diagnosis       Sinus bradycardia  When compared with ECG of 19-OCT-2017 10:43,  No significant change was found

## 2020-11-07 NOTE — ED NOTES
Pt became unresponsive less talking in CT;Dr. mendez is here paging teleneuro for pt  Aphasic moaning.  now

## 2020-11-07 NOTE — ED PROVIDER NOTES
EMERGENCY DEPARTMENT HISTORY AND PHYSICAL EXAM      Date: 11/7/2020  Patient Name: Nathan Avalos    Please note that this dictation was completed with [employer], the computer voice recognition software. Quite often unanticipated grammatical, syntax, homophones, and other interpretive errors are inadvertently transcribed by the computer software. Please disregard these errors. Please excuse any errors that have escaped final proofreading. History of Presenting Illness     Chief Complaint   Patient presents with   [address] triage via wheelchair with c/o vision changes (flashing lights), slurred speech and weakness today - 3 episodes today. Hx occular migraines with vision changes, but no other associated sx. She was visiting her  in the CCU when sx started. This current episode seems to be resolving.  Extremity Weakness    Dysarthria       History Provided By: Patient     HPI: Nathan Avalos, 76 y.o. female, presenting the emergency department complaining of several episodes of transient right upper extremity weakness, dizziness and trouble with speech. Patient reports first episode started this morning. She awoke and had some right upper extremity weakness and felt dizzy and was unable to stand. Symptoms lasted about 5 minutes. Was associated with slurring of speech. This happened 2 more times today prompting her visit to the emergency department. At this time she has no complaints. She feels normal, her speech is clear and fluent and has no focal deficits. Nothing brings on the symptoms. Nothing makes them better, nothing makes them worse. Does have a recent history of diagnosis of ocular migraines and was complaining of some flashers in her right eye and saw ophthalmology for this, and was placed on some eyedrops with a diagnosis of likely ocular migraine.   No visual disturbance at this time    PCP: Star Jiang MD    No current facility-administered medications on file prior to encounter. Current Outpatient Medications on File Prior to Encounter   Medication Sig Dispense Refill    gemfibroziL (LOPID) 600 mg tablet TAKE 1 TABLET BY MOUTH  TWICE DAILY 180 Tab 3    atenoloL (TENORMIN) 50 mg tablet TAKE 1.5 TABLET BY MOUTH  DAILY. (Appointment needs to be made for further refills) 45 Tab 3    cholecalciferol (Vitamin D3) (1000 Units /25 mcg) tablet Take 1,000 Units by mouth daily.  budesonide-formoterol (SYMBICORT) 160-4.5 mcg/actuation HFAA Take 2 Puffs by inhalation two (2) times a day. 3 Inhaler 3    DULoxetine (CYMBALTA) 30 mg capsule TAKE 1 CAPSULE BY MOUTH  DAILY 90 Cap 3    montelukast (SINGULAIR) 10 mg tablet TAKE 1 TABLET BY MOUTH  DAILY 90 Tab 3    clonazePAM (KLONOPIN) 1 mg tablet Take 1 Tab by mouth daily as needed (anxiety). 90 Tab 3    omega-3 fatty acids-vitamin e (Fish Oil) 1,000 mg cap Take 1 Cap by mouth two (2) times a day.  lysine (L-LYSINE) 500 mg Tab tablet Take 500 mg by mouth as needed.  aspirin 81 mg chewable tablet Take 81 mg by mouth daily.  fexofenadine (ALLEGRA) 180 mg tablet Take 180 mg by mouth daily.  diclofenac EC (VOLTAREN) 75 mg EC tablet Take 1 Tab by mouth two (2) times daily (with meals). 60 Tab 1    [DISCONTINUED] albuterol (PROVENTIL, VENTOLIN) 90 mcg/Actuation inhaler Take 2 Puffs by inhalation every six (6) hours as needed.          Past History     Past Medical History:  Past Medical History:   Diagnosis Date    Allergic rhinitis, cause unspecified 1/17/2011    Asthma     Asthma     Bladder cancer ([facility] [address] 7/7/2010    Chronic airway obstruction, not elsewhere classified 1/17/2011    Depression 1/17/2011    Diverticulosis 7/7/2010    Encounter for long-term (current) use of other medications 1/17/2011    Essential hypertension, benign 11/30/2012    Hypercholesterolemia     Mixed hyperlipidemia 1/17/2011    Sleep apnea     CPAP       Past Surgical History:  Past Surgical History:   Procedure Laterality Date    ENDOSCOPY, COLON, DIAGNOSTIC      HX CARPAL TUNNEL RELEASE      right    HX CHOLECYSTECTOMY      HX CYSTOCELE REPAIR      HX HYSTERECTOMY      HX ORTHOPAEDIC      left thumb surgery    HX UROLOGICAL      resection of bladder tumor; bladder sling    REPAIR OF RECTOCELE         Family History:  Family History   Problem Relation Age of Onset    Diabetes Mother     Heart Disease Mother     Elevated Lipids Mother     Cancer Mother         basal cell ca    Hypertension Father     Cancer Father         basal cell ca    Cancer Sister         breast    Breast Cancer Sister         46s       Social History:  Social History     Tobacco Use    Smoking status: Never Smoker    Smokeless tobacco: Never Used   Substance Use Topics    Alcohol use: No     Comment: occ. wine    Drug use: No       Allergies: Allergies   Allergen Reactions    Ciprofloxacin Unknown (comments)    Hydrocodone Unknown (comments)    Iodine Shortness of Breath    Lithium Unknown (comments)    Pravastatin Myalgia    Simvastatin Unknown (comments)    Wellbutrin [Bupropion Hcl] Unknown (comments)         Review of Systems   Review of Systems   Constitutional: Negative for chills and fever. HENT: Negative for congestion and sore throat. Eyes: Negative for visual disturbance. Respiratory: Negative for cough and shortness of breath. Cardiovascular: Negative for chest pain and leg swelling. Gastrointestinal: Negative for abdominal pain, blood in stool, diarrhea and nausea. Endocrine: Negative for polyuria. Genitourinary: Negative for dysuria, flank pain, vaginal bleeding and vaginal discharge. Musculoskeletal: Negative for myalgias. Skin: Negative for rash. Allergic/Immunologic: Negative for immunocompromised state. Neurological: Positive for dizziness, speech difficulty and weakness. Negative for headaches. Psychiatric/Behavioral: Negative for confusion.        Physical Exam   Physical Exam  Vitals signs and nursing note reviewed. Constitutional:       Appearance: She is well-developed. HENT:      Head: Normocephalic and atraumatic. Eyes:      General:         Right eye: No discharge. Left eye: No discharge. Conjunctiva/sclera: Conjunctivae normal.      Pupils: Pupils are equal, round, and reactive to light. Neck:      Musculoskeletal: Normal range of motion and neck supple. Trachea: No tracheal deviation. Cardiovascular:      Rate and Rhythm: Normal rate and regular rhythm. Heart sounds: Normal heart sounds. No murmur. Pulmonary:      Effort: Pulmonary effort is normal. No respiratory distress. Breath sounds: Normal breath sounds. No wheezing or rales. Abdominal:      General: Bowel sounds are normal.      Palpations: Abdomen is soft. Tenderness: There is no abdominal tenderness. There is no guarding or rebound. Musculoskeletal: Normal range of motion. General: No tenderness or deformity. Skin:     General: Skin is warm and dry. Findings: No erythema or rash. Neurological:      Mental Status: She is alert and oriented to person, place, and time. Comments: No facial droop, no slurring of speech, tongue protrudes at midline. Equal strength in the upper and lower extremities. Normal sensation throughout. Normal finger-to-nose.    Psychiatric:         Behavior: Behavior normal.         Diagnostic Study Results     Labs -     Recent Results (from the past 12 hour(s))   GLUCOSE, POC    Collection Time: 11/07/20 10:20 AM   Result Value Ref Range    Glucose (POC) 147 (H) 65 - 100 mg/dL    Performed by Kiesha Nichols    GLUCOSE, POC    Collection Time: 11/07/20 11:17 AM   Result Value Ref Range    Glucose (POC) 124 (H) 65 - 100 mg/dL    Performed by Jayy Collado [facility]    CBC WITH AUTOMATED DIFF    Collection Time: 11/07/20 11:21 AM   Result Value Ref Range    WBC 5.9 3.6 - 11.0 K/uL    RBC 4.43 3.80 - 5.20 M/uL    HGB 13.2 11.5 - 16.0 g/dL    HCT 39.2 35.0 - 47.0 %    MCV 88.5 80.0 - 99.0 FL    MCH 29.8 26.0 - 34.0 PG    MCHC 33.7 30.0 - 36.5 g/dL    RDW 13.6 11.5 - 14.5 %    PLATELET 96 (L) 152 - 400 K/uL    MPV ABNORMAL 8.9 - 12.9 FL    NRBC 0.0 0  WBC    ABSOLUTE NRBC 0.00 0.00 - 0.01 K/uL    NEUTROPHILS 48 32 - 75 %    LYMPHOCYTES 36 12 - 49 %    MONOCYTES 14 (H) 5 - 13 %    EOSINOPHILS 0 0 - 7 %    BASOPHILS 0 0 - 1 %    IMMATURE GRANULOCYTES 2 (H) 0.0 - 0.5 %    ABS. NEUTROPHILS 2.8 1.8 - 8.0 K/UL    ABS. LYMPHOCYTES 2.1 0.8 - 3.5 K/UL    ABS. MONOCYTES 0.8 0.0 - 1.0 K/UL    ABS. EOSINOPHILS 0.0 0.0 - 0.4 K/UL    ABS. BASOPHILS 0.0 0.0 - 0.1 K/UL    ABS. IMM. GRANS. 0.1 (H) 0.00 - 0.04 K/UL    DF AUTOMATED     METABOLIC PANEL, COMPREHENSIVE    Collection Time: 11/07/20 11:21 AM   Result Value Ref Range    Sodium 138 136 - 145 mmol/L    Potassium 4.2 3.5 - 5.1 mmol/L    Chloride 107 97 - 108 mmol/L    CO2 27 21 - 32 mmol/L    Anion gap 4 (L) 5 - 15 mmol/L    Glucose 117 (H) 65 - 100 mg/dL    BUN 17 6 - 20 MG/DL    Creatinine 0.67 0.55 - 1.02 MG/DL    BUN/Creatinine ratio 25 (H) 12 - 20      GFR est AA >60 >60 ml/min/1.73m2    GFR est non-AA >60 >60 ml/min/1.73m2    Calcium 9.5 8.5 - 10.1 MG/DL    Bilirubin, total 0.4 0.2 - 1.0 MG/DL    ALT (SGPT) 18 12 - 78 U/L    AST (SGOT) 13 (L) 15 - 37 U/L    Alk.  phosphatase 74 45 - 117 U/L    Protein, total 7.2 6.4 - 8.2 g/dL    Albumin 3.9 3.5 - 5.0 g/dL    Globulin 3.3 2.0 - 4.0 g/dL    A-G Ratio 1.2 1.1 - 2.2     PROTHROMBIN TIME + INR    Collection Time: 11/07/20 11:21 AM   Result Value Ref Range    INR 1.0 0.9 - 1.1      Prothrombin time 10.3 9.0 - 11.1 sec   MAGNESIUM    Collection Time: 11/07/20 11:21 AM   Result Value Ref Range    Magnesium 2.0 1.6 - 2.4 mg/dL   EKG, 12 LEAD, INITIAL    Collection Time: 11/07/20 12:28 PM   Result Value Ref Range    Ventricular Rate 53 BPM    Atrial Rate 53 BPM    P-R Interval 202 ms    QRS Duration 88 ms    Q-T Interval 444 ms    QTC Calculation (Bezet) 416 ms    Calculated P Axis 51 degrees    Calculated R Axis -4 degrees    Calculated T Axis 2 degrees    Diagnosis       Sinus bradycardia  When compared with ECG of 19-OCT-2017 10:43,  No significant change was found         Radiologic Studies -   CT HEAD WO CONT   Final Result   IMPRESSION:     1. No acute intracranial abnormalities. 2. Paranasal sinus inflammatory changes as noted above. CT Results  (Last 48 hours)               11/07/20 1116  CT HEAD WO CONT Final result    Impression:  IMPRESSION:     1. No acute intracranial abnormalities. 2. Paranasal sinus inflammatory changes as noted above. Narrative:  EXAMINATION:  CT HEAD WO CONT       CLINICAL INFORMATION:  stroke like symptoms   COMPARISON:  None. TECHNIQUE: Routine axial head CT was performed. IV contrast was not   administered. Sagittal and coronal reconstructions were generated. CT dose reduction was achieved through use of a standardized protocol tailored   for this examination and automatic exposure control for dose modulation. FINDINGS:   No acute infarct, hemorrhage or mass. VENTRICULAR SYSTEM:  Normal for age. BASAL CISTERNS:  Patent. BRAIN PARENCHYMA:  No significant abnormalities. MIDLINE SHIFT:  None. CALVARIUM/ SKULL BASE: Intact. PARANASAL SINUSES AND MASTOID AIR CELLS: Small right maxillary sinus retention   cyst. Left maxillary sinus clear. Partial bilateral ethmoid opacification. Frontal sinuses clear. Fluid or retention cyst in the left sphenoid air cell. Mastoid sinuses clear. VISUALIZED ORBITS: No significant abnormalities. SELLA: No enlargement. CXR Results  (Last 48 hours)    None            Medical Decision Making   I am the first provider for this patient. I reviewed the vital signs, available nursing notes, past medical history, past surgical history, family history and social history. Vital Signs-Reviewed the patient's vital signs.   Patient Vitals for the past 12 hrs:   Temp Pulse Resp BP SpO2   11/07/20 1400  62 16 125/66 98 %   11/07/20 1345  (!) 57 18 (!) 111/95 97 %   11/07/20 1330  (!) 54 17 115/60 97 %   11/07/20 1315  (!) 55 18 115/62 97 %   11/07/20 1300  (!) 51 16 (!) 113/57 98 %   11/07/20 1230  (!) 54 18 115/65 97 %   11/07/20 1215  (!) 58 14 (!) 120/54 97 %   11/07/20 1200  (!) 56 15 120/67 96 %   11/07/20 1145  62 22 (!) 118/58 97 %   11/07/20 1130  (!) 59 13 111/62 96 %   11/07/20 1100  (!) 59 22 (!) 104/54 96 %   11/07/20 1017 98 °F (36.7 °C) (!) 57 11 125/79 98 %         Records Reviewed:   Nursing notes, Prior visits         Provider Notes (Medical Decision Making): Concern for stuttering neurologic symptoms could be secondary to critical stenosis, TIA, seizure activity, complex migraine. Will obtain CT of the head to assess for gross intracranial lesion. Patient should get a CTA, but has an IV dye allergy with anaphylaxis. Will likely need MRA for further evaluation. ED Course:   Initial assessment performed. The patients presenting problems have been discussed, and they are in agreement with the care plan formulated and outlined with them. I have encouraged them to ask questions as they arise throughout their visit. ED Course as of Nov 07 1501   Sat Nov 07, 2020   1111 Patient over in CT scan. Started having slurred speech rue weakness. Started suddenly per ct tech. Will obtain CT, discuss with tele neurology    [AR]   1148 Recommends aspirin, 300 plavix, ns, MRI, MRA, EEG    [AR]      ED Course User Index  [AR] Ollie Ames,                Critical Care Time:   none    Disposition:    Patient is being admitted to the hospital by Dr. Gauri Escobar . The results of their tests and reasons for their admission have been discussed with them and/or available family. They convey agreement and understanding for the need to be admitted and for their admission diagnosis.   Consultation has been made with the inpatient physician specialist for hospitalization. PLAN:  1. Current Discharge Medication List        2. Follow-up Information    None         Return to ED if worse     Diagnosis     Clinical Impression:   1. Stroke-like symptom        Attestations:   This note was completed by Brando Holm DO

## 2020-11-07 NOTE — ED PROVIDER NOTES
HPI  
 
Past Medical History:  
Diagnosis Date  Allergic rhinitis, cause unspecified 1/17/2011  Asthma  Asthma  Bladder cancer ([facility] [address] 7/7/2010  Chronic airway obstruction, not elsewhere classified 1/17/2011  Depression 1/17/2011  Diverticulosis 7/7/2010  Encounter for long-term (current) use of other medications 1/17/2011  Essential hypertension, benign 11/30/2012  Hypercholesterolemia  Mixed hyperlipidemia 1/17/2011  Sleep apnea CPAP Past Surgical History:  
Procedure Laterality Date  ENDOSCOPY, COLON, DIAGNOSTIC    
 HX CARPAL TUNNEL RELEASE    
 right  HX CHOLECYSTECTOMY  HX CYSTOCELE REPAIR    
 HX HYSTERECTOMY  HX ORTHOPAEDIC    
 left thumb surgery  HX UROLOGICAL    
 resection of bladder tumor; bladder sling  REPAIR OF RECTOCELE Family History:  
Problem Relation Age of Onset  Diabetes Mother  Heart Disease Mother  Elevated Lipids Mother  Cancer Mother   
     basal cell ca  Hypertension Father  Cancer Father   
     basal cell ca  Cancer Sister   
     breast  
 Breast Cancer Sister 46s Social History Socioeconomic History  Marital status:  Spouse name: Not on file  Number of children: Not on file  Years of education: Not on file  Highest education level: Not on file Occupational History  Not on file Social Needs  Financial resource strain: Not on file  Food insecurity Worry: Not on file Inability: Not on file  Transportation needs Medical: Not on file Non-medical: Not on file Tobacco Use  Smoking status: Never Smoker  Smokeless tobacco: Never Used Substance and Sexual Activity  Alcohol use: No  
  Comment: occ. wine  Drug use: No  
 Sexual activity: Yes  
  Partners: Male Lifestyle  Physical activity Days per week: Not on file Minutes per session: Not on file  Stress: Not on file Relationships  Social connections Talks on phone: Not on file Gets together: Not on file Attends [employer] service: Not on file Active member of club or organization: Not on file Attends meetings of clubs or organizations: Not on file Relationship status: Not on file  Intimate partner violence Fear of current or ex partner: Not on file Emotionally abused: Not on file Physically abused: Not on file Forced sexual activity: Not on file Other Topics Concern  Not on file Social History Narrative  Not on file ALLERGIES: Ciprofloxacin; Hydrocodone; Iodine; Lithium; Pravastatin; Simvastatin; and Wellbutrin [bupropion hcl] Review of Systems Vitals:  
 11/07/20 1017 BP: 125/79 Pulse: (!) 57 Resp: 11 Temp: 98 °F (36.7 °C) SpO2: 98% Weight: 90.7 kg (200 lb) Height: 5' 5\" (1.651 m) Physical Exam  
 
MDM Procedures

## 2020-11-07 NOTE — ED NOTES
TRANSFER - OUT REPORT:    Verbal report given to Barbara RESENDIZ(name) on Rena Rollins  being transferred to Neuro tele(unit) for routine progression of care       Report consisted of patients Situation, Background, Assessment and   Recommendations(SBAR). Information from the following report(s) SBAR, Kardex, ED Summary, Procedure Summary, Intake/Output, MAR, Accordion, Recent Results, Med Rec Status and Cardiac Rhythm NSR was reviewed with the receiving nurse. Lines:   Peripheral IV 11/07/20 Right Antecubital (Active)   Site Assessment Clean, dry, & intact 11/07/20 1126   Phlebitis Assessment 0 11/07/20 1126   Infiltration Assessment 0 11/07/20 1126   Dressing Status Clean, dry, & intact 11/07/20 1126   Dressing Type Tape;Transparent 11/07/20 1126        Opportunity for questions and clarification was provided.       Patient transported with:

## 2020-11-07 NOTE — PROGRESS NOTES
Problem: Falls - Risk of  Goal: *Absence of Falls  Description: Document Guerrero Jade Fall Risk and appropriate interventions in the flowsheet.   Outcome: Progressing Towards Goal  Note: Fall Risk Interventions:                 Elimination Interventions: Bed/chair exit alarm, Call light in reach, Patient to call for help with toileting needs, Stay With Me (per policy), Toileting schedule/hourly rounds, Toilet paper/wipes in reach    History of Falls Interventions: Bed/chair exit alarm

## 2020-11-08 PROBLEM — E11.42 DIABETIC PERIPHERAL NEUROPATHY ASSOCIATED WITH TYPE 2 DIABETES MELLITUS (HCC): Status: ACTIVE | Noted: 2020-11-08

## 2020-11-08 PROBLEM — I63.9 LEFT PONTINE STROKE (HCC): Status: ACTIVE | Noted: 2020-11-08

## 2020-11-08 PROBLEM — I63.22 BASILAR ARTERY OCCLUSION WITH CEREBRAL INFARCTION (HCC): Status: ACTIVE | Noted: 2020-11-08

## 2020-11-08 PROBLEM — I65.23 BILATERAL CAROTID ARTERY STENOSIS: Status: ACTIVE | Noted: 2020-11-08

## 2020-11-08 LAB
ALBUMIN SERPL-MCNC: 3.7 G/DL (ref 3.5–5)
ALBUMIN/GLOB SERPL: 1.2 {RATIO} (ref 1.1–2.2)
ALP SERPL-CCNC: 72 U/L (ref 45–117)
ALT SERPL-CCNC: 20 U/L (ref 12–78)
AMMONIA PLAS-SCNC: 49 UMOL/L
ANION GAP SERPL CALC-SCNC: 6 MMOL/L (ref 5–15)
AST SERPL-CCNC: 12 U/L (ref 15–37)
ATRIAL RATE: 53 BPM
BILIRUB DIRECT SERPL-MCNC: 0.1 MG/DL (ref 0–0.2)
BILIRUB SERPL-MCNC: 0.4 MG/DL (ref 0.2–1)
BUN SERPL-MCNC: 14 MG/DL (ref 6–20)
BUN/CREAT SERPL: 25 (ref 12–20)
CALCIUM SERPL-MCNC: 9.8 MG/DL (ref 8.5–10.1)
CALCULATED P AXIS, ECG09: 51 DEGREES
CALCULATED R AXIS, ECG10: -4 DEGREES
CALCULATED T AXIS, ECG11: 2 DEGREES
CHLORIDE SERPL-SCNC: 107 MMOL/L (ref 97–108)
CHOLEST SERPL-MCNC: 183 MG/DL
CO2 SERPL-SCNC: 25 MMOL/L (ref 21–32)
CREAT SERPL-MCNC: 0.56 MG/DL (ref 0.55–1.02)
DIAGNOSIS, 93000: NORMAL
EST. AVERAGE GLUCOSE BLD GHB EST-MCNC: 131 MG/DL
GLOBULIN SER CALC-MCNC: 3.2 G/DL (ref 2–4)
GLUCOSE SERPL-MCNC: 141 MG/DL (ref 65–100)
HBA1C MFR BLD: 6.2 % (ref 4–5.6)
HDLC SERPL-MCNC: 28 MG/DL
HDLC SERPL: 6.5 {RATIO} (ref 0–5)
LDLC SERPL CALC-MCNC: ABNORMAL MG/DL (ref 0–100)
LDLC SERPL DIRECT ASSAY-MCNC: 104 MG/DL (ref 0–100)
LIPID PROFILE,FLP: ABNORMAL
MAGNESIUM SERPL-MCNC: 2 MG/DL (ref 1.6–2.4)
P-R INTERVAL, ECG05: 202 MS
PHOSPHATE SERPL-MCNC: 5 MG/DL (ref 2.6–4.7)
POTASSIUM SERPL-SCNC: 3.7 MMOL/L (ref 3.5–5.1)
PROT SERPL-MCNC: 6.9 G/DL (ref 6.4–8.2)
Q-T INTERVAL, ECG07: 444 MS
QRS DURATION, ECG06: 88 MS
QTC CALCULATION (BEZET), ECG08: 416 MS
SODIUM SERPL-SCNC: 138 MMOL/L (ref 136–145)
TRIGL SERPL-MCNC: 424 MG/DL (ref ?–150)
TSH SERPL DL<=0.05 MIU/L-ACNC: 0.85 UIU/ML (ref 0.36–3.74)
VENTRICULAR RATE, ECG03: 53 BPM
VLDLC SERPL CALC-MCNC: ABNORMAL MG/DL

## 2020-11-08 PROCEDURE — 83721 ASSAY OF BLOOD LIPOPROTEIN: CPT

## 2020-11-08 PROCEDURE — 83036 HEMOGLOBIN GLYCOSYLATED A1C: CPT

## 2020-11-08 PROCEDURE — 74011250637 HC RX REV CODE- 250/637: Performed by: INTERNAL MEDICINE

## 2020-11-08 PROCEDURE — 74011000250 HC RX REV CODE- 250: Performed by: INTERNAL MEDICINE

## 2020-11-08 PROCEDURE — 36415 COLL VENOUS BLD VENIPUNCTURE: CPT

## 2020-11-08 PROCEDURE — 97530 THERAPEUTIC ACTIVITIES: CPT

## 2020-11-08 PROCEDURE — 80061 LIPID PANEL: CPT

## 2020-11-08 PROCEDURE — 94640 AIRWAY INHALATION TREATMENT: CPT

## 2020-11-08 PROCEDURE — 97165 OT EVAL LOW COMPLEX 30 MIN: CPT

## 2020-11-08 PROCEDURE — 80076 HEPATIC FUNCTION PANEL: CPT

## 2020-11-08 PROCEDURE — 97116 GAIT TRAINING THERAPY: CPT

## 2020-11-08 PROCEDURE — 84443 ASSAY THYROID STIM HORMONE: CPT

## 2020-11-08 PROCEDURE — 83735 ASSAY OF MAGNESIUM: CPT

## 2020-11-08 PROCEDURE — 80048 BASIC METABOLIC PNL TOTAL CA: CPT

## 2020-11-08 PROCEDURE — 99223 1ST HOSP IP/OBS HIGH 75: CPT | Performed by: PSYCHIATRY & NEUROLOGY

## 2020-11-08 PROCEDURE — 74011250636 HC RX REV CODE- 250/636: Performed by: INTERNAL MEDICINE

## 2020-11-08 PROCEDURE — 84100 ASSAY OF PHOSPHORUS: CPT

## 2020-11-08 PROCEDURE — 82140 ASSAY OF AMMONIA: CPT

## 2020-11-08 PROCEDURE — 65660000000 HC RM CCU STEPDOWN

## 2020-11-08 PROCEDURE — 97535 SELF CARE MNGMENT TRAINING: CPT

## 2020-11-08 PROCEDURE — 97161 PT EVAL LOW COMPLEX 20 MIN: CPT

## 2020-11-08 RX ORDER — LORATADINE AND PSEUDOEPHEDRINE 10; 240 MG/1; MG/1
1 TABLET, EXTENDED RELEASE ORAL DAILY
Status: DISCONTINUED | OUTPATIENT
Start: 2020-11-09 | End: 2020-11-08

## 2020-11-08 RX ORDER — CLOPIDOGREL BISULFATE 75 MG/1
75 TABLET ORAL DAILY
Status: DISCONTINUED | OUTPATIENT
Start: 2020-11-08 | End: 2020-11-09 | Stop reason: HOSPADM

## 2020-11-08 RX ORDER — MINERAL OIL
180 ENEMA (ML) RECTAL DAILY
Status: DISCONTINUED | OUTPATIENT
Start: 2020-11-09 | End: 2020-11-09 | Stop reason: HOSPADM

## 2020-11-08 RX ORDER — LORATADINE 10 MG/1
10 TABLET ORAL DAILY
Status: DISCONTINUED | OUTPATIENT
Start: 2020-11-09 | End: 2020-11-08

## 2020-11-08 RX ADMIN — GEMFIBROZIL 600 MG: 600 TABLET ORAL at 08:59

## 2020-11-08 RX ADMIN — ATENOLOL 25 MG: 25 TABLET ORAL at 08:59

## 2020-11-08 RX ADMIN — CLOPIDOGREL BISULFATE 75 MG: 75 TABLET ORAL at 12:03

## 2020-11-08 RX ADMIN — MONTELUKAST 10 MG: 10 TABLET, FILM COATED ORAL at 21:30

## 2020-11-08 RX ADMIN — GEMFIBROZIL 600 MG: 600 TABLET ORAL at 17:54

## 2020-11-08 RX ADMIN — ENOXAPARIN SODIUM 40 MG: 40 INJECTION SUBCUTANEOUS at 16:05

## 2020-11-08 RX ADMIN — VITAMIN D, TAB 1000IU (100/BT) 1 TABLET: 25 TAB at 08:59

## 2020-11-08 RX ADMIN — DULOXETINE HYDROCHLORIDE 30 MG: 30 CAPSULE, DELAYED RELEASE ORAL at 08:59

## 2020-11-08 RX ADMIN — ARFORMOTEROL TARTRATE: 15 SOLUTION RESPIRATORY (INHALATION) at 21:27

## 2020-11-08 RX ADMIN — ASPIRIN 81 MG CHEWABLE TABLET 81 MG: 81 TABLET CHEWABLE at 08:59

## 2020-11-08 NOTE — PROGRESS NOTES
FUNCTIONAL STATUS PRIOR TO ADMISSION: Patient was independent and active without use of DME.     HOME SUPPORT: The patient lived with spouse but did not require assist.    Occupational Therapy Goals  Initiated 11/8/2020  1. Patient will perform ADLS including gathering supplies with independence within 7 days. 2.  Patient will perform toilet transfers with independence within 7 day(s). 3.  Patient will perform all aspects of toileting with independence within 7 day(s). OCCUPATIONAL THERAPY EVALUATION  Patient: Abimael Gómez (21 y.o. female)  Date: 11/8/2020  Primary Diagnosis: Seizure ([facility] [address] [R56.9]  Stroke [facility]) [I63.9]  Migraine [G43.909]       Precautions: fall       ASSESSMENT  Based on the objective data described below, the patient presents with mild balance and coordination deficits in LE. Noted with weaving pattern with walking intermittently. Also some minor difficulty with stairs. Able to sit on commode to wash LB and don/doff sock and underwear. She reports when she looks at her phone it appears concave. Anticipate she will transition to outpatient PT. When discussed she feels starting with [facility] and then transitioning to outpatient would be best.PAtient's  is in hospital due to emergent heart surgery but she can stay with daughter at discharge. Current Level of Function Impacting Discharge (ADLs/self-care): SBA    Functional Outcome Measure: The patient scored Total A-D  Total A-D (Motor Function): 66/66 on the Fugl-De La Paz Assessment which is indicative of no impairment in upper extremity functional status. Other factors to consider for discharge: none     Patient will benefit from skilled therapy intervention to address the above noted impairments.        PLAN :  Recommendations and Planned Interventions: self care training, functional mobility training, therapeutic exercise, balance training, visual/perceptual training, therapeutic activities, endurance activities, neuromuscular re-education, patient education, home safety training and family training/education    Frequency/Duration: Patient will be followed by occupational therapy 3 times a week to address goals. Recommendation for discharge: (in order for the patient to meet his/her long term goals)  No skilled occupational therapy/ follow up rehabilitation needs identified at this time. This discharge recommendation:  A follow-up discussion with the attending provider and/or case management is planned    IF patient discharges home will need the following DME: none       SUBJECTIVE:   Patient stated I saw the eye doctor because of my vision issues coming and going.     OBJECTIVE DATA SUMMARY:   HISTORY:   Past Medical History:   Diagnosis Date    Allergic rhinitis, cause unspecified 1/17/2011    Asthma     Asthma     Bladder cancer ([facility] [address] 7/7/2010    Chronic airway obstruction, not elsewhere classified 1/17/2011    Depression 1/17/2011    Diverticulosis 7/7/2010    Encounter for long-term (current) use of other medications 1/17/2011    Essential hypertension, benign 11/30/2012    Hypercholesterolemia     Mixed hyperlipidemia 1/17/2011    Sleep apnea     CPAP     Past Surgical History:   Procedure Laterality Date    ENDOSCOPY, COLON, DIAGNOSTIC      HX CARPAL TUNNEL RELEASE      right    HX CHOLECYSTECTOMY      HX CYSTOCELE REPAIR      HX HYSTERECTOMY      HX ORTHOPAEDIC      left thumb surgery    HX UROLOGICAL      resection of bladder tumor; bladder sling    REPAIR OF RECTOCELE       Expanded or extensive additional review of patient history:     Home Situation  Home Environment: Private residence  # Steps to Enter: 4  Rails to Enter: Yes  Hand Rails : Right  One/Two Story Residence: Two story, live on 1st floor  Living Alone: No  Support Systems: Spouse/Significant Other/Partner(spouse currently in the hospital)  Patient Expects to be Discharged to[de-identified] Private residence  Current DME Used/Available at Home: [employer]  Tub or Shower Type: Shower    Hand dominance: Right    EXAMINATION OF PERFORMANCE DEFICITS:  Cognitive/Behavioral Status:  Neurologic State: Alert  Orientation Level: Oriented X4  Cognition: Follows commands             Skin: intact    Edema: none    Hearing: Auditory  Auditory Impairment: None    Vision/Perceptual:                           Acuity: Impaired near vision; Impaired far vision(reports objects look concave)         Range of Motion:    AROM: Within functional limits                         Strength:    Strength: Within functional limits                Coordination:  Coordination: Generally decreased, functional  Fine Motor Skills-Upper: Left Intact; Right Intact    Gross Motor Skills-Upper: Left Intact; Right Intact    Tone & Sensation:    Tone: Normal  Sensation: Intact                      Balance:  Sitting: Intact  Standing: Impaired  Standing - Static: Good; Unsupported  Standing - Dynamic : Fair;Unsupported    Functional Mobility and Transfers for ADLs:  Bed Mobility:  Rolling: Supervision  Supine to Sit: Supervision  Scooting: Supervision    Transfers:  Sit to Stand: Stand-by assistance  Stand to Sit: Supervision  Bathroom Mobility: Stand-by assistance    ADL Assessment:  Feeding: Independent    Oral Facial Hygiene/Grooming: Independent    Bathing: Stand-by assistance    Upper Body Dressing: Independent    Lower Body Dressing: Stand-by assistance    Toileting: Stand by assistance                ADL Intervention and task modifications:                 Lower Body Bathing  Lower Body : Set-up  Position Performed: Seated in chair         Lower Body Dressing Assistance  Underpants: Stand-by assistance  Socks: Set-up                Functional Measure:  Fugl-De La Paz Assessment of Motor Recovery after Stroke:   Reflex Activity  Flexors/Biceps/Fingers: Can be elicited  Extensors/Triceps: Can be elicited  Reflex Subtotal: 4    Volitional Movement Within Synergies  Shoulder Retraction: Full  Shoulder Elevation: Full  Shoulder Abduction (90 degrees): Full  Shoulder External Rotation: Full  Elbow Flexion: Full  Forearm Supination: Full  Shoulder Adduction/Internal Rotation: Full  Elbow Extension: Full  Forearm Pronation: Full  Subtotal: 18    Volitional Movement Mixing Synergies  Hand to Lumbar Spine: Full  Shoulder Flexion (0-90 degrees): Full  Pronation-Supination: Full  Subtotal: 6    Volitional Movement With Little or No Synergy  Shoulder Abduction (0-90 degrees): Full  Shoulder Flexion ( degrees): Full  Pronation/Supination: Full  Subtotal : 6    Normal Reflex Activity  Biceps, Triceps, Finger Flexors: Full  Subtotal : 2    Upper Extremity Total   Upper Extremity Total: 36    Wrist  Stability at 15 Degree Dorsiflexion: Full  Repeated Dorsiflexion/ Volar Flexion: Full  Stability at 15 Degree Dorsiflexion: Full  Repeated Dorsiflexion/ Volar Flexion: Full  Circumduction: Full  Wrist Total: 10    Hand  Mass Flexion: Full  Mass Extension: Full  Grasp A: Full  Grasp B: Full  Grasp C: Full  Grasp D: Full  Grasp E: Full  Hand Total: 14    Coordination/Speed  Tremor: None  Dysmetria: None  Time: <1s  Coordination/Speed Total : 6    Total A-D  Total A-D (Motor Function): 66/66     This is a reliable/valid measure of arm function after a neurological event. It has established value to characterize functional status and for measuring spontaneous and therapy-induced recovery; tests proximal and distal motor functions. Fugl-De La Paz Assessment  UE scores recorded between five and 30 days post neurologic event can be used to predict UE recovery at six months post neurologic event. Severe = 0-21 points   Moderately Severe = 22-33 points   Moderate = 34-47 points   Mild = 48-66 points  SHAYNE Rivas, EMPERATRIZ Hough, & COTY De La Rosa (1992). Measurement of motor recovery after stroke: Outcome assessment and sample size requirements. Stroke, 23, pp. 9385-1194. ------------------------------------------------------------------------------------------------------------------------------------------------------------------  MCID:  Stroke:   Lara Murillo et al, 2001; n = 171; mean age 79 (6) years; assessed within 16 (12) days of stroke, Acute Stroke)  FMA Motor Scores from Admission to Discharge    10 point increase in FMA Upper Extremity = 1.5 change in discharge FIM    10 point increase in FMA Lower Extremity = 1.9 change in discharge FIM  MDC:   Stroke:   Jeffrey Joshi et al, 2008, n = 14, mean age = 59.9 (14.6) years, assessed on average 14 (6.5) months post stroke, Chronic Stroke)    FMA = 5.2 points for the Upper Extremity portion of the assessment     Occupational Therapy Evaluation Charge Determination   History Examination Decision-Making   LOW Complexity : Brief history review  LOW Complexity : 1-3 performance deficits relating to physical, cognitive , or psychosocial skils that result in activity limitations and / or participation restrictions  LOW Complexity : No comorbidities that affect functional and no verbal or physical assistance needed to complete eval tasks       Based on the above components, the patient evaluation is determined to be of the following complexity level: LOW   Pain Rating:  none    Activity Tolerance:   Good    After treatment patient left in no apparent distress:    Sitting in chair, Call bell within reach and Caregiver / family present    COMMUNICATION/EDUCATION:   The patients plan of care was discussed with: Physical therapist and Registered nurse. Patient was educated regarding her deficit(s) of impaired balance as this relates to her diagnosis of CVA (diagnosis not discussed as neurology had not seen patient. She demonstrated Good understanding as evidenced by verbal feedback. Patient and/or family was verbally educated on the BE FAST acronym for signs/symptoms of CVA and TIA.  BE FAST was written on patient's communication board for visual education and reinforcement. All questions answered with patient indicating good understanding. Home safety education was provided and the patient/caregiver indicated understanding., Patient/family have participated as able in goal setting and plan of care. and Patient/family agree to work toward stated goals and plan of care. This patients plan of care is appropriate for delegation to [facility].     Thank you for this referral.  Christi Friedman  Time Calculation: 43 mins

## 2020-11-08 NOTE — PROGRESS NOTES
* No surgery found *  * No surgery found *  Bedside shift change report given to Chidi Shepard RN (oncoming nurse) by MARTÍN Montes  (offgoing nurse).  Report included the following information SBAR, Kardex, ED Summary, Intake/Output, MAR and Recent Results.     Zone Phone:   [phone]     Significant changes during shift:  none   Patient Information     Karolina Francisco  76 y.o.  11/7/2020 10:18 AM by Tate Marie MD. Karolina Francisco was admitted from Home     Problem List          Patient Active Problem List     Diagnosis Date Noted    Migraine 11/07/2020    Seizure ([address] 11/07/2020    Stroke ([address] 11/07/2020    Recurrent depression ([address] 12/31/2017    Other chest pain 10/20/2017    Simple chronic bronchitis ([address] 10/20/2017    Pulmonary nodule, right 10/20/2017    Cystitis 10/20/2017    Colon polyps 06/02/2014    Esophageal reflux 03/29/2013    Essential hypertension, benign 11/30/2012    Chronic airway obstruction, not elsewhere classified 01/17/2011    Allergic rhinitis 01/17/2011    Depression 01/17/2011    Mixed hyperlipidemia 01/17/2011    Encounter for long-term (current) use of other medications 01/17/2011    Bladder cancer ([address] 07/07/2010    Diverticulosis 07/07/2010           Past Medical History:   Diagnosis Date    Allergic rhinitis, cause unspecified 1/17/2011    Asthma      Asthma      Bladder cancer ([address] 7/7/2010    Chronic airway obstruction, not elsewhere classified 1/17/2011    Depression 1/17/2011    Diverticulosis 7/7/2010    Encounter for long-term (current) use of other medications 1/17/2011    Essential hypertension, benign 11/30/2012    Hypercholesterolemia      Mixed hyperlipidemia 1/17/2011    Sleep apnea       CPAP      Core Measures:     CVA: Yes Yes  CHF:No No  PNA:No No     Activity Status:     OOB to Chair No  Ambulated this shift No   Bed Rest Yes        DVT prophylaxis:     DVT prophylaxis Med- Yes  DVT prophylaxis SCD or GALI- No      Wounds: (If Applicable)     Wounds- No     Patient Safety:     Falls Score Total Score: 2  Safety Level_______  Bed Alarm On? Yes  Sitter?  No     Plan for upcoming shift: EEG, ECHO, NEURO, SLP/OT/PT     Discharge Plan: No TBD     Active Consults:  IP CONSULT TO NEUROLOGY

## 2020-11-08 NOTE — PROGRESS NOTES
Problem: Falls - Risk of  Goal: *Absence of Falls  Description: Document Braeden Tucker Fall Risk and appropriate interventions in the flowsheet. Outcome: Progressing Towards Goal  Note: Fall Risk Interventions:  Mobility Interventions: Bed/chair exit alarm, OT consult for ADLs    Mentation Interventions: Adequate sleep, hydration, pain control, Bed/chair exit alarm    Medication Interventions: Bed/chair exit alarm, Teach patient to arise slowly    Elimination Interventions: Call light in reach, Bed/chair exit alarm, Toileting schedule/hourly rounds    History of Falls Interventions: Bed/chair exit alarm, Investigate reason for fall         Problem: Patient Education: Go to Patient Education Activity  Goal: Patient/Family Education  Outcome: Progressing Towards Goal     Problem: Pressure Injury - Risk of  Goal: *Prevention of pressure injury  Description: Document Sylvester Scale and appropriate interventions in the flowsheet. Outcome: Progressing Towards Goal  Note: Pressure Injury Interventions:             Activity Interventions: PT/OT evaluation, Assess need for specialty bed    Mobility Interventions: Float heels, PT/OT evaluation    Nutrition Interventions: Document food/fluid/supplement intake, Offer support with meals,snacks and hydration                     Problem: Patient Education: Go to Patient Education Activity  Goal: Patient/Family Education  Outcome: Progressing Towards Goal     Problem: Patient Education: Go to Patient Education Activity  Goal: Patient/Family Education  Outcome: Progressing Towards Goal     Problem: TIA/CVA Stroke: 0-24 hours  Goal: Off Pathway (Use only if patient is Off Pathway)  Outcome: Progressing Towards Goal  Goal: Activity/Safety  Outcome: Progressing Towards Goal  Goal: Consults, if ordered  Outcome: Progressing Towards Goal  Goal: Diagnostic Test/Procedures  Outcome: Progressing Towards Goal  Goal: Nutrition/Diet  Outcome: Progressing Towards Goal  Goal: Discharge Planning  Outcome: Progressing Towards Goal  Goal: Medications  Outcome: Progressing Towards Goal  Goal: Respiratory  Outcome: Progressing Towards Goal  Goal: Treatments/Interventions/Procedures  Outcome: Progressing Towards Goal  Goal: Minimize risk of bleeding post-thrombolytic infusion  Outcome: Progressing Towards Goal  Goal: Monitor for complications post-thrombolytic infusion  Outcome: Progressing Towards Goal  Goal: Psychosocial  Outcome: Progressing Towards Goal  Goal: *Hemodynamically stable  Outcome: Progressing Towards Goal  Goal: *Neurologically stable  Description: Absence of additional neurological deficits    Outcome: Progressing Towards Goal  Goal: *Verbalizes anxiety and depression are reduced or absent  Outcome: Progressing Towards Goal  Goal: *Absence of Signs of Aspiration on Current Diet  Outcome: Progressing Towards Goal  Goal: *Absence of deep venous thrombosis signs and symptoms(Stroke Metric)  Outcome: Progressing Towards Goal  Goal: *Ability to perform ADLs and demonstrates progressive mobility and function  Outcome: Progressing Towards Goal  Goal: *Stroke education started(Stroke Metric)  Outcome: Progressing Towards Goal  Goal: *Dysphagia screen performed(Stroke Metric)  Outcome: Progressing Towards Goal  Goal: *Rehab consulted(Stroke Metric)  Outcome: Progressing Towards Goal     Problem: TIA/CVA Stroke: Day 2 Until Discharge  Goal: Off Pathway (Use only if patient is Off Pathway)  Outcome: Progressing Towards Goal  Goal: Activity/Safety  Outcome: Progressing Towards Goal  Goal: Diagnostic Test/Procedures  Outcome: Progressing Towards Goal  Goal: Nutrition/Diet  Outcome: Progressing Towards Goal  Goal: Discharge Planning  Outcome: Progressing Towards Goal  Goal: Medications  Outcome: Progressing Towards Goal  Goal: Respiratory  Outcome: Progressing Towards Goal  Goal: Treatments/Interventions/Procedures  Outcome: Progressing Towards Goal  Goal: Psychosocial  Outcome: Progressing Towards Goal  Goal: *Verbalizes anxiety and depression are reduced or absent  Outcome: Progressing Towards Goal  Goal: *Absence of aspiration  Outcome: Progressing Towards Goal  Goal: *Absence of deep venous thrombosis signs and symptoms(Stroke Metric)  Outcome: Progressing Towards Goal  Goal: *Optimal pain control at patient's stated goal  Outcome: Progressing Towards Goal  Goal: *Tolerating diet  Outcome: Progressing Towards Goal  Goal: *Ability to perform ADLs and demonstrates progressive mobility and function  Outcome: Progressing Towards Goal  Goal: *Stroke education continued(Stroke Metric)  Outcome: Progressing Towards Goal     Problem: Ischemic Stroke: Discharge Outcomes  Goal: *Verbalizes anxiety and depression are reduced or absent  Outcome: Progressing Towards Goal  Goal: *Verbalize understanding of risk factor modification(Stroke Metric)  Outcome: Progressing Towards Goal  Goal: *Hemodynamically stable  Outcome: Progressing Towards Goal  Goal: *Absence of aspiration pneumonia  Outcome: Progressing Towards Goal  Goal: *Aware of needed dietary changes  Outcome: Progressing Towards Goal  Goal: *Verbalize understanding of prescribed medications including anti-coagulants, anti-lipid, and/or anti-platelets(Stroke Metric)  Outcome: Progressing Towards Goal  Goal: *Tolerating diet  Outcome: Progressing Towards Goal  Goal: *Aware of follow-up diagnostics related to anticoagulants  Outcome: Progressing Towards Goal  Goal: *Ability to perform ADLs and demonstrates progressive mobility and function  Outcome: Progressing Towards Goal  Goal: *Absence of DVT(Stroke Metric)  Outcome: Progressing Towards Goal  Goal: *Absence of aspiration  Outcome: Progressing Towards Goal  Goal: *Optimal pain control at patient's stated goal  Outcome: Progressing Towards Goal  Goal: *Home safety concerns addressed  Outcome: Progressing Towards Goal  Goal: *Describes available resources and support systems  Outcome: Progressing Towards Goal  Goal: *Verbalizes understanding of activation of EMS(911) for stroke symptoms(Stroke Metric)  Outcome: Progressing Towards Goal  Goal: *Understands and describes signs and symptoms to report to providers(Stroke Metric)  Outcome: Progressing Towards Goal  Goal: *Neurolgocially stable (absence of additional neurological deficits)  Outcome: Progressing Towards Goal  Goal: *Verbalizes importance of follow-up with primary care physician(Stroke Metric)  Outcome: Progressing Towards Goal  Goal: *Smoking cessation discussed,if applicable(Stroke Metric)  Outcome: Progressing Towards Goal  Goal: *Depression screening completed(Stroke Metric)  Outcome: Progressing Towards Goal     Problem: Breathing Pattern - Ineffective  Goal: *Absence of hypoxia  Outcome: Progressing Towards Goal  Goal: *Use of effective breathing techniques  Outcome: Progressing Towards Goal     Problem: Patient Education: Go to Patient Education Activity  Goal: Patient/Family Education  Outcome: Progressing Towards Goal

## 2020-11-08 NOTE — PROGRESS NOTES
* No surgery found *  * No surgery found *  Bedside shift change report given to Janice Saenz (oncoming nurse) by Dolores Pickett (offgoing nurse). Report included the following information SBAR, Kardex, ED Summary, Intake/Output, MAR and Recent Results.     Zone Phone:   [phone]    Significant changes during shift:  Patient admitted to [facility]    Patient Information    Christopher Balderrama  76 y.o.  11/7/2020 10:18 AM by Eva Rizzo MD. Christopher Balderrama was admitted from Home    Problem List    Patient Active Problem List    Diagnosis Date Noted    Migraine 11/07/2020    Seizure ([address] 11/07/2020    Stroke ([address] 11/07/2020    Recurrent depression ([address] 12/31/2017    Other chest pain 10/20/2017    Simple chronic bronchitis ([address] 10/20/2017    Pulmonary nodule, right 10/20/2017    Cystitis 10/20/2017    Colon polyps 06/02/2014    Esophageal reflux 03/29/2013    Essential hypertension, benign 11/30/2012    Chronic airway obstruction, not elsewhere classified 01/17/2011    Allergic rhinitis 01/17/2011    Depression 01/17/2011    Mixed hyperlipidemia 01/17/2011    Encounter for long-term (current) use of other medications 01/17/2011    Bladder cancer ([address] 07/07/2010    Diverticulosis 07/07/2010     Past Medical History:   Diagnosis Date    Allergic rhinitis, cause unspecified 1/17/2011    Asthma     Asthma     Bladder cancer ([facility] [address] 7/7/2010    Chronic airway obstruction, not elsewhere classified 1/17/2011    Depression 1/17/2011    Diverticulosis 7/7/2010    Encounter for long-term (current) use of other medications 1/17/2011    Essential hypertension, benign 11/30/2012    Hypercholesterolemia     Mixed hyperlipidemia 1/17/2011    Sleep apnea     CPAP     Core Measures:    CVA: Yes Yes  CHF:No No  PNA:No No    Activity Status:    OOB to Chair No  Ambulated this shift No   Bed Rest Yes    LINES AND DRAINS:  PIV    DVT prophylaxis:    DVT prophylaxis Med- Yes  DVT prophylaxis SCD or GALI- No     Wounds: (If Applicable)    Wounds- No    Patient Safety:    Falls Score Total Score: 2  Safety Level_______  Bed Alarm On? Yes  Sitter?  No    Plan for upcoming shift: MRI, eeg, echo, PT/ot/sp    Discharge Plan: No TBD    Active Consults:  IP CONSULT TO NEUROLOGY

## 2020-11-08 NOTE — PROGRESS NOTES
Hospitalist Progress Note    NAME: Elena Urbina   :  1945   MRN:  [id number]       Assessment / Plan:    Acute ischemic CVA in the left kelly POA  Patient presented with unsteady gait and left upper extremity weakness  CT head was unremarkable, MRI of the brain showed left pontine ischemic CVA  MRI showed posterior vertebral artery occlusion  Patient reports her symptoms have resolved  Continue telemetry monitoring  Echocardiogram pending  Continue aspirin, add Plavix for 4 weeks  She is not on statin due to intolerance  Hemoglobin A1c 6.2  LDL is pending, symptoms are less likely to be due to seizure as floppy arm most likely due to CVA   No need for EEG  PT OT/SLP  Blood pressure control with a goal of less than 607 systolic  Continue atenolol, blood pressure currently is at goal     Hypertension  Depression  Asthma  Continue with atenolol with holding parameters  Continue with Cymbalta  Continue with montelukast     Code Status: Full code  Surrogate Decision Maker: Her      DVT Prophylaxis: Lovenox  GI Prophylaxis: not indicated     Baseline: Ambulatory         Subjective:     Chief Complaint / Reason for Physician Visit  \" Reports her symptoms are resolved, denies any diplopia, dysphagia, dysarthria, weakness or numbness\". Discussed with RN events overnight. Review of Systems:  Symptom Y/N Comments  Symptom Y/N Comments   Fever/Chills n   Chest Pain n    Poor Appetite n   Edema n    Cough n   Abdominal Pain n    Sputum n   Joint Pain n    SOB/PARRA n   Pruritis/Rash     Nausea/vomit n   Tolerating PT/OT     Diarrhea n   Tolerating Diet     Constipation n   Other       Could NOT obtain due to:      Objective:     VITALS:   Last 24hrs VS reviewed since prior progress note.  Most recent are:  Patient Vitals for the past 24 hrs:   Temp Pulse Resp BP SpO2   20 0724 98.3 °F (36.8 °C) 60 18 (!) 142/60 95 %   20 0344 97.9 °F (36.6 °C) 69 17 132/60 96 %   20 2346 98.1 °F (36.7 °C) 63 18 (!) 140/65 97 %   11/07/20 2036     96 %   11/07/20 1919 98.2 °F (36.8 °C) (!) 58 18 132/76 98 %   11/07/20 1744 98.3 °F (36.8 °C) (!) 59 18 (!) 144/73 96 %   11/07/20 1530  60 19 132/76 95 %   11/07/20 1500  (!) 59 17 116/60 97 %   11/07/20 1430  (!) 59 19     11/07/20 1400  62 16 125/66 98 %   11/07/20 1345  (!) 57 18 (!) 111/95 97 %   11/07/20 1330  (!) 54 17 115/60 97 %   11/07/20 1315  (!) 55 18 115/62 97 %   11/07/20 1300  (!) 51 16 (!) 113/57 98 %   11/07/20 1230  (!) 54 18 115/65 97 %   11/07/20 1215  (!) 58 14 (!) 120/54 97 %   11/07/20 1200  (!) 56 15 120/67 96 %   11/07/20 1145  62 22 (!) 118/58 97 %   11/07/20 1130  (!) 59 13 111/62 96 %       Intake/Output Summary (Last 24 hours) at 11/8/2020 1110  Last data filed at 11/7/2020 1403  Gross per 24 hour   Intake 1240 ml   Output 450 ml   Net 790 ml        PHYSICAL EXAM:  General: WD, WN. Alert, cooperative, no acute distress    EENT:  EOMI. Anicteric sclerae. MMM  Resp:  CTA bilaterally, no wheezing or rales. No accessory muscle use  CV:  Regular  rhythm,  No edema  GI:  Soft, Non distended, Non tender.  +Bowel sounds  Neurologic:  Alert and oriented X 3, normal speech,   Psych:   Good insight. Not anxious nor agitated  Skin:  No rashes. No jaundice    Reviewed most current lab test results and cultures  YES  Reviewed most current radiology test results   YES  Review and summation of old records today    NO  Reviewed patient's current orders and MAR    YES  PMH/SH reviewed - no change compared to H&P  ________________________________________________________________________  Care Plan discussed with:    Comments   Patient x    Family  x  daughter   RN x    Care Manager     Consultant  x  neurology                     Multidiciplinary team rounds were held today with , nursing, pharmacist and clinical coordinator. Patient's plan of care was discussed; medications were reviewed and discharge planning was addressed. ________________________________________________________________________  Total NON critical care TIME:  35   Minutes    Total CRITICAL CARE TIME Spent:   Minutes non procedure based      Comments   >50% of visit spent in counseling and coordination of care     ________________________________________________________________________  Joaquin Schaefer MD     Procedures: see electronic medical records for all procedures/Xrays and details which were not copied into this note but were reviewed prior to creation of Plan. LABS:  I reviewed today's most current labs and imaging studies.   Pertinent labs include:  Recent Labs     11/07/20  1121   WBC 5.9   HGB 13.2   HCT 39.2   PLT 96*     Recent Labs     11/08/20  0326 11/07/20  1121    138   K 3.7 4.2    107   CO2 25 27   * 117*   BUN 14 17   CREA 0.56 0.67   CA 9.8 9.5   MG 2.0 2.0   PHOS 5.0*  --    ALB 3.7 3.9   TBILI 0.4 0.4   ALT 20 18   INR  --  1.0       Signed: Joaquin Schaefer MD

## 2020-11-08 NOTE — PROGRESS NOTES
Pt requesting to take Allegra instead of Claritin as scheduled for tomorrow. Pt has bottle at bedside. RN to send to pharmacy.

## 2020-11-08 NOTE — PROGRESS NOTES
Problem: Falls - Risk of  Goal: *Absence of Falls  Description: Document Edmond Click Fall Risk and appropriate interventions in the flowsheet. Outcome: Progressing Towards Goal  Note: Fall Risk Interventions:  Mobility Interventions: Communicate number of staff needed for ambulation/transfer, Bed/chair exit alarm    Mentation Interventions: Adequate sleep, hydration, pain control, Bed/chair exit alarm, Evaluate medications/consider consulting pharmacy    Medication Interventions: Evaluate medications/consider consulting pharmacy, Patient to call before getting OOB, Teach patient to arise slowly    Elimination Interventions: Call light in reach, Bed/chair exit alarm    History of Falls Interventions: Evaluate medications/consider consulting pharmacy         Problem: Patient Education: Go to Patient Education Activity  Goal: Patient/Family Education  Outcome: Progressing Towards Goal     Problem: Pressure Injury - Risk of  Goal: *Prevention of pressure injury  Description: Document Sylvester Scale and appropriate interventions in the flowsheet. Outcome: Progressing Towards Goal  Note: Pressure Injury Interventions:             Activity Interventions: Pressure redistribution bed/mattress(bed type)    Mobility Interventions: Pressure redistribution bed/mattress (bed type)    Nutrition Interventions: Document food/fluid/supplement intake                     Problem: Patient Education: Go to Patient Education Activity  Goal: Patient/Family Education  Outcome: Progressing Towards Goal     Problem: Patient Education: Go to Patient Education Activity  Goal: Patient/Family Education  Outcome: Progressing Towards Goal     Problem: TIA/CVA Stroke: 0-24 hours  Goal: Activity/Safety  Outcome: Progressing Towards Goal  Goal: Consults, if ordered  Outcome: Progressing Towards Goal  Goal: Diagnostic Test/Procedures  Outcome: Progressing Towards Goal  Goal: Nutrition/Diet  Outcome: Progressing Towards Goal

## 2020-11-08 NOTE — PROGRESS NOTES
Problem: Mobility Impaired (Adult and Pediatric)  Goal: *Acute Goals and Plan of Care (Insert Text)  Description: FUNCTIONAL STATUS PRIOR TO ADMISSION: Patient was independent and active without use of DME.      HOME SUPPORT: The patient lived with spouse but did not require assist.    Physical Therapy Goals  Initiated 11/8/2020  1. Patient will move from supine to sit and sit to supine , scoot up and down, and roll side to side in bed with modified independence within 7 day(s). 2.  Patient will transfer from bed to chair and chair to bed with modified independence using the least restrictive device within 7 day(s). 3.  Patient will perform sit to stand with modified independence within 7 day(s). 4.  Patient will ambulate with modified independence for 150 feet with the least restrictive device within 7 day(s). 5.  Patient will ascend/descend 4 stairs with L handrail(s) with modified independence within 7 day(s). 6.  Patient will improve Domínguez Balance score by 7 points within 7 days. Outcome: Progressing Towards Goal   PHYSICAL THERAPY EVALUATION- NEURO POPULATION  Patient: Elena Urbina (59 y.o. female)  Date: 11/8/2020  Primary Diagnosis: Seizure ([facility] [address] [R56.9]  Stroke [facility]) [I63.9]  Migraine [G43.909]       Precautions:   Fall      ASSESSMENT  Based on the objective data described below, the patient presents with impaired coordination, mild balance deficits, L sciatica and coordination deficits in LLE, and decreased activity tolerance. Pt in bed on arrival, agreeable to PT evaluation. Pt demonstrates S with bed mobility, SBA with sit>stand and cga  with transfers and ambulation without an ad. Pt with noted path deviations during ambulation and slight difficulty with stair navigation due to decreased coordination and vision issues. HHPT recommended at discharge, with transition to outpatient PT to address higher level balance and L sciatica.   Pt will be staying at her daughter's home at discharge. Current Level of Function Impacting Discharge (mobility/balance): S to CGA    Functional Outcome Measure: The patient scored Total: 29/56 on the Domínguez Balance Assessment which is indicative of moderate fall risk. Other factors to consider for discharge: fall risk     Patient will benefit from skilled therapy intervention to address the above noted impairments. PLAN :  Recommendations and Planned Interventions: bed mobility training, transfer training, gait training, neuromuscular re-education, patient and family training/education and therapeutic activities      Frequency/Duration: Patient will be followed by physical therapy:  4 times a week to address goals.     Recommendation for discharge: (in order for the patient to meet his/her long term goals)  Physical therapy at least 2 days/week in the home     This discharge recommendation:  A follow-up discussion with the attending provider and/or case management is planned    IF patient discharges home will need the following DME: none         SUBJECTIVE:   Patient stated I do feel a little wobbly    OBJECTIVE DATA SUMMARY:   HISTORY:    Past Medical History:   Diagnosis Date    Allergic rhinitis, cause unspecified 1/17/2011    Asthma     Asthma     Bladder cancer ([facility] [address] 7/7/2010    Chronic airway obstruction, not elsewhere classified 1/17/2011    Depression 1/17/2011    Diverticulosis 7/7/2010    Encounter for long-term (current) use of other medications 1/17/2011    Essential hypertension, benign 11/30/2012    Hypercholesterolemia     Mixed hyperlipidemia 1/17/2011    Sleep apnea     CPAP     Past Surgical History:   Procedure Laterality Date    ENDOSCOPY, COLON, DIAGNOSTIC      HX CARPAL TUNNEL RELEASE      right    HX CHOLECYSTECTOMY      HX CYSTOCELE REPAIR      HX HYSTERECTOMY      HX ORTHOPAEDIC      left thumb surgery    HX UROLOGICAL      resection of bladder tumor; bladder sling    REPAIR OF RECTOCELE Personal factors and/or comorbidities impacting plan of care:  Medical status    Home Situation  Home Environment: Private residence  # Steps to Enter: 4  Rails to Enter: Yes  Hand Rails : Right  One/Two Story Residence: Two story, live on 1st floor  Living Alone: No  Support Systems: Spouse/Significant Other/Partner(spouse currently in the hospital)  Patient Expects to be Discharged to[de-identified] Private residence  Current DME Used/Available at Home: Grab bars  Tub or Shower Type: Shower    EXAMINATION/PRESENTATION/DECISION MAKING:   Critical Behavior:  Neurologic State: Alert  Orientation Level: Oriented X4  Cognition: Follows commands     Hearing: Auditory  Auditory Impairment: None  Range Of Motion:  AROM: Within functional limits         Strength:    Strength: Within functional limits                    Tone & Sensation:   Tone: Normal              Sensation: Intact               Coordination:  Coordination: Generally decreased, functional  Vision:   Acuity: Impaired near vision; Impaired far vision(reports objects look concave)  Functional Mobility:  Bed Mobility:  Rolling: Supervision  Supine to Sit: Supervision     Scooting: Supervision  Transfers:  Sit to Stand: Stand-by assistance  Stand to Sit: Supervision                       Balance:   Sitting: Intact  Standing: Impaired  Standing - Static: Good; Unsupported  Standing - Dynamic : Fair;Unsupported  Ambulation/Gait Training:  Distance (ft): 110 Feet (ft)  Assistive Device: Gait belt  Ambulation - Level of Assistance: Contact guard assistance     Gait Description (WDL): Exceptions to WDL  Gait Abnormalities: Decreased step clearance; Path deviations              Speed/Jenny: Pace decreased (<100 feet/min)  Step Length: Left shortened;Right shortened                     Stairs:  Number of Stairs Trained: 13  Stairs - Level of Assistance: Contact guard assistance(verbal safety cues)   Rail Use: Left         Functional Measure  Domínguez Balance Test:    Sitting to Standing: 3  Standing Unsupported: 3  Sitting with Back Unsupported: 4  Standing to Sittin  Transfers: 3  Standing Unsupported with Eyes Closed: 1  Standing Unsupported with Feet Together: 1  Reach Forward with Outstretched Arm: 2   Object: 2  Turn to Look Over Shoulders: 2  Turn 360 Degrees: 2  Alternate Foot on Step/Stool: 1  Standing Unsupported One Foot in Front: 0  Stand on One Le  Total: 29/56         56=Maximum possible score;   0-20=High fall risk  21-40=Moderate fall risk   41-56=Low fall risk        Physical Therapy Evaluation Charge Determination   History Examination Presentation Decision-Making   MEDIUM  Complexity : 1-2 comorbidities / personal factors will impact the outcome/ POC  LOW Complexity : 1-2 Standardized tests and measures addressing body structure, function, activity limitation and / or participation in recreation  LOW Complexity : Stable, uncomplicated  LOW Complexity : FOTO score of       Based on the above components, the patient evaluation is determined to be of the following complexity level: LOW     Pain Rating:  None reported    Activity Tolerance:   Good and Fair      After treatment patient left in no apparent distress:   Sitting in chair, Call bell within reach and Caregiver / family present    COMMUNICATION/EDUCATION:   The patients plan of care was discussed with: Occupational therapist and Registered nurse. Patient was educated regarding her deficit(s) of impaired balance and coordination as this relates to her diagnosis of CVA. She demonstrated Good understanding. Patient and/or family was verbally educated on the BE FAST acronym for signs/symptoms of CVA and TIA. BE FAST was written on patient's communication board  for visual education and reinforcement. All questions answered with patient indicating good understanding.        Fall prevention education was provided and the patient/caregiver indicated understanding., Patient/family have participated as able in goal setting and plan of care. and Patient/family agree to work toward stated goals and plan of care.     Thank you for this referral.  Tram Gilbert, PT   Time Calculation: 43 mins

## 2020-11-08 NOTE — PROGRESS NOTES
* No surgery found *  * No surgery found *  Bedside shift change report given to Mireya (oncoming nurse) by Haja Alonso (offgoing nurse). Report included the following information SBAR, Kardex, ED Summary, Intake/Output, MAR and Recent Results.     Zone Phone:   [phone]    Significant changes during shift:  See progress note    Patient Information    Heavenly Joaquin  76 y.o.  11/7/2020 10:18 AM by Vandana Florian MD. Heavenly Joaquin was admitted from Home    Problem List    Patient Active Problem List    Diagnosis Date Noted    Left pontine stroke ([address] 11/08/2020    Bilateral carotid artery stenosis 11/08/2020    Diabetic peripheral neuropathy associated with type 2 diabetes mellitus ([address] 11/08/2020    Basilar artery occlusion with cerebral infarction ([address] 11/08/2020    Migraine 11/07/2020    Seizure ([address] 11/07/2020    Stroke ([address] 11/07/2020    Recurrent depression ([address] 12/31/2017    Other chest pain 10/20/2017    Simple chronic bronchitis ([address] 10/20/2017    Pulmonary nodule, right 10/20/2017    Cystitis 10/20/2017    Colon polyps 06/02/2014    Esophageal reflux 03/29/2013    Essential hypertension, benign 11/30/2012    Chronic airway obstruction, not elsewhere classified 01/17/2011    Allergic rhinitis 01/17/2011    Depression 01/17/2011    Mixed hyperlipidemia 01/17/2011    Encounter for long-term (current) use of other medications 01/17/2011    Bladder cancer ([address] 07/07/2010    Diverticulosis 07/07/2010     Past Medical History:   Diagnosis Date    Allergic rhinitis, cause unspecified 1/17/2011    Asthma     Asthma     Bladder cancer ([address] 7/7/2010    Chronic airway obstruction, not elsewhere classified 1/17/2011    Depression 1/17/2011    Diverticulosis 7/7/2010    Encounter for long-term (current) use of other medications 1/17/2011    Essential hypertension, benign 11/30/2012    Hypercholesterolemia     Mixed hyperlipidemia 1/17/2011    Sleep apnea     CPAP     Core Measures:    CVA: Yes Yes  CHF:No No  PNA:No No    Activity Status:    OOB to Chair No  Ambulated this shift No   Bed Rest Yes    LINES AND DRAINS:  PIV    DVT prophylaxis:    DVT prophylaxis Med- Yes  DVT prophylaxis SCD or GLAI- No     Wounds: (If Applicable)    Wounds- No    Patient Safety:    Falls Score Total Score: 4  Safety Level_______  Bed Alarm On? Yes  Sitter?  No    Plan for upcoming shift: MRI, eeg, echo, PT/ot/sp    Discharge Plan: No TBD    Active Consults:  IP CONSULT TO NEUROLOGY

## 2020-11-08 NOTE — PROGRESS NOTES
Bedside and Verbal shift change report given to Simon (oncoming nurse) by Chaparro Moreau (offgoing nurse). Report included the following information SBAR, Kardex, MAR and Recent Results.

## 2020-11-08 NOTE — CONSULTS
Consult  REFERRED BY:  Fabian Roman MD    CHIEF COMPLAINT: Right-sided weakness      Subjective:     Davey Hairston is a 76 y.o. right-handed  female seen as a new patient to me, at the request of Dr. Praveen Mercado of new problem of sudden weakness that occurred yesterday 4 times of weakness in the right arm, that came in with 4 different times lasting about 10 minutes to 20 minutes in duration, and for which she went to the ER. She was here visiting her  who just had bypass surgery and became so weak she fell over in the bed and could not use the phone. She was rushed to the emergency room and had a normal CT of the head, and then had an MR I of the brain that shows a small left pontine infarct, and an MR a of the brain that shows severe posterior circulation disease with an occluded basilar artery wound with reconstitution distally, a stenotic right vertebral artery and mild disease in the rest of the vessels. She had several episodes of visual auras without headaches which she would get a growing scotomata in her vision worse in the right eye than the left, that would come and go over several minutes time and went to see an eye doctor who told her she probably had ophthalmic migraine equivalents. The patient was on aspirin prior to admission so she has been started on Plavix and aspirin and Lipitor. She is a borderline diabetic and an ex-smoker and has high blood pressure, so she has 3 of the 4 major risk factors for stroke. She had no chest pain no palpitations and no other cardiac symptoms with this event. She is extremely allergic to IVP dye so a CTA was not done.     Past Medical History:   Diagnosis Date    Allergic rhinitis, cause unspecified 1/17/2011    Asthma     Asthma     Bladder cancer ([facility] [address] 7/7/2010    Chronic airway obstruction, not elsewhere classified 1/17/2011    Depression 1/17/2011    Diverticulosis 7/7/2010    Encounter for long-term (current) use of other medications 1/17/2011    Essential hypertension, benign 11/30/2012    Hypercholesterolemia     Mixed hyperlipidemia 1/17/2011    Sleep apnea     CPAP      Past Surgical History:   Procedure Laterality Date    ENDOSCOPY, COLON, DIAGNOSTIC      HX CARPAL TUNNEL RELEASE      right    HX CHOLECYSTECTOMY      HX CYSTOCELE REPAIR      HX HYSTERECTOMY      HX ORTHOPAEDIC      left thumb surgery    HX UROLOGICAL      resection of bladder tumor; bladder sling    REPAIR OF RECTOCELE       Family History   Problem Relation Age of Onset    Diabetes Mother     Heart Disease Mother     Elevated Lipids Mother     Cancer Mother         basal cell ca    Hypertension Father     Cancer Father         basal cell ca    Cancer Sister         breast    Breast Cancer Sister         46s      Social History     Tobacco Use    Smoking status: Never Smoker    Smokeless tobacco: Never Used   Substance Use Topics    Alcohol use: No     Comment: occ. wine         Current Facility-Administered Medications:     clopidogreL (PLAVIX) tablet 75 mg, 75 mg, Oral, DAILY, Keshav Jimenez MD    [START ON 11/9/2020] loratadine (CLARITIN) tablet 10 mg, 10 mg, Oral, DAILY, Geo Medellin MD    arformoterol 15 mcg/budesonide 0.5 mg neb solution, , Nebulization, BID, Daryl Figueredo MD, Stopped at 11/08/20 0900    cholecalciferol (VITAMIN D3) (1000 Units /25 mcg) tablet 1 Tab, 1,000 Units, Oral, DAILY, Daryl Figueredo MD, 1 Tab at 11/08/20 0859    clonazePAM (KlonoPIN) tablet 1 mg, 1 mg, Oral, DAILY PRN, Daryl Figueredo MD    DULoxetine (CYMBALTA) capsule 30 mg, 30 mg, Oral, DAILY, Daryl Figueredo MD, 30 mg at 11/08/20 0859    montelukast (SINGULAIR) tablet 10 mg, 10 mg, Oral, QHS, Daryl Figueredo MD, 10 mg at 11/07/20 2113    gemfibroziL (LOPID) tablet 600 mg, 600 mg, Oral, BID, Daryl Figueredo MD, 600 mg at 11/08/20 0859    acetaminophen (TYLENOL) tablet 650 mg, 650 mg, Oral, Q4H PRN **OR** acetaminophen (TYLENOL) solution 650 mg, 650 mg, Per NG tube, Q4H PRN **OR** acetaminophen (TYLENOL) suppository 650 mg, 650 mg, Rectal, Q4H PRN, Wally Lopez MD    aspirin chewable tablet 81 mg, 81 mg, Oral, DAILY, Wally Lopez MD, 81 mg at 11/08/20 0859    enoxaparin (LOVENOX) injection 40 mg, 40 mg, SubCUTAneous, Q24H, Wally Lopez MD, 40 mg at 11/07/20 1757    atenoloL (TENORMIN) tablet 25 mg, 25 mg, Oral, DAILY, Wally Lopez MD, 25 mg at 11/08/20 0859    LORazepam (ATIVAN) injection 1 mg, 1 mg, IntraVENous, Q4H PRN, Wally Lopez MD        Allergies   Allergen Reactions    Iodine Shortness of Breath    Ciprofloxacin Unknown (comments)    Hydrocodone Unknown (comments)    Lithium Unknown (comments)    Pravastatin Myalgia    Simvastatin Unknown (comments)    Wellbutrin [Bupropion Hcl] Unknown (comments)      MRI Results (most recent):  Results from [place] encounter on 11/07/20   MRA NECK WO CONT    Narrative PRELIMINARY REPORT    There is an acute infarction in the mid kelly just to the left of midline. Results called by the technologist.    MRA of the [place] demonstrates small distal right vertebral artery. There is occlusion of the mid basilar artery. There is a right posterior  communicating artery. MRA of the neck demonstrates common carotid bifurcations to be patent. Vertebral  arteries are codominant. Results called by the technologist.  Preliminary report was provided by  , the on-call radiologist, at 539    Final report to follow. END PRELIMINARY REPORT                    Results from Hospital Encounter encounter on 11/07/20   MRA NECK WO CONT    Narrative PRELIMINARY REPORT    There is an acute infarction in the mid kelly just to the left of midline. Results called by the technologist.    MRA of the [place] demonstrates small distal right vertebral artery. There is occlusion of the mid basilar artery.  There is a right posterior  communicating artery. MRA of the neck demonstrates common carotid bifurcations to be patent. Vertebral  arteries are codominant. Results called by the technologist.  Preliminary report was provided by  , the on-call radiologist, at 234    Final report to follow. END PRELIMINARY REPORT                Review of Systems:  A comprehensive review of systems was negative except for: Constitutional: positive for fatigue and malaise  Eyes: positive for visual disturbance  Musculoskeletal: positive for myalgias, arthralgias and stiff joints  Neurological: positive for speech problems, coordination problems, gait problems and weakness  Behvioral/Psych: positive for anxiety   Vitals:    11/07/20 2346 11/08/20 0344 11/08/20 0724 11/08/20 1141   BP: (!) 140/65 132/60 (!) 142/60 126/66   Pulse: 63 69 60 (!) 58   Resp: 18 17 18 18   Temp: 98.1 °F (36.7 °C) 97.9 °F (36.6 °C) 98.3 °F (36.8 °C) 98.2 °F (36.8 °C)   SpO2: 97% 96% 95% 98%   Weight:       Height:         Objective:     I      NEUROLOGICAL EXAM:    Appearance: The patient is well developed, well nourished, mildly overweight, provides a coherent history and is in no acute distress. Mental Status: Oriented to time, place and person, and the president, cognitive function is normal and speech is fluent and no aphasia or dysarthria. Mood and affect appropriate. Cranial Nerves:   Intact visual fields. Fundi are benign, disc are flat, no lesions seen on funduscopy. CARROLL, EOM's full, no nystagmus, no ptosis. Facial sensation is normal. Corneal reflexes are not tested. Facial movement is symmetric. Hearing is normal bilaterally. Palate is midline with normal sternocleidomastoid and trapezius muscles are normal. Tongue is midline. Neck without meningismus or bruits  Temporal arteries are not tender or enlarged  TMJ areas are not tender on palpation   Motor:  5/5 strength in upper and lower proximal and distal muscles. Normal bulk and tone. No fasciculations.   Rapid alternating movement is symmetric and intact bilaterally   Reflexes:   Deep tendon reflexes 2+/4 and symmetrical.  No babinski or clonus present   Sensory:   Normal to touch, pinprick and vibration and temperature, except in both feet she has mild decreased to temperature and pin and vibration to ankle level. DSS is intact   Gait:  Normal gait for patient's age. Tremor:   No tremor noted. Cerebellar:  No abnormal cerebellar signs present on Romberg and tandem testing and finger-nose-finger exam.   Neurovascular:  Normal heart sounds and regular rhythm, peripheral pulses decreased, and no carotid bruits. Assessment:       ICD-10-CM ICD-9-CM    1. Stroke-like symptom  R29.90 781.99      Active Problems:    Migraine (11/7/2020)      Seizure ([facility] [address] (11/7/2020)      Stroke ([facility] [address] (11/7/2020)      [facility] (11/8/2020)      Bilateral carotid artery stenosis (11/8/2020)      Diabetic peripheral neuropathy associated with type 2 diabetes mellitus ([facility] [address] (11/8/2020)      Basilar artery occlusion with cerebral infarction ([facility] [address] (11/8/2020)        Plan:     Patient with new problem of increasing spells of weakness on the right side associated with a left pontine infarct on MRI scan and severe vertebrobasilar disease on MRA of the posterior fossa as the most likely cause of her event. Patient has a mid basilar artery occlusion and then reconstitution distally and severe genuine vascular disease and possibly an occluded right vertebral artery or stenosis, so patient will need dual antiplatelet therapy for a while due to her high risk of vascular disease and she was already on aspirin. We advised the patient that she has 3 of the 4 main risk factors for stroke as far as diabetes, high blood pressure, cholesterol, and that the fourth and smoking she should never smoke again. We talked to the daughter at length and the patient at length.   Patient also has diabetic neuropathy in her feet, with decreased sensation in both feet, and her hemoglobin A1c was 6.2, we advised that she needs to control her blood sugars better to prevent the neuropathy from getting worse, because treating the diabetes is the single most important factor in controlling the neuropathy  Continue complete neurovascular work-up as ordered, and new PT and OT and speech, will follow with you. Signed By: Nunu Coon MD     November 8, 2020       CC:  Cinthia Grey, [address]: [phone]

## 2020-11-09 ENCOUNTER — APPOINTMENT (OUTPATIENT)
Dept: NON INVASIVE DIAGNOSTICS | Age: 75
DRG: 066 | End: 2020-11-09
Attending: INTERNAL MEDICINE
Payer: MEDICARE

## 2020-11-09 ENCOUNTER — HOME HEALTH ADMISSION (OUTPATIENT)
Dept: HOME HEALTH SERVICES | Facility: HOME HEALTH | Age: 75
End: 2020-11-09
Payer: MEDICARE

## 2020-11-09 VITALS
OXYGEN SATURATION: 97 % | BODY MASS INDEX: 33.31 KG/M2 | RESPIRATION RATE: 18 BRPM | WEIGHT: 199.96 LBS | HEART RATE: 65 BPM | DIASTOLIC BLOOD PRESSURE: 64 MMHG | SYSTOLIC BLOOD PRESSURE: 134 MMHG | TEMPERATURE: 99.3 F | HEIGHT: 65 IN

## 2020-11-09 LAB
ECHO AV AREA PEAK VELOCITY: 3.05 CM2
ECHO AV AREA/BSA PEAK VELOCITY: 1.5 CM2/M2
ECHO AV PEAK GRADIENT: 8.54 MMHG
ECHO AV PEAK VELOCITY: 146.08 CM/S
ECHO EST RA PRESSURE: 10 MMHG
ECHO LA TO AORTIC ROOT RATIO: 1.33
ECHO LV E' LATERAL VELOCITY: 9 CM/S
ECHO LV E' SEPTAL VELOCITY: 8.25 CM/S
ECHO LV INTERNAL DIMENSION DIASTOLIC: 5.24 CM (ref 3.9–5.3)
ECHO LV INTERNAL DIMENSION SYSTOLIC: 3.71 CM
ECHO LV IVSD: 1.32 CM (ref 0.6–0.9)
ECHO LV MASS 2D: 266.7 G (ref 67–162)
ECHO LV MASS INDEX 2D: 134.8 G/M2 (ref 43–95)
ECHO LV POSTERIOR WALL DIASTOLIC: 1.18 CM (ref 0.6–0.9)
ECHO LVOT DIAM: 2.22 CM
ECHO LVOT PEAK GRADIENT: 5.3 MMHG
ECHO LVOT PEAK VELOCITY: 115.1 CM/S
ECHO MV A VELOCITY: 83.97 CM/S
ECHO MV E DECELERATION TIME (DT): 307.47 MS
ECHO MV E VELOCITY: 75.85 CM/S
ECHO MV E/A RATIO: 0.9
ECHO MV E/E' LATERAL: 8.43
ECHO MV E/E' RATIO (AVERAGED): 8.81
ECHO MV E/E' SEPTAL: 9.19
ECHO PV PEAK INSTANTANEOUS GRADIENT SYSTOLIC: 2.4 MMHG
ECHO PV REGURGITANT MAX VELOCITY: 77.47 CM/S
ECHO RIGHT VENTRICULAR SYSTOLIC PRESSURE (RVSP): 30.71 MMHG
ECHO RV INTERNAL DIMENSION: 2.87 CM
ECHO TV REGURGITANT MAX VELOCITY: 227.52 CM/S
ECHO TV REGURGITANT MAX VELOCITY: 245.12 CM/S
ECHO TV REGURGITANT PEAK GRADIENT: 20.71 MMHG

## 2020-11-09 PROCEDURE — 74011250637 HC RX REV CODE- 250/637: Performed by: INTERNAL MEDICINE

## 2020-11-09 PROCEDURE — 94640 AIRWAY INHALATION TREATMENT: CPT

## 2020-11-09 PROCEDURE — 93306 TTE W/DOPPLER COMPLETE: CPT | Performed by: INTERNAL MEDICINE

## 2020-11-09 PROCEDURE — 74011000250 HC RX REV CODE- 250: Performed by: INTERNAL MEDICINE

## 2020-11-09 PROCEDURE — 99233 SBSQ HOSP IP/OBS HIGH 50: CPT | Performed by: PSYCHIATRY & NEUROLOGY

## 2020-11-09 PROCEDURE — 93306 TTE W/DOPPLER COMPLETE: CPT

## 2020-11-09 RX ORDER — CLOPIDOGREL BISULFATE 75 MG/1
75 TABLET ORAL DAILY
Qty: 30 TAB | Refills: 2 | Status: SHIPPED | OUTPATIENT
Start: 2020-11-10 | End: 2020-12-01 | Stop reason: SDUPTHER

## 2020-11-09 RX ADMIN — GEMFIBROZIL 600 MG: 600 TABLET ORAL at 09:00

## 2020-11-09 RX ADMIN — ASPIRIN 81 MG CHEWABLE TABLET 81 MG: 81 TABLET CHEWABLE at 08:52

## 2020-11-09 RX ADMIN — DULOXETINE HYDROCHLORIDE 30 MG: 30 CAPSULE, DELAYED RELEASE ORAL at 08:53

## 2020-11-09 RX ADMIN — ATENOLOL 25 MG: 25 TABLET ORAL at 08:52

## 2020-11-09 RX ADMIN — VITAMIN D, TAB 1000IU (100/BT) 1 TABLET: 25 TAB at 08:52

## 2020-11-09 RX ADMIN — Medication 180 MG: at 08:57

## 2020-11-09 RX ADMIN — CLOPIDOGREL BISULFATE 75 MG: 75 TABLET ORAL at 08:53

## 2020-11-09 RX ADMIN — ARFORMOTEROL TARTRATE: 15 SOLUTION RESPIRATORY (INHALATION) at 08:33

## 2020-11-09 NOTE — PROGRESS NOTES
LES Plan:    * Home with [facility] (PT) & f/u apts    > CM confirmed pt has PCP f/u apt secured for d/c; details in AVS  > List of BS primary care & specialty providers provided at beside  > Pt's daughter to provide transport at d/c    1:30 PM: CM confirmed pt is agreeable to d/c plan. Pt requested list of BS primary care providers and speciality providers for review; CM provided requested information at bedside. Pt reported no additional questions, concerns, or needs related to d/c plan. Pt's daughter will provide transportation at d/c. No further CM needs identified. CM notified pt's nurse of d/c.    11:22 AM: CM received update that [facility] is able to provide requested [place] services at d/c; CM placed [facility]'s information in pt's AVS for reference. Initial note: CM acknowledged d/c. CM reviewed pt's chart prior to moving forward with d/c planning. CM met with pt at bedside to check in, introduce role, and discuss LES plan for d/c re: HH (PT). Upon conducting room visit, pt presented as A&O x4. CM confirmed pt is agreeable to utilizing [place] (PT) intervention at d/c. CM inquired if pt had preferred provider of [place] services; pt requested for referral to be sent to [facility]. for review. FOC offered, verbal consent provided. Copy of [address] on chart. Pt reported she will be d/c to her daughter (Pat Giraldo: [phone]) address: [address] [place] [address], [address] [place] [address]. CM is actively working to solidify [facility] plan for d/c. CM will report updates as they become available. Medicare pt has received, reviewed, and provided verbal consent for 2nd IM letter informing them of their right to appeal the discharge. Copy has been placed on pt bedside chart. Pt is waiting for Echo to be completed prior to d/c. CM entered delay in d/c to reflect current barriers preventing pt from transitioning out of [address] Care Management Interventions  PCP Verified by CM:  Yes  Palliative Care Criteria Met (RRAT>21 & CHF Dx)?: No  Mode of Transport at Discharge:  Other (see comment)(pt's daughter to provide transportation at d/c)  Transition of Care Consult (CM Consult): Discharge Planning, [address]: Yes  Discharge Durable Medical Equipment: No  Physical Therapy Consult: Yes  Occupational Therapy Consult: Yes  Speech Therapy Consult: Yes  Current Support Network: Lives with Spouse, Own Home(Pt lives with her )  Confirm Follow Up Transport: Family  The Plan for Transition of Care is Related to the Following Treatment Goals : HH (PT)  The Patient and/or Patient Representative was Provided with a Choice of Provider and Agrees with the Discharge Plan?: Yes  Name of the Patient Representative Who was Provided with a Choice of Provider and Agrees with the Discharge Plan: Patient Thania Pitts)  [place] of Choice List was Provided with Basic Dialogue that Supports the Patient's Individualized Plan of Care/Goals, Treatment Preferences and Shares the Quality Data Associated with the Providers?: Yes  [place] Resource Information Provided?: No  Discharge Location  Discharge Placement: Home with home health([facility] (PT) & f/u apts)      Arlen Bumpers, [address] [place] [address], [address] [place] [address]

## 2020-11-09 NOTE — PROGRESS NOTES
Speech pathology  Orders received, chart reviewed, spoke with RN, patient and her daughter. Patient reports speech is at baseline. It is clear and fluent. She passed STAND on admission and has been tolerating a regular diet/ thin liquids and meds whole all weekend without issue. Observed patient with successive straw sips of thin via water jug which she tolerated without issue. Plans for patient to d/c today. Further evaluation not indicated at this time. Will complete orders.    Thanks,   Keith Thomas M.S. CCC-SLP

## 2020-11-09 NOTE — PROGRESS NOTES
Patient and family were given discharge instructions, plan of care and medication regime. All questions were answered and patient is ready for discharge.

## 2020-11-09 NOTE — DISCHARGE SUMMARY
Hospitalist Discharge Summary     Patient ID:  Mik Velarde  [id number]  76 y.o.  1945  11/7/2020    PCP on record: Sarah Rinaldi MD    Admit date: 11/7/2020  Discharge date and time: 11/9/2020    DISCHARGE DIAGNOSIS:  Acute ischemic CVA in the left kelly POA   Hypertension  Depression  Asthma        CONSULTATIONS:  IP CONSULT TO NEUROLOGY    Excerpted HPI from H&P of Lulu Chan MD:    Rex Berman is a 76 y.o.  female with past medical history of asthma, depression, hypertension, hyperlipidemia, RYAN on CPAP who presents with sudden onset of unsteady gait this morning associated with impression of floppy right arm which she could not control. She reported before her episode of floppy right arm she was feeling numbness across her mouth, she also reported slurred speech. patient had a total of 5 episodes of the symptoms today. 2 of those episodes were witnessed in the ED .during those episodes she will hold her breath and and not able to breathe . patient reported that last week he had during the whole week episode of ocular migraines which starts by blurry vision and seeing discs which lasts about 5 minutes. she saw her primary care physician who qualify those episodes as ocular migraine. She denies any headaches, nausea or vomiting, focal deficit  In the ED, her vital signs showed that she was slightly bradycardic, soft blood pressure  Her labs are unremarkable  Was evaluated by telemetry neurologist who did not retain any indication for TPA because of the intermittent symptoms   We were asked to admit for work up and evaluation of the above problems. ______________________________________________________________________  DISCHARGE SUMMARY/HOSPITAL COURSE:  for full details see H&P, daily progress notes, labs, consult notes.        Acute ischemic CVA in the left kelly POA  Patient presented with unsteady gait and left upper extremity weakness  CT head was unremarkable, MRI of the brain showed left pontine ischemic CVA  MRI showed posterior vertebral artery occlusion  Patient reports her symptoms have resolved  No arrhythmia noticed on telemetry  Echocardiogram showed  · LV: Estimated LVEF is 55 - 60%. Visually measured ejection fraction. Normal cavity size and systolic function (ejection fraction normal). Mild concentric hypertrophy. · TV: Mild tricuspid valve regurgitation is present. · PA: Pulmonary arterial systolic pressure is 30 mmHg. · Pericardium: Pericardial fat pad present. · Saline contrast was given to evaluate for intracardiac shunt. Continue aspirin, add Plavix as recommended by neurology  She is not on statin due to intolerance  Hemoglobin A1c 6.2  Blood pressure control with a goal of less than 289 systolic  Continue atenolol, blood pressure currently is at goal  Discharge home with home health     Hypertension  Depression  Asthma  Continue with atenolol with holding parameters  Continue with Cymbalta  Continue with montelukast      _______________________________________________________________________  Patient seen and examined by me on discharge day. Pertinent Findings:  Gen:    Not in distress  Chest: Clear lungs  CVS:   Regular rhythm. No edema  Abd:  Soft, not distended, not tender  Neuro:  Alert, oriented x4  _______________________________________________________________________  DISCHARGE MEDICATIONS:   Current Discharge Medication List      START taking these medications    Details   clopidogreL (PLAVIX) 75 mg tab Take 1 Tab by mouth daily. Qty: 30 Tab, Refills: 2         CONTINUE these medications which have NOT CHANGED    Details   gemfibroziL (LOPID) 600 mg tablet TAKE 1 TABLET BY MOUTH  TWICE DAILY  Qty: 180 Tab, Refills: 3    Comments: Requesting 1 year supply      atenoloL (TENORMIN) 50 mg tablet TAKE 1.5 TABLET BY MOUTH  DAILY.  (Appointment needs to be made for further refills)  Qty: 45 Tab, Refills: 3    Associated Diagnoses: Essential hypertension, benign      cholecalciferol (Vitamin D3) (1000 Units /25 mcg) tablet Take 1,000 Units by mouth daily. budesonide-formoterol (SYMBICORT) 160-4.5 mcg/actuation HFAA Take 2 Puffs by inhalation two (2) times a day. Qty: 3 Inhaler, Refills: 3    Associated Diagnoses: Asthma, unspecified asthma severity, unspecified whether complicated, unspecified whether persistent      DULoxetine (CYMBALTA) 30 mg capsule TAKE 1 CAPSULE BY MOUTH  DAILY  Qty: 90 Cap, Refills: 3      montelukast (SINGULAIR) 10 mg tablet TAKE 1 TABLET BY MOUTH  DAILY  Qty: 90 Tab, Refills: 3      clonazePAM (KLONOPIN) 1 mg tablet Take 1 Tab by mouth daily as needed (anxiety). Qty: 90 Tab, Refills: 3    Associated Diagnoses: Depression, unspecified depression type      omega-3 fatty acids-vitamin e (Fish Oil) 1,000 mg cap Take 1 Cap by mouth two (2) times a day. Associated Diagnoses: Mixed hyperlipidemia      lysine (L-LYSINE) 500 mg Tab tablet Take 500 mg by mouth as needed. Associated Diagnoses: Mixed hyperlipidemia      aspirin 81 mg chewable tablet Take 81 mg by mouth daily. Associated Diagnoses: Essential hypertension, benign      fexofenadine (ALLEGRA) 180 mg tablet Take 180 mg by mouth daily. diclofenac EC (VOLTAREN) 75 mg EC tablet Take 1 Tab by mouth two (2) times daily (with meals). Qty: 60 Tab, Refills: 1    Associated Diagnoses: Chronic left shoulder pain               Patient Follow Up Instructions:    Activity: Activity as tolerated  Diet: Cardiac Diet  Wound Care: None needed    Follow-up with PCP and neurology in 1-2 weeks  Follow-up tests/labs none  Follow-up Information     Follow up With Specialties Details Why Contact Info    Tomas Mchugh MD Family Medicine In 1 week  [address]  [phone]      Milton Gamboa MD Neurology In 1 week  [address]  [address] [place] [address]  [phone] ________________________________________________________________    Risk of deterioration: High    Condition at Discharge:  Stable  __________________________________________________________________    Disposition  Home with family and home health services    ____________________________________________________________________    Code Status: Full Code  ___________________________________________________________________      Total time in minutes spent coordinating this discharge (includes going over instructions, follow-up, prescriptions, and preparing report for sign off to her PCP) :  40 minutes    Signed:  Joaquin Schaefer MD

## 2020-11-09 NOTE — PROGRESS NOTES
Problem: Falls - Risk of  Goal: *Absence of Falls  Description: Document Gris Le Fall Risk and appropriate interventions in the flowsheet. Outcome: Progressing Towards Goal  Note: Fall Risk Interventions:  Mobility Interventions: Bed/chair exit alarm, Patient to call before getting OOB    Mentation Interventions: Adequate sleep, hydration, pain control, Bed/chair exit alarm    Medication Interventions: Bed/chair exit alarm, Patient to call before getting OOB    Elimination Interventions: Bed/chair exit alarm, Call light in reach    History of Falls Interventions: Bed/chair exit alarm, Door open when patient unattended         Problem: Patient Education: Go to Patient Education Activity  Goal: Patient/Family Education  Outcome: Progressing Towards Goal     Problem: Pressure Injury - Risk of  Goal: *Prevention of pressure injury  Description: Document Sylvester Scale and appropriate interventions in the flowsheet. Outcome: Progressing Towards Goal  Note: Pressure Injury Interventions:             Activity Interventions: Increase time out of bed    Mobility Interventions: PT/OT evaluation    Nutrition Interventions: Offer support with meals,snacks and hydration                     Problem: Patient Education: Go to Patient Education Activity  Goal: Patient/Family Education  Outcome: Progressing Towards Goal     Problem: TIA/CVA Stroke: Day 2 Until Discharge  Goal: Activity/Safety  Outcome: Progressing Towards Goal  Goal: Diagnostic Test/Procedures  Outcome: Progressing Towards Goal  Goal: Nutrition/Diet  Outcome: Progressing Towards Goal  Goal: Discharge Planning  Outcome: Progressing Towards Goal  Goal: Medications  Outcome: Progressing Towards Goal

## 2020-11-09 NOTE — PROGRESS NOTES
* No surgery found *  * No surgery found *  Bedside shift change report given to Benson Schlatter, RN  (oncoming nurse) by Simon (offgoing nurse).  Report included the following information SBAR, Kardex, ED Summary, Intake/Output, MAR and Recent Results.     Zone Phone:   [phone]     Significant changes during shift:  none      Patient Information     Licha Quiñones  76 y.o.  11/7/2020 10:18 AM by Areli Dolan MD. Licha Quiñones was admitted from Home     Problem List          Patient Active Problem List     Diagnosis Date Noted    Left pontine stroke ([address] 11/08/2020    Bilateral carotid artery stenosis 11/08/2020    Diabetic peripheral neuropathy associated with type 2 diabetes mellitus ([address] 11/08/2020    Basilar artery occlusion with cerebral infarction ([address] 11/08/2020    Migraine 11/07/2020    Seizure ([address] 11/07/2020    Stroke ([address] 11/07/2020    Recurrent depression ([address] 12/31/2017    Other chest pain 10/20/2017    Simple chronic bronchitis ([address] 10/20/2017    Pulmonary nodule, right 10/20/2017    Cystitis 10/20/2017    Colon polyps 06/02/2014    Esophageal reflux 03/29/2013    Essential hypertension, benign 11/30/2012    Chronic airway obstruction, not elsewhere classified 01/17/2011    Allergic rhinitis 01/17/2011    Depression 01/17/2011    Mixed hyperlipidemia 01/17/2011    Encounter for long-term (current) use of other medications 01/17/2011    Bladder cancer ([address] 07/07/2010    Diverticulosis 07/07/2010           Past Medical History:   Diagnosis Date    Allergic rhinitis, cause unspecified 1/17/2011    Asthma      Asthma      Bladder cancer ([address] 7/7/2010    Chronic airway obstruction, not elsewhere classified 1/17/2011    Depression 1/17/2011    Diverticulosis 7/7/2010    Encounter for long-term (current) use of other medications 1/17/2011    Essential hypertension, benign 11/30/2012    Hypercholesterolemia      Mixed hyperlipidemia 1/17/2011    Sleep apnea       CPAP      Core Measures:     CVA: Yes Yes  CHF:No No  PNA:No No     Activity Status:     OOB to Chair No  Ambulated this shift No   Bed Rest Yes     LINES AND DRAINS:  PIV     DVT prophylaxis:     DVT prophylaxis Med- Yes  DVT prophylaxis SCD or GALI- No      Wounds: (If Applicable)     Wounds- No     Patient Safety:     Falls Score Total Score: 4  Safety Level_______  Bed Alarm On? Yes  Sitter?  No     Plan for upcoming shift: echo, slp     Discharge Plan: No TBD     Active Consults:  IP CONSULT TO NEUROLOGY

## 2020-11-09 NOTE — DISCHARGE INSTRUCTIONS
HOSPITALIST DISCHARGE INSTRUCTIONS    NAME: Nathan Avalos   :  1945   MRN:  [id number]     Date/Time:  2020 9:17 AM    ADMIT DATE: 2020     DISCHARGE DATE: 2020     DISCHARGE DIAGNOSIS:  Acute Ischemic CVA in the left kelly    MEDICATIONS:  · It is important that you take the medication exactly as they are prescribed. · Keep your medication in the bottles provided by the pharmacist and keep a list of the medication names, dosages, and times to be taken in your wallet. · Do not take other medications without consulting your doctor. Pain Management: per above medications    What to do at Home    Recommended diet:  Diabetic Diet    Recommended activity: Activity as tolerated    If you have questions regarding the hospital related prescriptions or hospital related issues please call [facility] carrie Hdez at [phone]. If you experience any of the following symptoms then please call your primary care physician or return to the emergency room if you cannot get hold of your doctor:  Fever, chills, nausea, vomiting, diarrhea, change in mentation, falling, bleeding, shortness of breath. Follow Up:  Dr. Marcello frederick, Raford Bumpers, MD  you are to call and set up an appointment to see them in 7-10 days. Information obtained by :  I understand that if any problems occur once I am at home I am to contact my physician. I understand and acknowledge receipt of the instructions indicated above.                                                                                                                                            Physician's or R.N.'s Signature                                                                  Date/Time                                                                                                                                              Patient or Representative Signature Date/Time

## 2020-11-10 ENCOUNTER — PATIENT OUTREACH (OUTPATIENT)
Dept: CASE MANAGEMENT | Age: 75
End: 2020-11-10

## 2020-11-10 ENCOUNTER — HOME CARE VISIT (OUTPATIENT)
Dept: SCHEDULING | Facility: HOME HEALTH | Age: 75
End: 2020-11-10
Payer: MEDICARE

## 2020-11-10 PROCEDURE — 3331090002 HH PPS REVENUE DEBIT

## 2020-11-10 PROCEDURE — 400013 HH SOC

## 2020-11-10 PROCEDURE — 3331090001 HH PPS REVENUE CREDIT

## 2020-11-10 PROCEDURE — G0151 HHCP-SERV OF PT,EA 15 MIN: HCPCS

## 2020-11-10 NOTE — PROGRESS NOTES
Consult  REFERRED BY:  Nanette Huston MD    CHIEF COMPLAINT: Right-sided weakness      Subjective:     Jung Herrera is a 76 y.o. right-handed  female seen at the request of Dr. Alona Mccoy of new problem of sudden weakness that occurred yesterday 4 times of weakness in the right arm, that came in with 4 different times lasting about 10 minutes to 20 minutes in duration, and for which she went to the ER. She was here visiting her  who just had bypass surgery and became so weak she fell over in the bed and could not use the phone. She was rushed to the emergency room and had a normal CT of the head, and then had an MR I of the brain that shows a small left pontine infarct, and an MR a of the brain that shows severe posterior circulation disease with an occluded basilar artery wound with reconstitution distally, a stenotic right vertebral artery and mild disease in the rest of the vessels. She had several episodes of visual auras without headaches which she would get a growing scotomata in her vision worse in the right eye than the left, that would come and go over several minutes time and went to see an eye doctor who told her she probably had ophthalmic migraine equivalents. The patient was on aspirin prior to admission so she has been started on Plavix and aspirin and Lipitor. She is a borderline diabetic and an ex-smoker and has high blood pressure, so she has 3 of the 4 major risk factors for stroke. She had no chest pain no palpitations and no other cardiac symptoms with this event. She is extremely allergic to IVP dye so a CTA was not done. Patient has remained stable, no recurrent symptoms, on dual antiplatelet therapy and high-dose statin, and working on her latent diabetes also. Patient anxious to go home, and discharge is being planned. We will follow-up in stroke clinic in 2 to 4 weeks time, in the office. Discussed with the hospitalist and the patient and her daughter.   Did advise him that she did indeed have a stroke in the left brainstem, and she has significant cerebrovascular disease that puts her at risk for recurrent disease, and she needs to modify her risk factors as far as blood pressure, diabetes, cholesterol, and she does not smoke, so hopefully she can control those with her PCP. She also was advised of the warning signs of stroke as far as be fast, as balance, eyes, face, arms, sensory in time, and to seek medical help immediately if there is any further problem. Past Medical History:   Diagnosis Date    Allergic rhinitis, cause unspecified 1/17/2011    Asthma     Asthma     Bl[facility] [address] 7/7/2010    Chronic airway obstruction, not elsewhere classified 1/17/2011    Depression 1/17/2011    Diverticulosis 7/7/2010    Encounter for long-term (current) use of other medications 1/17/2011    Essential hypertension, benign 11/30/2012    Hypercholesterolemia     Mixed hyperlipidemia 1/17/2011    Sleep apnea     CPAP      Past Surgical History:   Procedure Laterality Date    ENDOSCOPY, COLON, DIAGNOSTIC      HX CARPAL TUNNEL RELEASE      right    HX CHOLECYSTECTOMY      HX CYSTOCELE REPAIR      HX HYSTERECTOMY      HX ORTHOPAEDIC      left thumb surgery    HX UROLOGICAL      resection of bladder tumor; bladder sling    REPAIR OF RECTOCELE       Family History   Problem Relation Age of Onset    Diabetes Mother     Heart Disease Mother     Elevated Lipids Mother     Cancer Mother         basal cell ca    Hypertension Father     Cancer Father         basal cell ca    Cancer Sister         breast    Breast Cancer Sister         46s      Social History     Tobacco Use    Smoking status: Never Smoker    Smokeless tobacco: Never Used   Substance Use Topics    Alcohol use: No     Comment: occ. wine       No current facility-administered medications for this encounter.      Current Outpatient Medications:     [START ON 11/10/2020] clopidogreL (PLAVIX) 75 mg tab, Take 1 Tab by mouth daily. , Disp: 30 Tab, Rfl: 2    gemfibroziL (LOPID) 600 mg tablet, TAKE 1 TABLET BY MOUTH  TWICE DAILY, Disp: 180 Tab, Rfl: 3    atenoloL (TENORMIN) 50 mg tablet, TAKE 1.5 TABLET BY MOUTH  DAILY. (Appointment needs to be made for further refills), Disp: 45 Tab, Rfl: 3    cholecalciferol (Vitamin D3) (1000 Units /25 mcg) tablet, Take 1,000 Units by mouth daily. , Disp: , Rfl:     budesonide-formoterol (SYMBICORT) 160-4.5 mcg/actuation HFAA, Take 2 Puffs by inhalation two (2) times a day., Disp: 3 Inhaler, Rfl: 3    DULoxetine (CYMBALTA) 30 mg capsule, TAKE 1 CAPSULE BY MOUTH  DAILY, Disp: 90 Cap, Rfl: 3    montelukast (SINGULAIR) 10 mg tablet, TAKE 1 TABLET BY MOUTH  DAILY, Disp: 90 Tab, Rfl: 3    clonazePAM (KLONOPIN) 1 mg tablet, Take 1 Tab by mouth daily as needed (anxiety). , Disp: 90 Tab, Rfl: 3    omega-3 fatty acids-vitamin e (Fish Oil) 1,000 mg cap, Take 1 Cap by mouth two (2) times a day., Disp: , Rfl:     lysine (L-LYSINE) 500 mg Tab tablet, Take 500 mg by mouth as needed. , Disp: , Rfl:     aspirin 81 mg chewable tablet, Take 81 mg by mouth daily. , Disp: , Rfl:     fexofenadine (ALLEGRA) 180 mg tablet, Take 180 mg by mouth daily. , Disp: , Rfl:     diclofenac EC (VOLTAREN) 75 mg EC tablet, Take 1 Tab by mouth two (2) times daily (with meals). , Disp: 60 Tab, Rfl: 1        Allergies   Allergen Reactions    Iodine Shortness of Breath    Ciprofloxacin Unknown (comments)    Hydrocodone Unknown (comments)    Lithium Unknown (comments)    Pravastatin Myalgia    Simvastatin Unknown (comments)    Wellbutrin [Bupropion Hcl] Unknown (comments)      MRI Results (most recent):  Results from Hospital Encounter encounter on 11/07/20   MRA NECK WO CONT    Narrative PRELIMINARY REPORT    There is an acute infarction in the mid kelly just to the left of midline.   Results called by the technologist.    MRA of the [place] demonstrates small distal right vertebral artery. There is occlusion of the mid basilar artery. There is a right posterior  communicating artery. MRA of the neck demonstrates common carotid bifurcations to be patent. Vertebral  arteries are codominant. Results called by the technologist.  Preliminary report was provided by  , the on-call radiologist, at [phone]    Final report to follow. END PRELIMINARY REPORT                    Results from Hospital Encounter encounter on 11/07/20   MRA NECK WO CONT    Narrative PRELIMINARY REPORT    There is an acute infarction in the mid kelly just to the left of midline. Results called by the technologist.    MRA of the [place] demonstrates small distal right vertebral artery. There is occlusion of the mid basilar artery. There is a right posterior  communicating artery. MRA of the neck demonstrates common carotid bifurcations to be patent. Vertebral  arteries are codominant. Results called by the technologist.  Preliminary report was provided by  , the on-call radiologist, at [phone]    Final report to follow. END PRELIMINARY REPORT                Review of Systems:  A comprehensive review of systems was negative except for: Constitutional: positive for fatigue and malaise  Eyes: positive for visual disturbance  Musculoskeletal: positive for myalgias, arthralgias and stiff joints  Neurological: positive for speech problems, coordination problems, gait problems and weakness  Behvioral/Psych: positive for anxiety   Vitals:    11/09/20 0814 11/09/20 0833 11/09/20 1100 11/09/20 1127   BP: (!) 127/54  (!) 127/54 134/64   Pulse: 67   65   Resp: 18   18   Temp: 98 °F (36.7 °C)   99.3 °F (37.4 °C)   SpO2: 93% 93%  97%   Weight:   199 lb 15.3 oz (90.7 kg)    Height:   5' 5\" (1.651 m)      Objective:     I      NEUROLOGICAL EXAM:    Appearance: The patient is well developed, well nourished, mildly overweight, provides a coherent history and is in no acute distress.    Mental Status: Oriented to time, place and person, and the president, cognitive function is normal and speech is fluent and no aphasia or dysarthria. Mood and affect appropriate. Cranial Nerves:   Intact visual fields. Fundi are benign, disc are flat, no lesions seen on funduscopy. CARROLL, EOM's full, no nystagmus, no ptosis. Facial sensation is normal. Corneal reflexes are not tested. Facial movement is symmetric. Hearing is normal bilaterally. Palate is midline with normal sternocleidomastoid and trapezius muscles are normal. Tongue is midline. Neck without meningismus or bruits  Temporal arteries are not tender or enlarged  TMJ areas are not tender on palpation   Motor:  5/5 strength in upper and lower proximal and distal muscles. Normal bulk and tone. No fasciculations. Rapid alternating movement is symmetric and intact bilaterally   Reflexes:   Deep tendon reflexes 2+/4 and symmetrical.  No babinski or clonus present   Sensory:   Normal to touch, pinprick and vibration and temperature, except in both feet she has mild decreased to temperature and pin and vibration to ankle level. DSS is intact   Gait:  Normal gait for patient's age. Tremor:   No tremor noted. Cerebellar:  No abnormal cerebellar signs present on Romberg and tandem testing and finger-nose-finger exam.   Neurovascular:  Normal heart sounds and regular rhythm, peripheral pulses decreased, and no carotid bruits. Assessment:       ICD-10-CM ICD-9-CM    1.  Stroke-like symptom  R29.90 781.99      Active Problems:    Migraine (11/7/2020)      Seizure ([address] (11/7/2020)      Stroke ([address] (11/7/2020)      Left pontine stroke ([address] (11/8/2020)      Bilateral carotid artery stenosis (11/8/2020)      Diabetic peripheral neuropathy associated with type 2 diabetes mellitus ([address] (11/8/2020)      Basilar artery occlusion with cerebral infarction ([address] (11/8/2020)        Plan:     Patient with new problem of increasing spells of weakness on the right side associated with a left pontine infarct on MRI scan and severe vertebrobasilar disease on MRA of the posterior fossa as the most likely cause of her event. Patient has a mid basilar artery occlusion and then reconstitution distally and severe genuine vascular disease and possibly an occluded right vertebral artery or stenosis, so patient will need dual antiplatelet therapy for a while due to her high risk of vascular disease and she was already on aspirin. We advised the patient that she has 3 of the 4 main risk factors for stroke as far as diabetes, high blood pressure, cholesterol, and that the fourth and smoking she should never smoke again. We talked to the daughter at length and the patient at length. Patient also has diabetic neuropathy in her feet, with decreased sensation in both feet, and her hemoglobin A1c was 6.2, we advised that she needs to control her blood sugars better to prevent the neuropathy from getting worse, because treating the diabetes is the single most important factor in controlling the neuropathy  Continue complete neurovascular work-up as ordered, and new PT and OT and speech, will follow with you. Patient has remained stable, no recurrent symptoms, on dual antiplatelet therapy and high-dose statin, and working on her latent diabetes also. Patient anxious to go home, and discharge is being planned. We will follow-up in stroke clinic in 2 to 4 weeks time, in the office. Discussed with the hospitalist and the patient and her daughter. Did advise him that she did indeed have a stroke in the left brainstem, and she has significant cerebrovascular disease that puts her at risk for recurrent disease, and she needs to modify her risk factors as far as blood pressure, diabetes, cholesterol, and she does not smoke, so hopefully she can control those with her PCP.   She also was advised of the warning signs of stroke as far as be fast, as balance, eyes, face, arms, sensory in time, and to seek medical help immediately if there is any further problem. Signed By: Alex Hurt MD     November 9, 2020       CC:  Fei Manriquez, [address]: [phone]

## 2020-11-10 NOTE — PROGRESS NOTES
Patient was admitted to [facility] on 2020 and discharged on 2020 for migraine. Outreach made within 2 business days of discharge: Yes    Top Discharge Challenges to be reviewed by the provider   Additional needs identified to be addressed with provider no  none  Discussed COVID-19 related testing which was not done at this time. Test results were not done. Patient informed of results, if available? Method of communication with provider : none       Advance Care Planning:   Does patient have an Advance Directive:  currently not on file; education provided Patient reports she has one. CTN requested copy to be scanned into EMR. Patient verbalized understanding    Inpatient Readmission Risk score:   Was this a readmission? no     Patients top risk factors for readmission: medical condition and PCP relationship  Interventions to address risk factors: close follow up with PCP    Care Transition Nurse (CTN) contacted the patient by telephone to perform post hospital discharge assessment. Verified name and  with patient as identifiers. Provided introduction to self, and explanation of the CTN role. Patient reports she feels well. Denies slurred speech, floppy arm. Tolerating PO. Reports her spouse is currently hospitalized s/p CABGx 4 and needing a kidney transplant. CTN reviewed discharge instructions, medical action plan and red flags with patient who verbalized understanding. Patient given an opportunity to ask questions and does not have any further questions or concerns at this time. The patient agrees to contact the PCP office for questions related to their healthcare. Medication reconciliation was performed with patient, who verbalizes understanding of administration of home medications. Advised obtaining a 90-day supply of all daily and as-needed medications.    Voltaren 75 mg tab removed from med list    Referral to Pharm D needed: no     Home Health/Outpatient orders at discharge: home health care and [facility]  Date of initial visit: 11/10/2020    Durable Medical Equipment ordered at discharge: None    Covid Risk Education    Patient has following risk factors of: asthma, diabetes and HTN. Education provided regarding infection prevention, and signs and symptoms of COVID-19 and when to seek medical attention with patient who verbalized understanding. Discussed exposure protocols and quarantine From CDC: Are you at higher risk for severe illness?  and given an opportunity for questions and concerns. The patient agrees to contact PCP office for questions related to COVID-19. For more information on steps you can take to protect yourself, see CDC's How to Protect Yourself     Discussed follow-up appointments. If no appointment was previously scheduled, appointment scheduling offered: yes  [facility][1] follow up appointment(s): Patient is currently residing with her daughter in [place] and is seeking a new patient appt with Dr. Verna Madden. CTN contacted Dr. Rudy Bolden office. Spoke to [facility]. Reports Dr. Rudy Bolden , Tomas Jiang, will contact patient to arrange for new patient appt. Left message for patient regarding the above information. Future Appointments   Date Time Provider Bora Marin   11/16/2020 11:00 AM Ni Buitrago MD [facility]     Non-[facility] follow up appointment(s): NA  Plan for follow-up call in 10-14 days based on severity of symptoms and risk factors. CTN provided contact information for future needs. Goals Addressed                 This Visit's Progress     Attend follow up appointments on schedule        Prevent complications post hospitalization.  Supportive resources in place to maintain patient in the community (ie.  Home Health)

## 2020-11-11 ENCOUNTER — TELEPHONE (OUTPATIENT)
Dept: FAMILY MEDICINE CLINIC | Age: 75
End: 2020-11-11

## 2020-11-11 PROCEDURE — 3331090001 HH PPS REVENUE CREDIT

## 2020-11-11 PROCEDURE — 3331090002 HH PPS REVENUE DEBIT

## 2020-11-11 NOTE — TELEPHONE ENCOUNTER
----- Message from [email or web address] sent at 11/11/2020 12:23 PM EST -----  Regarding: MD Ar/Telephone  General Message/Vendor Calls    Caller's first and last name: Latanya Yun, daughter. Reason for call: LES appt. Callback required yes/no and why: Yes. Best contact number(s): [phone]      Details to clarify the request: Daughter called to schedule LES appt for pt and was not aware that an appt was already made for her on Mon 11/16. Would like to know if they are able to do LES virtually.       MikeMaxta

## 2020-11-12 ENCOUNTER — HOME CARE VISIT (OUTPATIENT)
Dept: SCHEDULING | Facility: HOME HEALTH | Age: 75
End: 2020-11-12
Payer: MEDICARE

## 2020-11-12 VITALS
SYSTOLIC BLOOD PRESSURE: 118 MMHG | DIASTOLIC BLOOD PRESSURE: 70 MMHG | TEMPERATURE: 97.8 F | RESPIRATION RATE: 18 BRPM | HEART RATE: 70 BPM | OXYGEN SATURATION: 94 %

## 2020-11-12 PROCEDURE — 3331090001 HH PPS REVENUE CREDIT

## 2020-11-12 PROCEDURE — 3331090002 HH PPS REVENUE DEBIT

## 2020-11-12 PROCEDURE — G0151 HHCP-SERV OF PT,EA 15 MIN: HCPCS

## 2020-11-13 PROCEDURE — 3331090002 HH PPS REVENUE DEBIT

## 2020-11-13 PROCEDURE — 3331090001 HH PPS REVENUE CREDIT

## 2020-11-14 PROCEDURE — 3331090001 HH PPS REVENUE CREDIT

## 2020-11-14 PROCEDURE — 3331090002 HH PPS REVENUE DEBIT

## 2020-11-15 PROCEDURE — 3331090002 HH PPS REVENUE DEBIT

## 2020-11-15 PROCEDURE — 3331090001 HH PPS REVENUE CREDIT

## 2020-11-16 ENCOUNTER — HOME CARE VISIT (OUTPATIENT)
Dept: SCHEDULING | Facility: HOME HEALTH | Age: 75
End: 2020-11-16
Payer: MEDICARE

## 2020-11-16 PROCEDURE — G0151 HHCP-SERV OF PT,EA 15 MIN: HCPCS

## 2020-11-16 PROCEDURE — 3331090002 HH PPS REVENUE DEBIT

## 2020-11-16 PROCEDURE — 3331090001 HH PPS REVENUE CREDIT

## 2020-11-17 ENCOUNTER — VIRTUAL VISIT (OUTPATIENT)
Dept: FAMILY MEDICINE CLINIC | Age: 75
End: 2020-11-17
Payer: MEDICARE

## 2020-11-17 VITALS
TEMPERATURE: 97.8 F | HEART RATE: 53 BPM | SYSTOLIC BLOOD PRESSURE: 132 MMHG | DIASTOLIC BLOOD PRESSURE: 72 MMHG | RESPIRATION RATE: 18 BRPM | OXYGEN SATURATION: 94 %

## 2020-11-17 DIAGNOSIS — E11.42 DIABETIC PERIPHERAL NEUROPATHY ASSOCIATED WITH TYPE 2 DIABETES MELLITUS (HCC): ICD-10-CM

## 2020-11-17 DIAGNOSIS — E11.42 TYPE 2 DIABETES MELLITUS WITH DIABETIC POLYNEUROPATHY, WITHOUT LONG-TERM CURRENT USE OF INSULIN (HCC): ICD-10-CM

## 2020-11-17 DIAGNOSIS — I63.22 BASILAR ARTERY OCCLUSION WITH CEREBRAL INFARCTION (HCC): ICD-10-CM

## 2020-11-17 DIAGNOSIS — E78.2 MIXED HYPERLIPIDEMIA: ICD-10-CM

## 2020-11-17 DIAGNOSIS — I10 ESSENTIAL HYPERTENSION, BENIGN: ICD-10-CM

## 2020-11-17 DIAGNOSIS — I63.9 LEFT PONTINE STROKE (HCC): Primary | ICD-10-CM

## 2020-11-17 PROCEDURE — 3331090001 HH PPS REVENUE CREDIT

## 2020-11-17 PROCEDURE — 3331090002 HH PPS REVENUE DEBIT

## 2020-11-17 PROCEDURE — G0463 HOSPITAL OUTPT CLINIC VISIT: HCPCS | Performed by: FAMILY MEDICINE

## 2020-11-17 PROCEDURE — 99213 OFFICE O/P EST LOW 20 MIN: CPT | Performed by: FAMILY MEDICINE

## 2020-11-17 RX ORDER — MENTHOL
1000 GEL (GRAM) TOPICAL DAILY
COMMUNITY
End: 2022-06-13

## 2020-11-17 NOTE — PROGRESS NOTES
HISTORY OF PRESENT ILLNESS  Patient encounter by synchronous (real time) audio-visual technology which is patient initiated. The Patient is aware that this encounter is a billable service with coverage determined by their insurance carrier,as discussed at the time of check in. The patient has given verbal consent to proceed    Beatriz Velasquez is a 76 y.o. female. F/U hospitalization for l pontine cva,discharged 11/9/20. Convalescing at home with daughter and getting [facility] PT/OT. doing well remains weak ad fatiguable with some unsteadiness of gait. Not using walker or cane. Denies fever chills,cough,malaise. Meds reviewed    Hospital Follow Up   The history is provided by the patient. This is a new problem. The current episode started more than 1 week ago. The problem occurs daily. The problem has been gradually improving. Pertinent negatives include no chest pain, no abdominal pain, no headaches and no shortness of breath. Extremity Weakness   The history is provided by the patient. This is a new problem. The current episode started more than 1 week ago. The problem has been gradually improving. There was right upper extremity and left upper extremity focality noted. Primary symptoms include loss of balance. Pertinent negatives include no memory loss and no mental status change. Pertinent negatives include no shortness of breath, no chest pain and no headaches. Hypertension    The history is provided by the patient. This is a chronic problem. The problem has not changed since onset. Associated symptoms include malaise/fatigue. Pertinent negatives include no chest pain, no orthopnea, no palpitations, no headaches, no peripheral edema, no dizziness and no shortness of breath. Review of Systems   Constitutional: Positive for malaise/fatigue. Negative for chills and fever. Respiratory: Negative for shortness of breath. Cardiovascular: Negative for chest pain, palpitations and orthopnea.    Gastrointestinal: Negative for abdominal pain. Neurological: Positive for weakness and loss of balance. Negative for dizziness and headaches. Psychiatric/Behavioral: Negative for memory loss. Physical Exam       Appearance: no distress,alert cooperative  HEENT:no abnormalities visible  Chest: unlabored respirations  Skin: no pallor or cyanosis  Neuro:alert oriented,grossly intact      ASSESSMENT and PLAN  Diagnoses and all orders for this visit:    1. Left pontine stroke ([facility] [address]    2. Basilar artery occlusion with cerebral infarction ([facility] [address]    3. Type 2 diabetes mellitus with diabetic polyneuropathy, without long-term current use of insulin ([facility] [address]    4. Diabetic peripheral neuropathy associated with type 2 diabetes mellitus ([facility] [address]    5. Essential hypertension, benign,may reduce atenolol to 1 tab daily    6. Mixed hyperlipidemia    Doing well,. Continue current meds and treatments. Due to this being a telehealth encounter (During  [address] [facility]),evaluation of the following organ systems was limited:Vitals/Constitutional/EENT,Respiratory,CV,GI,,MS,Neuro,Skin /Heme-Lymph-Imm  Pursuant to the emergency declaration under the [employer] [address] waiver authority and the [facility] Appropriations Act,this Virtual Visit was conducted ,with patient consent,to reduce the patients risk of exposure to Covid 19 and to provide continuity of care  for an established patient. Services were provided through a video synchronous discussion virtually to substitute for in person appointment. This visit was completed using Doxy. me    Time in Virtual Visit:15    minutes

## 2020-11-17 NOTE — PROGRESS NOTES
Chief Complaint   Patient presents with   [facility] Follow Up     CVA     1. Have you been to the ER, urgent care clinic since your last visit? Hospitalized since your last visit? yes [id number] [facility] [facility]    2. Have you seen or consulted any other health care providers outside of the [address] [place] [address] since your last visit? Include any pap smears or colon screening.  no

## 2020-11-18 ENCOUNTER — HOME CARE VISIT (OUTPATIENT)
Dept: SCHEDULING | Facility: HOME HEALTH | Age: 75
End: 2020-11-18
Payer: MEDICARE

## 2020-11-18 PROCEDURE — G0151 HHCP-SERV OF PT,EA 15 MIN: HCPCS

## 2020-11-18 PROCEDURE — 3331090001 HH PPS REVENUE CREDIT

## 2020-11-18 PROCEDURE — 3331090002 HH PPS REVENUE DEBIT

## 2020-11-19 PROCEDURE — 3331090002 HH PPS REVENUE DEBIT

## 2020-11-19 PROCEDURE — 3331090001 HH PPS REVENUE CREDIT

## 2020-11-20 PROCEDURE — 3331090002 HH PPS REVENUE DEBIT

## 2020-11-20 PROCEDURE — 3331090001 HH PPS REVENUE CREDIT

## 2020-11-21 VITALS
RESPIRATION RATE: 18 BRPM | OXYGEN SATURATION: 93 % | SYSTOLIC BLOOD PRESSURE: 122 MMHG | HEART RATE: 60 BPM | TEMPERATURE: 97.6 F | DIASTOLIC BLOOD PRESSURE: 72 MMHG

## 2020-11-21 PROCEDURE — 3331090002 HH PPS REVENUE DEBIT

## 2020-11-21 PROCEDURE — 3331090001 HH PPS REVENUE CREDIT

## 2020-11-22 PROCEDURE — 3331090001 HH PPS REVENUE CREDIT

## 2020-11-22 PROCEDURE — 3331090002 HH PPS REVENUE DEBIT

## 2020-11-23 PROCEDURE — 3331090001 HH PPS REVENUE CREDIT

## 2020-11-23 PROCEDURE — 3331090002 HH PPS REVENUE DEBIT

## 2020-11-24 ENCOUNTER — HOME CARE VISIT (OUTPATIENT)
Dept: SCHEDULING | Facility: HOME HEALTH | Age: 75
End: 2020-11-24
Payer: MEDICARE

## 2020-11-24 ENCOUNTER — TRANSCRIBE ORDER (OUTPATIENT)
Dept: SCHEDULING | Age: 75
End: 2020-11-24

## 2020-11-24 ENCOUNTER — PATIENT OUTREACH (OUTPATIENT)
Dept: CASE MANAGEMENT | Age: 75
End: 2020-11-24

## 2020-11-24 DIAGNOSIS — Z12.31 ENCOUNTER FOR MAMMOGRAM TO ESTABLISH BASELINE MAMMOGRAM: Primary | ICD-10-CM

## 2020-11-24 PROCEDURE — G0151 HHCP-SERV OF PT,EA 15 MIN: HCPCS

## 2020-11-24 PROCEDURE — 3331090002 HH PPS REVENUE DEBIT

## 2020-11-24 PROCEDURE — 3331090001 HH PPS REVENUE CREDIT

## 2020-11-24 NOTE — PROGRESS NOTES
Called patient to follow up. LMTCB. Incoming call received from Scott Alcantara, patients granddaughter  CTN spoke to patient  Reports she is doing well    Reports spouse continues to be hospitalized s/p CABG and kidney transplant  Reports spouse went back to ICU for dialysis-plan for him to transfer to [address] [place] [address] upon discharge    Goals      Attend follow up appointments on schedule        11/24/20   Attended VV with PCP on 11/17   Will attend follow up appt with Dr. Jaqueline Castaneda (pulmonary) scheduled 1/26   Will attend breathing test scheduled 1/15       Prevent complications post hospitalization. 11/24/20   Reviewed BE FAST acronym        Supportive resources in place to maintain patient in the community (ie.  Home Health)        11/24/20   HH PT plans to discharge today   Reports walking for approximately 15 minutes working up to 30 minutes

## 2020-11-25 ENCOUNTER — TRANSCRIBE ORDER (OUTPATIENT)
Dept: INTERNAL MEDICINE CLINIC | Age: 75
End: 2020-11-25

## 2020-11-25 ENCOUNTER — HOSPITAL ENCOUNTER (OUTPATIENT)
Dept: MAMMOGRAPHY | Age: 75
Discharge: HOME | End: 2020-11-25
Attending: FAMILY MEDICINE
Payer: MEDICARE

## 2020-11-25 DIAGNOSIS — Z12.31 ENCOUNTER FOR MAMMOGRAM TO ESTABLISH BASELINE MAMMOGRAM: ICD-10-CM

## 2020-11-25 PROCEDURE — 77067 SCR MAMMO BI INCL CAD: CPT

## 2020-11-27 VITALS
OXYGEN SATURATION: 99 % | HEART RATE: 58 BPM | SYSTOLIC BLOOD PRESSURE: 120 MMHG | TEMPERATURE: 97.6 F | DIASTOLIC BLOOD PRESSURE: 60 MMHG | RESPIRATION RATE: 18 BRPM

## 2020-11-29 RX ORDER — MELOXICAM 15 MG/1
TABLET ORAL
Qty: 90 TAB | Refills: 3 | Status: SHIPPED | OUTPATIENT
Start: 2020-11-29 | End: 2021-02-23

## 2020-12-01 ENCOUNTER — OFFICE VISIT (OUTPATIENT)
Dept: NEUROLOGY | Age: 75
End: 2020-12-01
Payer: MEDICARE

## 2020-12-01 VITALS
TEMPERATURE: 97.4 F | BODY MASS INDEX: 32.49 KG/M2 | OXYGEN SATURATION: 94 % | SYSTOLIC BLOOD PRESSURE: 109 MMHG | DIASTOLIC BLOOD PRESSURE: 93 MMHG | WEIGHT: 195 LBS | HEIGHT: 65 IN | HEART RATE: 61 BPM

## 2020-12-01 DIAGNOSIS — I63.22 BASILAR ARTERY OCCLUSION WITH CEREBRAL INFARCTION (HCC): ICD-10-CM

## 2020-12-01 DIAGNOSIS — E11.42 DIABETIC PERIPHERAL NEUROPATHY ASSOCIATED WITH TYPE 2 DIABETES MELLITUS (HCC): ICD-10-CM

## 2020-12-01 DIAGNOSIS — I65.23 BILATERAL CAROTID ARTERY STENOSIS: ICD-10-CM

## 2020-12-01 DIAGNOSIS — I63.9 LEFT PONTINE STROKE (HCC): Primary | ICD-10-CM

## 2020-12-01 PROCEDURE — G8536 NO DOC ELDER MAL SCRN: HCPCS | Performed by: PSYCHIATRY & NEUROLOGY

## 2020-12-01 PROCEDURE — 1090F PRES/ABSN URINE INCON ASSESS: CPT | Performed by: PSYCHIATRY & NEUROLOGY

## 2020-12-01 PROCEDURE — G8755 DIAS BP > OR = 90: HCPCS | Performed by: PSYCHIATRY & NEUROLOGY

## 2020-12-01 PROCEDURE — 99214 OFFICE O/P EST MOD 30 MIN: CPT | Performed by: PSYCHIATRY & NEUROLOGY

## 2020-12-01 PROCEDURE — 3044F HG A1C LEVEL LT 7.0%: CPT | Performed by: PSYCHIATRY & NEUROLOGY

## 2020-12-01 PROCEDURE — G8417 CALC BMI ABV UP PARAM F/U: HCPCS | Performed by: PSYCHIATRY & NEUROLOGY

## 2020-12-01 PROCEDURE — 2022F DILAT RTA XM EVC RTNOPTHY: CPT | Performed by: PSYCHIATRY & NEUROLOGY

## 2020-12-01 PROCEDURE — 3017F COLORECTAL CA SCREEN DOC REV: CPT | Performed by: PSYCHIATRY & NEUROLOGY

## 2020-12-01 PROCEDURE — G8752 SYS BP LESS 140: HCPCS | Performed by: PSYCHIATRY & NEUROLOGY

## 2020-12-01 PROCEDURE — 1100F PTFALLS ASSESS-DOCD GE2>/YR: CPT | Performed by: PSYCHIATRY & NEUROLOGY

## 2020-12-01 PROCEDURE — G8427 DOCREV CUR MEDS BY ELIG CLIN: HCPCS | Performed by: PSYCHIATRY & NEUROLOGY

## 2020-12-01 PROCEDURE — 1111F DSCHRG MED/CURRENT MED MERGE: CPT | Performed by: PSYCHIATRY & NEUROLOGY

## 2020-12-01 PROCEDURE — G9717 DOC PT DX DEP/BP F/U NT REQ: HCPCS | Performed by: PSYCHIATRY & NEUROLOGY

## 2020-12-01 PROCEDURE — G8400 PT W/DXA NO RESULTS DOC: HCPCS | Performed by: PSYCHIATRY & NEUROLOGY

## 2020-12-01 PROCEDURE — 3288F FALL RISK ASSESSMENT DOCD: CPT | Performed by: PSYCHIATRY & NEUROLOGY

## 2020-12-01 RX ORDER — CLOPIDOGREL BISULFATE 75 MG/1
75 TABLET ORAL DAILY
Qty: 90 TAB | Refills: 4 | Status: SHIPPED | OUTPATIENT
Start: 2020-12-01 | End: 2021-12-01

## 2020-12-01 RX ORDER — DULOXETIN HYDROCHLORIDE 60 MG/1
60 CAPSULE, DELAYED RELEASE ORAL DAILY
COMMUNITY
End: 2022-09-27 | Stop reason: SDUPTHER

## 2020-12-01 NOTE — LETTER
12/1/20    Patient: Mike Jasmine   YOB: 1945   Date of Visit: 12/1/2020     Malcolm Sky, [address] [facility] [address]  VIA In Basket    Dear Malcolm Sky MD,      Thank you for referring Ms. Ronal Hamilton to [facility] for evaluation. My notes for this consultation are attached. Consult  REFERRED BY:  Naila Montano MD    CHIEF COMPLAINT: Right-sided weakness      Subjective:     Mike Jasmine is a 76 y.o. right-handed  female seen at the request of Dr. Romayne Ginger for evaluation of of new problem of following up on the patient's recent stroke and pontine infarct after her discharge from the hospital 1 month ago for this problem, and because of her severe vertebrobasilar stenotic disease make sure she is stable on dual antiplatelet therapy of Plavix and aspirin. The patient has questions about the Plavix, and seems to want to come off the medication, because of dietary restrictions that are mainly used for Coumadin, and we advised her that we do not need to do that with Plavix, test just Coumadin, and that she needs to take the Plavix because she has such severe vertebrobasilar disease found on MRA of the brain. She recovered completely from her stroke symptoms, and is doing better now. Her children came in more concerned about her bipolar disorder, and I told them they need to follow-up with a psychiatrist about that, but she does not seem to have a major decompensation psychiatrically to me at this time. She was admitted to the hospital in early November and had a left pontine tiny lacunar infarct that seemed to resolve clinically quickly. Her stroke was manifest by sudden weakness that occurred day prior to admission 4 times of weakness in the right arm, that came in with 4 different times lasting about 10 minutes to 20 minutes in duration, and for which she went to the ER.   She was here visiting her  who just had bypass surgery and became so weak she fell over in the bed and could not use the phone. She was rushed to the emergency room and had a normal CT of the head, and then had an MR I of the brain that shows a small left pontine infarct, and an MR a of the brain that shows severe posterior circulation disease with an occluded basilar artery wound with reconstitution distally, a stenotic right vertebral artery and mild disease in the rest of the vessels. She had several episodes of visual auras without headaches which she would get a growing scotomata in her vision worse in the right eye than the left, that would come and go over several minutes time and went to see an eye doctor who told her she probably had ophthalmic migraine equivalents. The patient was on aspirin prior to admission so she has been started on Plavix and aspirin and Lipitor. She is a borderline diabetic and an ex-smoker and has high blood pressure, so she has 3 of the 4 major risk factors for stroke. She had no chest pain no palpitations and no other cardiac symptoms with this event. She is extremely allergic to IVP dye so a CTA was not done. Patient has remained stable, no recurrent symptoms, on dual antiplatelet therapy and high-dose statin, and working on her latent diabetes also. Patient anxious to go home, and discharge is being planned. We will follow-up in stroke clinic in 2 to 4 weeks time, in the office. Discussed with the hospitalist and the patient and her daughter. Did advise him that she did indeed have a stroke in the left brainstem, and she has significant cerebrovascular disease that puts her at risk for recurrent disease, and she needs to modify her risk factors as far as blood pressure, diabetes, cholesterol, and she does not smoke, so hopefully she can control those with her PCP.   She also was advised of the warning signs of stroke as far as be fast, as balance, eyes, face, arms, sensory in time, and to seek medical help immediately if there is any further problem. Past Medical History:   Diagnosis Date    Allergic rhinitis, cause unspecified 1/17/2011    Asthma     Asthma     Bladder cancer ([facility] [address] 7/7/2010    Chronic airway obstruction, not elsewhere classified 1/17/2011    Depression 1/17/2011    Diverticulosis 7/7/2010    Encounter for long-term (current) use of other medications 1/17/2011    Essential hypertension, benign 11/30/2012    Hypercholesterolemia     Mixed hyperlipidemia 1/17/2011    Sleep apnea     CPAP      Past Surgical History:   Procedure Laterality Date    ENDOSCOPY, COLON, DIAGNOSTIC      HX CARPAL TUNNEL RELEASE      right    HX CHOLECYSTECTOMY      HX CYSTOCELE REPAIR      HX HYSTERECTOMY      HX ORTHOPAEDIC      left thumb surgery    HX UROLOGICAL      resection of bladder tumor; bladder sling    REPAIR OF RECTOCELE       Family History   Problem Relation Age of Onset    Diabetes Mother     Heart Disease Mother     Elevated Lipids Mother     Cancer Mother         basal cell ca    Hypertension Father     Cancer Father         basal cell ca    Cancer Sister         breast    Breast Cancer Sister         46s      Social History     Tobacco Use    Smoking status: Never Smoker    Smokeless tobacco: Never Used   Substance Use Topics    Alcohol use: No     Comment: occ. wine         Current Outpatient Medications:     DULoxetine (Cymbalta) 60 mg capsule, Take 60 mg by mouth daily. , Disp: , Rfl:     TURMERIC PO, Take  by mouth daily. , Disp: , Rfl:     clopidogreL (PLAVIX) 75 mg tab, Take 1 Tab by mouth daily. , Disp: 90 Tab, Rfl: 4    vitamin e (E GEMS) 1,000 unit capsule, Take 1,000 Units by mouth daily. , Disp: , Rfl:     gemfibroziL (LOPID) 600 mg tablet, TAKE 1 TABLET BY MOUTH  TWICE DAILY, Disp: 180 Tab, Rfl: 3    atenoloL (TENORMIN) 50 mg tablet, TAKE 1.5 TABLET BY MOUTH  DAILY.  (Appointment needs to be made for further refills) (Patient taking differently: TAKE 1 TABLET BY MOUTH  DAILY. (Appointment needs to be made for further refills)), Disp: 45 Tab, Rfl: 3    cholecalciferol (Vitamin D3) (1000 Units /25 mcg) tablet, Take 1,000 Units by mouth daily. , Disp: , Rfl:     budesonide-formoterol (SYMBICORT) 160-4.5 mcg/actuation HFAA, Take 2 Puffs by inhalation two (2) times a day., Disp: 3 Inhaler, Rfl: 3    montelukast (SINGULAIR) 10 mg tablet, TAKE 1 TABLET BY MOUTH  DAILY, Disp: 90 Tab, Rfl: 3    lysine (L-LYSINE) 500 mg Tab tablet, Take 500 mg by mouth as needed. , Disp: , Rfl:     fexofenadine (ALLEGRA) 180 mg tablet, Take 180 mg by mouth daily. , Disp: , Rfl:     meloxicam (MOBIC) 15 mg tablet, TAKE 1 TABLET BY MOUTH  DAILY, Disp: 90 Tab, Rfl: 3    DULoxetine (CYMBALTA) 30 mg capsule, TAKE 1 CAPSULE BY MOUTH  DAILY, Disp: 90 Cap, Rfl: 3    clonazePAM (KLONOPIN) 1 mg tablet, Take 1 Tab by mouth daily as needed (anxiety). , Disp: 90 Tab, Rfl: 3    omega-3 fatty acids-vitamin e (Fish Oil) 1,000 mg cap, Take 1 Cap by mouth two (2) times a day., Disp: , Rfl:     aspirin 81 mg chewable tablet, Take 81 mg by mouth daily. , Disp: , Rfl:         Allergies   Allergen Reactions    Iodine Shortness of Breath    Ciprofloxacin Unknown (comments)    Hydrocodone Unknown (comments)    Lithium Unknown (comments)    Pravastatin Myalgia    Simvastatin Unknown (comments)    Wellbutrin [Bupropion Hcl] Unknown (comments)      MRI Results (most recent):  Results from Hospital Encounter encounter on 11/07/20   MRA NECK WO CONT    Narrative PRELIMINARY REPORT    There is an acute infarction in the mid kelly just to the left of midline. Results called by the technologist.    MRA of the [place] demonstrates small distal right vertebral artery. There is occlusion of the mid basilar artery. There is a right posterior  communicating artery. MRA of the neck demonstrates common carotid bifurcations to be patent. Vertebral  arteries are codominant.   Results called by the technologist.  Preliminary report was provided by  , the on-call radiologist, at 931    Final report to follow. END PRELIMINARY REPORT                    Results from Hospital Encounter encounter on 11/07/20   MRA NECK WO CONT    Narrative PRELIMINARY REPORT    There is an acute infarction in the mid kelly just to the left of midline. Results called by the technologist.    MRA of the [place] demonstrates small distal right vertebral artery. There is occlusion of the mid basilar artery. There is a right posterior  communicating artery. MRA of the neck demonstrates common carotid bifurcations to be patent. Vertebral  arteries are codominant. Results called by the technologist.  Preliminary report was provided by  , the on-call radiologist, at 674    Final report to follow. END PRELIMINARY REPORT                Review of Systems:  A comprehensive review of systems was negative except for: Constitutional: positive for fatigue and malaise  Eyes: positive for visual disturbance  Musculoskeletal: positive for myalgias, arthralgias and stiff joints  Neurological: positive for speech problems, coordination problems, gait problems and weakness  Behvioral/Psych: positive for anxiety   Vitals:    12/01/20 0957   BP: (!) 109/93   Pulse: 61   Temp: 97.4 °F (36.3 °C)   TempSrc: Temporal   SpO2: 94%   Weight: 195 lb (88.5 kg)   Height: 5' 5\" (1.651 m)     Objective:     I      NEUROLOGICAL EXAM:    Appearance: The patient is well developed, well nourished, mildly overweight, provides a coherent history and is in no acute distress. Mental Status: Oriented to time, place and person, and the president, cognitive function is normal and speech is fluent and no aphasia or dysarthria. Mood and affect appropriate, but anxious but does not appear that depressed. Cranial Nerves:   Intact visual fields. Fundi are benign, disc are flat, no lesions seen on funduscopy. CARROLL, EOM's full, no nystagmus, no ptosis. Facial sensation is normal. Corneal reflexes are not tested. Facial movement is symmetric. Hearing is normal bilaterally. Palate is midline with normal sternocleidomastoid and trapezius muscles are normal. Tongue is midline. Neck without meningismus or bruits  Temporal arteries are not tender or enlarged  TMJ areas are not tender on palpation   Motor:  5/5 strength in upper and lower proximal and distal muscles. Normal bulk and tone. No fasciculations. Rapid alternating movement is symmetric and intact bilaterally   Reflexes:   Deep tendon reflexes 2+/4 and symmetrical.  No babinski or clonus present   Sensory:   Normal to touch, pinprick and vibration and temperature, except in both feet she has mild decreased to temperature and pin and vibration to ankle level. DSS is intact   Gait:  Normal gait for patient's age. Tremor:   No tremor noted. Cerebellar:  No abnormal cerebellar signs present on Romberg and tandem testing and finger-nose-finger exam.   Neurovascular:  Normal heart sounds and regular rhythm, peripheral pulses decreased, and no carotid bruits. Assessment:       ICD-10-CM ICD-9-CM    1. Left pontine stroke ([facility])  I63.50 434.91 PLATELET FUNCTION, VERIFY NOW P2Y12      HEMOGLOBIN A1C WITH EAG   2. Bilateral carotid artery stenosis  I65.23 433.10 PLATELET FUNCTION, VERIFY NOW P2Y12     433.30 HEMOGLOBIN A1C WITH EAG   3. Diabetic peripheral neuropathy associated with type 2 diabetes mellitus ([facility])  E11.42 250.60 PLATELET FUNCTION, VERIFY NOW P2Y12     357.2 HEMOGLOBIN A1C WITH EAG   4. Basilar artery occlusion with cerebral infarction ([facility])  I63.22 433.01 PLATELET FUNCTION, VERIFY NOW P2Y12      HEMOGLOBIN A1C WITH EAG     Active Problems:    * No active hospital problems.  *      Plan:     Patient with new problem of worry about diet restrictions on, we have reassured her that she do not need to worry about that, that she needs to take the Plavix and cannot stop it because of her severe vertebrobasilar disease. We will check a platelet function test just to make sure she is in the therapeutic range on her Plavix. We talked to the daughters in detail about her condition neurologically, said that she looks stable, and that she needs to talk to her psychiatrist about her bipolar disorder. We also advised the children that she needs to stay on the Plavix. Patient has had no further strokelike symptoms. Patient has a mid basilar artery occlusion and then reconstitution distally and severe genuine vascular disease and possibly an occluded right vertebral artery or stenosis, so patient will need dual antiplatelet therapy for a while due to her high risk of vascular disease and she was already on aspirin. We advised the patient that she has 3 of the 4 main risk factors for stroke as far as diabetes, high blood pressure, cholesterol, and that the fourth and smoking she should never smoke again. We talked to the daughter at length and the patient at length. Patient also has diabetic neuropathy in her feet, with decreased sensation in both feet, and her hemoglobin A1c was 6.2, we advised that she needs to control her blood sugars better to prevent the neuropathy from getting worse, because treating the diabetes is the single most important factor in controlling the neuropathy  Continue complete neurovascular work-up as ordered, and new PT and OT and speech, will follow with you. Patient has remained stable, no recurrent symptoms, on dual antiplatelet therapy and high-dose statin, and working on her latent diabetes also. Did advise him that she did indeed have a stroke in the left brainstem, and she has significant cerebrovascular disease that puts her at risk for recurrent disease, and she needs to modify her risk factors as far as blood pressure, diabetes, cholesterol, and she does not smoke, so hopefully she can control those with her PCP.   She also was advised of the warning signs of stroke as far as be fast, as balance, eyes, face, arms, sensory in time, and to seek medical help immediately if there is any further problem. 35 minutes spent the patient ended daughters discussing her diagnosis prognosis and treatment options. Follow-up in 6 months time at which time we will check a carotid Doppler. We will check hemoglobin A1c to further evaluate her diabetic control because she has not had that done recently, and check her platelet function test on Plavix to make sure she is therapeutically on antiplatelet therapy. Signed By: Balaji Landaverde MD     December 1, 2020       CC: Ryan Fowler MD  FAX: [phone]      If you have questions, please do not hesitate to call me. I look forward to following your patient along with you.       Sincerely,    Balaji Landaverde MD

## 2020-12-01 NOTE — PATIENT INSTRUCTIONS
Preventing Falls: Care Instructions  Your Care Instructions     Getting around your home safely can be a challenge if you have injuries or health problems that make it easy for you to fall. Loose rugs and furniture in walkways are among the dangers for many older people who have problems walking or who have poor eyesight. People who have conditions such as arthritis, osteoporosis, or dementia also have to be careful not to fall. You can make your home safer with a few simple measures. Follow-up care is a key part of your treatment and safety. Be sure to make and go to all appointments, and call your doctor if you are having problems. It's also a good idea to know your test results and keep a list of the medicines you take. How can you care for yourself at home? Taking care of yourself  · You may get dizzy if you do not drink enough water. To prevent dehydration, drink plenty of fluids, enough so that your urine is light yellow or clear like water. Choose water and other caffeine-free clear liquids. If you have kidney, heart, or liver disease and have to limit fluids, talk with your doctor before you increase the amount of fluids you drink. · Exercise regularly to improve your strength, muscle tone, and balance. Walk if you can. Swimming may be a good choice if you cannot walk easily. · Have your vision and hearing checked each year or any time you notice a change. If you have trouble seeing and hearing, you might not be able to avoid objects and could lose your balance. · Know the side effects of the medicines you take. Ask your doctor or pharmacist whether the medicines you take can affect your balance. Sleeping pills or sedatives can affect your balance. · Limit the amount of alcohol you drink. Alcohol can impair your balance and other senses. · Ask your doctor whether calluses or corns on your feet need to be removed.  If you wear loose-fitting shoes because of calluses or corns, you can lose your balance and fall. · Talk to your doctor if you have numbness in your feet. Preventing falls at home  · Remove raised doorway thresholds, throw rugs, and clutter. Repair loose carpet or raised areas in the floor. · Move furniture and electrical cords to keep them out of walking paths. · Use nonskid floor wax, and wipe up spills right away, especially on ceramic tile floors. · If you use a walker or cane, put rubber tips on it. If you use crutches, clean the bottoms of them regularly with an abrasive pad, such as steel wool. · Keep your house well lit, especially [place], and outside walkways. Use night-lights in areas such as hallways and bathrooms. Add extra light switches or use remote switches (such as switches that go on or off when you clap your hands) to make it easier to turn lights on if you have to get up during the night. · Install sturdy handrails on stairways. · Move items in your cabinets so that the things you use a lot are on the lower shelves (about waist level). · Keep a cordless phone and a flashlight with new batteries by your bed. If possible, put a phone in each of the main rooms of your house, or carry a cell phone in case you fall and cannot reach a phone. Or, you can wear a device around your neck or wrist. You push a button that sends a signal for help. · Wear low-heeled shoes that fit well and give your feet good support. Use footwear with nonskid soles. Check the heels and soles of your shoes for wear. Repair or replace worn heels or soles. · Do not wear socks without shoes on wood floors. · Walk on the grass when the sidewalks are slippery. If you live in an area that gets snow and ice in the winter, sprinkle salt on slippery steps and sidewalks. Preventing falls in the bath  · Install grab bars and nonskid mats inside and outside your shower or tub and near the toilet and sinks. · Use shower chairs and bath benches.   · Use a hand-held shower head that will allow you to sit while showering. · Get into a tub or shower by putting the weaker leg in first. Get out of a tub or shower with your strong side first.  · Repair loose toilet seats and consider installing a raised toilet seat to make getting on and off the toilet easier. · Keep your bathroom door unlocked while you are in the shower. Where can you learn more? Go to http://www.britt.com/  Enter G117 in the search box to learn more about \"Preventing Falls: Care Instructions. \"  Current as of: April 15, 2020               Content Version: 12.6  © 7191-4086 [employer]. Care instructions adapted under license by [employer] (which disclaims liability or warranty for this information). If you have questions about a medical condition or this instruction, always ask your healthcare professional. [address] any warranty or liability for your use of this information. Office Policies    o Phone calls/patient messages:  Please allow up to 24 hours for someone in the office to contact you about your call or message. Be mindful your provider may be out of the office or your message may require further review. We encourage you to use [employer] for your messages as this is a faster, more efficient way to communicate with our office    o Medication Refills:  Prescription medications require up to 48 business hours to process. We encourage you to use [employer] for your refills. For controlled medications: Please allow up to 72 business hours to process. Certain medications may require you to  a written prescription at our office. NO narcotic/controlled medications will be prescribed after 4pm Monday through Friday or on weekends    o Form/Paperwork Completion:  We ask that you allow 7-14 business days. You may also download your forms to [employer] to have your doctor print off.     Due to COVID-19, please note there may be a longer wait time than usual. Thank you

## 2020-12-02 NOTE — PROGRESS NOTES
Consult  REFERRED BY:  Dottie Luke MD    CHIEF COMPLAINT: Right-sided weakness      Subjective:     Samy Ramos is a 76 y.o. right-handed  female seen at the request of Dr. Pepe Garcia for evaluation of of new problem of following up on the patient's recent stroke and pontine infarct after her discharge from the hospital 1 month ago for this problem, and because of her severe vertebrobasilar stenotic disease make sure she is stable on dual antiplatelet therapy of Plavix and aspirin. The patient has questions about the Plavix, and seems to want to come off the medication, because of dietary restrictions that are mainly used for Coumadin, and we advised her that we do not need to do that with Plavix, test just Coumadin, and that she needs to take the Plavix because she has such severe vertebrobasilar disease found on MRA of the brain. She recovered completely from her stroke symptoms, and is doing better now. Her children came in more concerned about her bipolar disorder, and I told them they need to follow-up with a psychiatrist about that, but she does not seem to have a major decompensation psychiatrically to me at this time. She was admitted to the hospital in early November and had a left pontine tiny lacunar infarct that seemed to resolve clinically quickly. Her stroke was manifest by sudden weakness that occurred day prior to admission 4 times of weakness in the right arm, that came in with 4 different times lasting about 10 minutes to 20 minutes in duration, and for which she went to the ER. She was here visiting her  who just had bypass surgery and became so weak she fell over in the bed and could not use the phone.   She was rushed to the emergency room and had a normal CT of the head, and then had an MR I of the brain that shows a small left pontine infarct, and an MR a of the brain that shows severe posterior circulation disease with an occluded basilar artery wound with reconstitution distally, a stenotic right vertebral artery and mild disease in the rest of the vessels. She had several episodes of visual auras without headaches which she would get a growing scotomata in her vision worse in the right eye than the left, that would come and go over several minutes time and went to see an eye doctor who told her she probably had ophthalmic migraine equivalents. The patient was on aspirin prior to admission so she has been started on Plavix and aspirin and Lipitor. She is a borderline diabetic and an ex-smoker and has high blood pressure, so she has 3 of the 4 major risk factors for stroke. She had no chest pain no palpitations and no other cardiac symptoms with this event. She is extremely allergic to IVP dye so a CTA was not done. Patient has remained stable, no recurrent symptoms, on dual antiplatelet therapy and high-dose statin, and working on her latent diabetes also. Patient anxious to go home, and discharge is being planned. We will follow-up in stroke clinic in 2 to 4 weeks time, in the office. Discussed with the hospitalist and the patient and her daughter. Did advise him that she did indeed have a stroke in the left brainstem, and she has significant cerebrovascular disease that puts her at risk for recurrent disease, and she needs to modify her risk factors as far as blood pressure, diabetes, cholesterol, and she does not smoke, so hopefully she can control those with her PCP. She also was advised of the warning signs of stroke as far as be fast, as balance, eyes, face, arms, sensory in time, and to seek medical help immediately if there is any further problem.     Past Medical History:   Diagnosis Date    Allergic rhinitis, cause unspecified 1/17/2011    Asthma     Asthma     Bladder cancer ([facility] [address] 7/7/2010    Chronic airway obstruction, not elsewhere classified 1/17/2011    Depression 1/17/2011    Diverticulosis 7/7/2010    Encounter for long-term (current) use of other medications 1/17/2011    Essential hypertension, benign 11/30/2012    Hypercholesterolemia     Mixed hyperlipidemia 1/17/2011    Sleep apnea     CPAP      Past Surgical History:   Procedure Laterality Date    ENDOSCOPY, COLON, DIAGNOSTIC      HX CARPAL TUNNEL RELEASE      right    HX CHOLECYSTECTOMY      HX CYSTOCELE REPAIR      HX HYSTERECTOMY      HX ORTHOPAEDIC      left thumb surgery    HX UROLOGICAL      resection of bladder tumor; bladder sling    REPAIR OF RECTOCELE       Family History   Problem Relation Age of Onset    Diabetes Mother     Heart Disease Mother     Elevated Lipids Mother     Cancer Mother         basal cell ca    Hypertension Father     Cancer Father         basal cell ca    Cancer Sister         breast    Breast Cancer Sister         46s      Social History     Tobacco Use    Smoking status: Never Smoker    Smokeless tobacco: Never Used   Substance Use Topics    Alcohol use: No     Comment: occ. wine         Current Outpatient Medications:     DULoxetine (Cymbalta) 60 mg capsule, Take 60 mg by mouth daily. , Disp: , Rfl:     TURMERIC PO, Take  by mouth daily. , Disp: , Rfl:     clopidogreL (PLAVIX) 75 mg tab, Take 1 Tab by mouth daily. , Disp: 90 Tab, Rfl: 4    vitamin e (E GEMS) 1,000 unit capsule, Take 1,000 Units by mouth daily. , Disp: , Rfl:     gemfibroziL (LOPID) 600 mg tablet, TAKE 1 TABLET BY MOUTH  TWICE DAILY, Disp: 180 Tab, Rfl: 3    atenoloL (TENORMIN) 50 mg tablet, TAKE 1.5 TABLET BY MOUTH  DAILY. (Appointment needs to be made for further refills) (Patient taking differently: TAKE 1 TABLET BY MOUTH  DAILY. (Appointment needs to be made for further refills)), Disp: 45 Tab, Rfl: 3    cholecalciferol (Vitamin D3) (1000 Units /25 mcg) tablet, Take 1,000 Units by mouth daily. , Disp: , Rfl:     budesonide-formoterol (SYMBICORT) 160-4.5 mcg/actuation HFAA, Take 2 Puffs by inhalation two (2) times a day., Disp: 3 Inhaler, Rfl: 3   montelukast (SINGULAIR) 10 mg tablet, TAKE 1 TABLET BY MOUTH  DAILY, Disp: 90 Tab, Rfl: 3    lysine (L-LYSINE) 500 mg Tab tablet, Take 500 mg by mouth as needed. , Disp: , Rfl:     fexofenadine (ALLEGRA) 180 mg tablet, Take 180 mg by mouth daily. , Disp: , Rfl:     meloxicam (MOBIC) 15 mg tablet, TAKE 1 TABLET BY MOUTH  DAILY, Disp: 90 Tab, Rfl: 3    DULoxetine (CYMBALTA) 30 mg capsule, TAKE 1 CAPSULE BY MOUTH  DAILY, Disp: 90 Cap, Rfl: 3    clonazePAM (KLONOPIN) 1 mg tablet, Take 1 Tab by mouth daily as needed (anxiety). , Disp: 90 Tab, Rfl: 3    omega-3 fatty acids-vitamin e (Fish Oil) 1,000 mg cap, Take 1 Cap by mouth two (2) times a day., Disp: , Rfl:     aspirin 81 mg chewable tablet, Take 81 mg by mouth daily. , Disp: , Rfl:         Allergies   Allergen Reactions    Iodine Shortness of Breath    Ciprofloxacin Unknown (comments)    Hydrocodone Unknown (comments)    Lithium Unknown (comments)    Pravastatin Myalgia    Simvastatin Unknown (comments)    Wellbutrin [Bupropion Hcl] Unknown (comments)      MRI Results (most recent):  Results from Hospital Encounter encounter on 11/07/20   MRA NECK WO CONT    Narrative PRELIMINARY REPORT    There is an acute infarction in the mid kelly just to the left of midline. Results called by the technologist.    MRA of the [place] demonstrates small distal right vertebral artery. There is occlusion of the mid basilar artery. There is a right posterior  communicating artery. MRA of the neck demonstrates common carotid bifurcations to be patent. Vertebral  arteries are codominant. Results called by the technologist.  Preliminary report was provided by  , the on-call radiologist, at 465    Final report to follow. END PRELIMINARY REPORT                    Results from Hospital Encounter encounter on 11/07/20   MRA NECK WO CONT    Narrative PRELIMINARY REPORT    There is an acute infarction in the mid kelly just to the left of midline.   Results called by the technologist.    MRA of the [place] demonstrates small distal right vertebral artery. There is occlusion of the mid basilar artery. There is a right posterior  communicating artery. MRA of the neck demonstrates common carotid bifurcations to be patent. Vertebral  arteries are codominant. Results called by the technologist.  Preliminary report was provided by  , the on-call radiologist, at 631    Final report to follow. END PRELIMINARY REPORT                Review of Systems:  A comprehensive review of systems was negative except for: Constitutional: positive for fatigue and malaise  Eyes: positive for visual disturbance  Musculoskeletal: positive for myalgias, arthralgias and stiff joints  Neurological: positive for speech problems, coordination problems, gait problems and weakness  Behvioral/Psych: positive for anxiety   Vitals:    12/01/20 0957   BP: (!) 109/93   Pulse: 61   Temp: 97.4 °F (36.3 °C)   TempSrc: Temporal   SpO2: 94%   Weight: 195 lb (88.5 kg)   Height: 5' 5\" (1.651 m)     Objective:     I      NEUROLOGICAL EXAM:    Appearance: The patient is well developed, well nourished, mildly overweight, provides a coherent history and is in no acute distress. Mental Status: Oriented to time, place and person, and the president, cognitive function is normal and speech is fluent and no aphasia or dysarthria. Mood and affect appropriate, but anxious but does not appear that depressed. Cranial Nerves:   Intact visual fields. Fundi are benign, disc are flat, no lesions seen on funduscopy. CARROLL, EOM's full, no nystagmus, no ptosis. Facial sensation is normal. Corneal reflexes are not tested. Facial movement is symmetric. Hearing is normal bilaterally. Palate is midline with normal sternocleidomastoid and trapezius muscles are normal. Tongue is midline.   Neck without meningismus or bruits  Temporal arteries are not tender or enlarged  TMJ areas are not tender on palpation   Motor:  5/5 strength in upper and lower proximal and distal muscles. Normal bulk and tone. No fasciculations. Rapid alternating movement is symmetric and intact bilaterally   Reflexes:   Deep tendon reflexes 2+/4 and symmetrical.  No babinski or clonus present   Sensory:   Normal to touch, pinprick and vibration and temperature, except in both feet she has mild decreased to temperature and pin and vibration to ankle level. DSS is intact   Gait:  Normal gait for patient's age. Tremor:   No tremor noted. Cerebellar:  No abnormal cerebellar signs present on Romberg and tandem testing and finger-nose-finger exam.   Neurovascular:  Normal heart sounds and regular rhythm, peripheral pulses decreased, and no carotid bruits. Assessment:       ICD-10-CM ICD-9-CM    1. Left pontine stroke ([facility])  I63.50 434.91 PLATELET FUNCTION, VERIFY NOW P2Y12      HEMOGLOBIN A1C WITH EAG   2. Bilateral carotid artery stenosis  I65.23 433.10 PLATELET FUNCTION, VERIFY NOW P2Y12     433.30 HEMOGLOBIN A1C WITH EAG   3. Diabetic peripheral neuropathy associated with type 2 diabetes mellitus ([facility])  E11.42 250.60 PLATELET FUNCTION, VERIFY NOW P2Y12     357.2 HEMOGLOBIN A1C WITH EAG   4. Basilar artery occlusion with cerebral infarction ([facility])  I63.22 433.01 PLATELET FUNCTION, VERIFY NOW P2Y12      HEMOGLOBIN A1C WITH EAG     Active Problems:    * No active hospital problems. *      Plan:     Patient with new problem of worry about diet restrictions on, we have reassured her that she do not need to worry about that, that she needs to take the Plavix and cannot stop it because of her severe vertebrobasilar disease. We will check a platelet function test just to make sure she is in the therapeutic range on her Plavix. We talked to the daughters in detail about her condition neurologically, said that she looks stable, and that she needs to talk to her psychiatrist about her bipolar disorder.   We also advised the children that she needs to stay on the Plavix. Patient has had no further strokelike symptoms. Patient has a mid basilar artery occlusion and then reconstitution distally and severe genuine vascular disease and possibly an occluded right vertebral artery or stenosis, so patient will need dual antiplatelet therapy for a while due to her high risk of vascular disease and she was already on aspirin. We advised the patient that she has 3 of the 4 main risk factors for stroke as far as diabetes, high blood pressure, cholesterol, and that the fourth and smoking she should never smoke again. We talked to the daughter at length and the patient at length. Patient also has diabetic neuropathy in her feet, with decreased sensation in both feet, and her hemoglobin A1c was 6.2, we advised that she needs to control her blood sugars better to prevent the neuropathy from getting worse, because treating the diabetes is the single most important factor in controlling the neuropathy  Continue complete neurovascular work-up as ordered, and new PT and OT and speech, will follow with you. Patient has remained stable, no recurrent symptoms, on dual antiplatelet therapy and high-dose statin, and working on her latent diabetes also. Did advise him that she did indeed have a stroke in the left brainstem, and she has significant cerebrovascular disease that puts her at risk for recurrent disease, and she needs to modify her risk factors as far as blood pressure, diabetes, cholesterol, and she does not smoke, so hopefully she can control those with her PCP. She also was advised of the warning signs of stroke as far as be fast, as balance, eyes, face, arms, sensory in time, and to seek medical help immediately if there is any further problem. 35 minutes spent the patient ended daughters discussing her diagnosis prognosis and treatment options. Follow-up in 6 months time at which time we will check a carotid Doppler.   We will check hemoglobin A1c to further evaluate her diabetic control because she has not had that done recently, and check her platelet function test on Plavix to make sure she is therapeutically on antiplatelet therapy. Signed By: Rhonda Orellana MD     December 1, 2020       CC:  Aaron De La Torre, [address]: [phone]

## 2020-12-08 ENCOUNTER — HOSPITAL ENCOUNTER (OUTPATIENT)
Dept: LAB | Age: 75
Discharge: HOME | End: 2020-12-08
Payer: MEDICARE

## 2020-12-08 PROCEDURE — 85049 AUTOMATED PLATELET COUNT: CPT

## 2020-12-08 PROCEDURE — 83036 HEMOGLOBIN GLYCOSYLATED A1C: CPT

## 2020-12-08 PROCEDURE — 36415 COLL VENOUS BLD VENIPUNCTURE: CPT

## 2020-12-09 ENCOUNTER — PATIENT OUTREACH (OUTPATIENT)
Dept: CASE MANAGEMENT | Age: 75
End: 2020-12-09

## 2020-12-09 LAB
EST. AVERAGE GLUCOSE BLD GHB EST-MCNC: 126 MG/DL
HBA1C MFR BLD: 6 % (ref 4.8–5.6)
MORPHOLOGY BLD-IMP: ABNORMAL
PLATELET # BLD AUTO: 101 X10E3/UL (ref 150–450)

## 2020-12-09 NOTE — PROGRESS NOTES
Patient has graduated from the Transitions of Care Coordination  program on 12/9/2020. Patient/family has the ability to self-manage at this time Care management goals have been completed. Patient was not referred to the [place] team for further management. Goals Addressed                 This Visit's Progress     COMPLETED: Attend follow up appointments on schedule          11/24/20   Attended VV with PCP on 11/17   Will attend follow up appt with Dr. Rishabh Shields (pulmonary) scheduled 1/26   Will attend breathing test scheduled 1/15       COMPLETED: Prevent complications post hospitalization. 11/24/20   Reviewed BE FAST acronym        COMPLETED: Supportive resources in place to maintain patient in the community (ie. Home Health)          11/24/20   HH PT plans to discharge today   Reports walking for approximately 15 minutes working up to 30 minutes            Patient has Care Transition Nurse's contact information for any further questions, concerns, or needs.   Patients upcoming visits:    Future Appointments   Date Time Provider Bora Marin   6/15/2021 11:20 AM Jordan Angelucci, MD [facility]

## 2020-12-22 ENCOUNTER — TELEPHONE (OUTPATIENT)
Dept: NEUROLOGY | Age: 75
End: 2020-12-22

## 2020-12-22 NOTE — TELEPHONE ENCOUNTER
----- Message from [facility] sent at 12/22/2020  9:40 AM EST -----  Regarding: Dr. Alec Hess  General Message/Vendor Calls    Caller's first and last name:  Shelley Julien (Daughter)      Reason for call:  Cardiologist appointment      Callback required yes/no and why:  Y      Best contact number(s):  [phone]      Details to clarify the request:  Ms. Fatmata Saucedo is requesting a call back because the patient is having a myocardial perfusion imaging procedure tomorrow, 12/23/2020, at 12:30p.m. She needs to know if Dr. Jarrett Rosenberg is okay with the patient going through with this procedure because there is a radioactive imaging agent used, and the patient previously had a stroke. This is a 3- to 4-hour procedure, so they need to know whether Dr. Jarrett Rosenberg wants the patient to go ahead with the procedure or reschedule it for a later time. This is her third message, and she is still waiting for a phone call back.       [facility]

## 2021-02-23 ENCOUNTER — OFFICE VISIT (OUTPATIENT)
Dept: INTERNAL MEDICINE CLINIC | Age: 76
End: 2021-02-23
Payer: MEDICARE

## 2021-02-23 VITALS
RESPIRATION RATE: 16 BRPM | HEIGHT: 65 IN | BODY MASS INDEX: 30.16 KG/M2 | DIASTOLIC BLOOD PRESSURE: 74 MMHG | HEART RATE: 59 BPM | SYSTOLIC BLOOD PRESSURE: 102 MMHG | WEIGHT: 181 LBS | OXYGEN SATURATION: 95 % | TEMPERATURE: 97.3 F

## 2021-02-23 DIAGNOSIS — I63.22 BASILAR ARTERY OCCLUSION WITH CEREBRAL INFARCTION (HCC): ICD-10-CM

## 2021-02-23 DIAGNOSIS — I10 ESSENTIAL HYPERTENSION, BENIGN: Primary | ICD-10-CM

## 2021-02-23 DIAGNOSIS — E11.42 TYPE 2 DIABETES MELLITUS WITH DIABETIC POLYNEUROPATHY, WITHOUT LONG-TERM CURRENT USE OF INSULIN (HCC): ICD-10-CM

## 2021-02-23 DIAGNOSIS — I63.9 LEFT PONTINE STROKE (HCC): ICD-10-CM

## 2021-02-23 DIAGNOSIS — N39.0 URINARY TRACT INFECTION WITHOUT HEMATURIA, SITE UNSPECIFIED: ICD-10-CM

## 2021-02-23 DIAGNOSIS — I65.23 BILATERAL CAROTID ARTERY STENOSIS: ICD-10-CM

## 2021-02-23 DIAGNOSIS — E78.2 MIXED HYPERLIPIDEMIA: ICD-10-CM

## 2021-02-23 DIAGNOSIS — R82.998 FOAMY URINE: ICD-10-CM

## 2021-02-23 LAB
A-G RATIO,AGRAT: 1.8 RATIO
ALBUMIN SERPL-MCNC: 4.5 G/DL (ref 3.9–5.4)
ALP SERPL-CCNC: 77 U/L (ref 38–126)
ALT SERPL-CCNC: 15 U/L (ref 0–35)
ANION GAP SERPL CALC-SCNC: 11 MMOL/L
AST SERPL W P-5'-P-CCNC: 24 U/L (ref 14–36)
BACTERIA,BACTU: ABNORMAL
BILIRUB SERPL-MCNC: 0.8 MG/DL (ref 0.2–1.3)
BILIRUB UR QL: NEGATIVE
BUN SERPL-MCNC: 21 MG/DL (ref 7–17)
BUN/CREATININE RATIO,BUCR: 30 RATIO
CALCIUM SERPL-MCNC: 10 MG/DL (ref 8.4–10.2)
CHLORIDE SERPL-SCNC: 102 MMOL/L (ref 98–107)
CHOL/HDL RATIO,CHHD: 6 RATIO (ref 0–4)
CHOLEST SERPL-MCNC: 210 MG/DL (ref 0–200)
CLARITY: CLEAR
CO2 SERPL-SCNC: 30 MMOL/L (ref 22–32)
COLOR UR: ABNORMAL
CREAT SERPL-MCNC: 0.7 MG/DL (ref 0.7–1.2)
ERYTHROCYTE [DISTWIDTH] IN BLOOD BY AUTOMATED COUNT: 13.6 %
GLOBULIN,GLOB: 2.5
GLUCOSE 24H UR-MRATE: NEGATIVE G/(24.H)
GLUCOSE SERPL-MCNC: 85 MG/DL (ref 65–105)
HCT VFR BLD AUTO: 41.4 % (ref 37–51)
HDLC SERPL-MCNC: 36 MG/DL (ref 35–130)
HGB BLD-MCNC: 13.8 G/DL (ref 12–18)
HGB UR QL STRIP: NEGATIVE
KETONES UR QL STRIP.AUTO: NEGATIVE
LDL/HDL RATIO,LDHD: 4 RATIO
LDLC SERPL CALC-MCNC: 143 MG/DL (ref 0–130)
LEUKOCYTE ESTERASE: NEGATIVE
MCH RBC QN AUTO: 29.4 PG (ref 26–32)
MCHC RBC AUTO-ENTMCNC: 33.3 G/DL (ref 30–36)
MCV RBC AUTO: 88 FL (ref 80–97)
MICROALBUMIN, URINE: 20 MG/L (ref 0–20)
NITRITE UR QL STRIP.AUTO: NEGATIVE
PH UR STRIP: 6 [PH] (ref 5–7)
PLATELET # BLD AUTO: 118 K/UL (ref 140–440)
POTASSIUM SERPL-SCNC: 4.5 MMOL/L (ref 3.6–5)
PROT SERPL-MCNC: 7 G/DL (ref 6.3–8.2)
PROT UR STRIP-MCNC: NEGATIVE MG/DL
RBC # BLD AUTO: 4.7 M/UL (ref 4.2–6.3)
RBC #/AREA URNS HPF: ABNORMAL #/HPF
SODIUM SERPL-SCNC: 143 MMOL/L (ref 137–145)
SP GR UR REFRACTOMETRY: 1.01 (ref 1–1.03)
TRIGL SERPL-MCNC: 157 MG/DL (ref 0–200)
UROBILINOGEN UR QL STRIP.AUTO: NEGATIVE
VLDLC SERPL CALC-MCNC: 31 MG/DL
WBC # BLD AUTO: 4.3 K/UL (ref 4.1–10.9)
WBC URNS QL MICRO: 0 #/HPF

## 2021-02-23 PROCEDURE — 81001 URINALYSIS AUTO W/SCOPE: CPT | Performed by: INTERNAL MEDICINE

## 2021-02-23 PROCEDURE — 3046F HEMOGLOBIN A1C LEVEL >9.0%: CPT | Performed by: INTERNAL MEDICINE

## 2021-02-23 PROCEDURE — 1090F PRES/ABSN URINE INCON ASSESS: CPT | Performed by: INTERNAL MEDICINE

## 2021-02-23 PROCEDURE — 80061 LIPID PANEL: CPT | Performed by: INTERNAL MEDICINE

## 2021-02-23 PROCEDURE — 1100F PTFALLS ASSESS-DOCD GE2>/YR: CPT | Performed by: INTERNAL MEDICINE

## 2021-02-23 PROCEDURE — G8427 DOCREV CUR MEDS BY ELIG CLIN: HCPCS | Performed by: INTERNAL MEDICINE

## 2021-02-23 PROCEDURE — 2022F DILAT RTA XM EVC RTNOPTHY: CPT | Performed by: INTERNAL MEDICINE

## 2021-02-23 PROCEDURE — 3017F COLORECTAL CA SCREEN DOC REV: CPT | Performed by: INTERNAL MEDICINE

## 2021-02-23 PROCEDURE — G8752 SYS BP LESS 140: HCPCS | Performed by: INTERNAL MEDICINE

## 2021-02-23 PROCEDURE — 99204 OFFICE O/P NEW MOD 45 MIN: CPT | Performed by: INTERNAL MEDICINE

## 2021-02-23 PROCEDURE — G8754 DIAS BP LESS 90: HCPCS | Performed by: INTERNAL MEDICINE

## 2021-02-23 PROCEDURE — 80053 COMPREHEN METABOLIC PANEL: CPT | Performed by: INTERNAL MEDICINE

## 2021-02-23 PROCEDURE — G8536 NO DOC ELDER MAL SCRN: HCPCS | Performed by: INTERNAL MEDICINE

## 2021-02-23 PROCEDURE — G9717 DOC PT DX DEP/BP F/U NT REQ: HCPCS | Performed by: INTERNAL MEDICINE

## 2021-02-23 PROCEDURE — 82044 UR ALBUMIN SEMIQUANTITATIVE: CPT | Performed by: INTERNAL MEDICINE

## 2021-02-23 PROCEDURE — G8417 CALC BMI ABV UP PARAM F/U: HCPCS | Performed by: INTERNAL MEDICINE

## 2021-02-23 PROCEDURE — G8400 PT W/DXA NO RESULTS DOC: HCPCS | Performed by: INTERNAL MEDICINE

## 2021-02-23 PROCEDURE — 85027 COMPLETE CBC AUTOMATED: CPT | Performed by: INTERNAL MEDICINE

## 2021-02-23 PROCEDURE — 3288F FALL RISK ASSESSMENT DOCD: CPT | Performed by: INTERNAL MEDICINE

## 2021-02-23 NOTE — PATIENT INSTRUCTIONS
Taking Aspirin and Other Antiplatelets Safely: Care Instructions Your Care Instructions Aspirin and other antiplatelet medicines help prevent blood clots from forming. They can help some people lower their risk of a heart attack or stroke. But these medicines can also make you more likely to bleed. That's why it's important to talk to your doctor before you start taking aspirin every day. It's not right for everyone. And if you and your doctor decide these medicines are right for you, learn how to take them safely. If you take aspirin, be sure you know how to take it. Your doctor can tell you what dose to take and how often to take it. One low-dose aspirin is 81 milligrams (mg). But the dose for daily aspirin can range from 81 mg to 325 mg. If you take another antiplatelet, take it as prescribed. Follow-up care is a key part of your treatment and safety. Be sure to make and go to all appointments, and call your doctor if you are having problems. It's also a good idea to know your test results and keep a list of the medicines you take. How can you care for yourself at home? · Before you start to take daily aspirin or some other antiplatelet, tell your doctor all the medicines, vitamins, herbal products, and supplements you take. · Tell your doctors, dentist, and pharmacist that you take an antiplatelet. · Take your medicine as your doctor directs. Make sure that you understand exactly what your doctor wants you to do. If another doctor says to stop taking the medicine for any reason, talk to the doctor who prescribed it before you stop. · Take your medicine at the same time every day. · Do not chew or crush the coated or time-release forms of your medicine. · If you miss a dose, don't take an extra dose to make up for it. · Ask your doctor whether you can drink alcohol. And ask how much you can drink. When you take an antiplatelet, drinking too much raises your risk for liver damage and stomach bleeding. · If you are pregnant, are breastfeeding, or plan to become pregnant, talk to your doctor about what medicines are safe. · Talk with your doctor before you take a pain medicine. Many pain medicines have aspirin. Too much aspirin can be harmful. · Wear medical alert jewelry. This lets others know that you take an antiplatelet. You can buy it at most drugstores. · Try to avoid injuries that might make you bleed. For example, be careful when you exercise and when you play sports. Make your home safe to reduce your risk of falling. When should you call for help? Call 911 anytime you think you may need emergency care. For example, call if: 
  · You have a sudden, severe headache that is different from past headaches. Call your doctor now or seek immediate medical care if: 
  · You have any abnormal bleeding, such as: ? A nosebleed that you can't easily stop. ? Bloody or black stools, or rectal bleeding. ? Bloody or pink urine.  
  · You feel dizzy or lightheaded or feel like you may faint. Watch closely for changes in your health, and be sure to contact your doctor if you have any problems. Where can you learn more? Go to http://www.britt.com/ Enter Y757 in the search box to learn more about \"Taking Aspirin and Other Antiplatelets Safely: Care Instructions. \" Current as of: December 16, 2019               Content Version: 12.6 © 1830-4915 [employer] [place]. Care instructions adapted under license by [employer] (which disclaims liability or warranty for this information). If you have questions about a medical condition or this instruction, always ask your healthcare professional. [address] any warranty or liability for your use of this information.

## 2021-02-23 NOTE — PROGRESS NOTES
Blanca Dunn is a 76 y.o. female presenting for Establish Care  . 1. Have you been to the ER, urgent care clinic since your last visit? Hospitalized since your last visit? Yes When: 11/7/2020 Where: [facility] Reason for visit: stroke    2. Have you seen or consulted any other health care providers outside of the [address] [place] [address] since your last visit? Include any pap smears or colon screening. No    Fall Risk Assessment, last 12 mths 2/23/2021   Able to walk? Yes   Fall in past 12 months? 1   Do you feel unsteady? 0   Are you worried about falling 0   Is TUG test greater than 12 seconds? 0   Is the gait abnormal? 0   Number of falls in past 12 months 1   Fall with injury? 0         Abuse Screening Questionnaire 2/23/2021   Do you ever feel afraid of your partner? N   Are you in a relationship with someone who physically or mentally threatens you? N   Is it safe for you to go home?  Y       3 most recent PHQ Screens 10/26/2017   Little interest or pleasure in doing things Not at all   Feeling down, depressed, irritable, or hopeless Not at all   Total Score PHQ 2 0       Medications Discontinued During This Encounter   Medication Reason    DULoxetine (CYMBALTA) 30 mg capsule DUPLICATE ORDER

## 2021-02-23 NOTE — PROGRESS NOTES
Tito Buenrostro is a 76 y.o. female and presents with Establish Care      Subjective:  Patient comes in today to establish care. She is a new patient to our practice. She is in attendance with her daughter Chantell Santos. She used to work as a . Lives in [place] with her  who is a teacher as well. Patient has a recent history of left pontine infarct, severe vertebrobasilar stenotic disease and currently on Plavix. Follows up with neurology/Dr. Anabela Sosua. MRI of the brain shows a small left pontine infarct, and MRA of the brain shows severe posterior circulation disease with an occluded basilar disease, stenotic right vertebral artery and mild disease in the rest of the vessels. Post stroke has had some issues with unsteadiness of gait. Acute ischemic CVA in the left kelly POA  CT head was unremarkable, MRI of the brain showed left pontine ischemic CVA  MRI showed posterior vertebral artery occlusion  Echocardiogram showed  · LV: Estimated LVEF is 55 - 60%. Visually measured ejection fraction. Normal cavity size and systolic function (ejection fraction normal). Mild concentric hypertrophy. · TV: Mild tricuspid valve regurgitation is present. · PA: Pulmonary arterial systolic pressure is 30 mmHg. · Pericardium: Pericardial fat pad present. · Saline contrast was given to evaluate for intracardiac shunt. Was on aspirin and Plavix for 3 months and now Plavix alone. She is not on statin due to intolerance. Hypertriglyceridemiaon gemfibrozil. Hypertension  Hyperlipidemia/hypertriglyceridemia not on statin due to intolerance to multiple statins. She is on gemfibrozil. Type 2 diabetes with diabetic polyneuropathy. Last A1c 6. Bilateral carotid disease with cerebral infarctionon dual antiplatelet therapy, not on statin. Patient is noted foamy urine and is concerned about it.   She denies urinary frequency, burning micturition, suprapubic discomfort, fever or chills. Past Medical History:   Diagnosis Date    Allergic rhinitis, cause unspecified 1/17/2011    Asthma     Asthma     Bladder cancer ([facility] [address] 7/7/2010    Chronic airway obstruction, not elsewhere classified 1/17/2011    Depression 1/17/2011    Diverticulosis 7/7/2010    Encounter for long-term (current) use of other medications 1/17/2011    Essential hypertension, benign 11/30/2012    Hypercholesterolemia     Mixed hyperlipidemia 1/17/2011    Sleep apnea     CPAP     Past Surgical History:   Procedure Laterality Date    ENDOSCOPY, COLON, DIAGNOSTIC      HX CARPAL TUNNEL RELEASE      right    HX CHOLECYSTECTOMY      HX CYSTOCELE REPAIR      HX HYSTERECTOMY      HX ORTHOPAEDIC      left thumb surgery    HX UROLOGICAL      resection of bladder tumor; bladder sling    NH REPAIR OF RECTOCELE       Allergies   Allergen Reactions    Iodine Shortness of Breath    Ciprofloxacin Unknown (comments)    Hydrocodone Unknown (comments)    Lithium Unknown (comments)    Pravastatin Myalgia    Simvastatin Unknown (comments)    Wellbutrin [Bupropion Hcl] Unknown (comments)     Current Outpatient Medications   Medication Sig Dispense Refill    DULoxetine (Cymbalta) 60 mg capsule Take 60 mg by mouth daily.  clopidogreL (PLAVIX) 75 mg tab Take 1 Tab by mouth daily. 90 Tab 4    vitamin e (E GEMS) 1,000 unit capsule Take 1,000 Units by mouth daily.  gemfibroziL (LOPID) 600 mg tablet TAKE 1 TABLET BY MOUTH  TWICE DAILY 180 Tab 3    atenoloL (TENORMIN) 50 mg tablet TAKE 1.5 TABLET BY MOUTH  DAILY. (Appointment needs to be made for further refills) (Patient taking differently: TAKE 1 TABLET BY MOUTH  DAILY. (Appointment needs to be made for further refills)) 45 Tab 3    cholecalciferol (Vitamin D3) (1000 Units /25 mcg) tablet Take 1,000 Units by mouth daily.       montelukast (SINGULAIR) 10 mg tablet TAKE 1 TABLET BY MOUTH  DAILY 90 Tab 3    omega-3 fatty acids-vitamin e (Fish Oil) 1,000 mg cap Take 1 Cap by mouth two (2) times a day.  lysine (L-LYSINE) 500 mg Tab tablet Take 500 mg by mouth as needed.  fexofenadine (ALLEGRA) 180 mg tablet Take 180 mg by mouth daily.  TURMERIC PO Take  by mouth daily.  meloxicam (MOBIC) 15 mg tablet TAKE 1 TABLET BY MOUTH  DAILY 90 Tab 3    budesonide-formoterol (SYMBICORT) 160-4.5 mcg/actuation HFAA Take 2 Puffs by inhalation two (2) times a day. 3 Inhaler 3    clonazePAM (KLONOPIN) 1 mg tablet Take 1 Tab by mouth daily as needed (anxiety). 90 Tab 3    aspirin 81 mg chewable tablet Take 81 mg by mouth daily. Social History     Socioeconomic History    Marital status:      Spouse name: Not on file    Number of children: Not on file    Years of education: Not on file    Highest education level: Not on file   Tobacco Use    Smoking status: Never Smoker    Smokeless tobacco: Never Used   Substance and Sexual Activity    Alcohol use: No     Comment: occ. wine    Drug use: No    Sexual activity: Yes     Partners: Male     Family History   Problem Relation Age of Onset    Diabetes Mother     Heart Disease Mother     Elevated Lipids Mother     Cancer Mother         basal cell ca    Hypertension Father     Cancer Father         basal cell ca    Cancer Sister         breast    Breast Cancer Sister         46s       Review of Systems  A ten system review of constitutional, cardiovascular, respiratory, musculoskeletal, endocrine, skin, SHEENT, genitourinary, psychiatric and neurologic systems was obtained and is unremarkable with the exception of neck pain.     Objective:  Visit Vitals  /74 (BP 1 Location: Left upper arm, BP Patient Position: Sitting, BP Cuff Size: Adult)   Pulse (!) 59   Temp 97.3 °F (36.3 °C)   Resp 16   Ht 5' 5\" (1.651 m)   Wt 181 lb (82.1 kg)   SpO2 95%   BMI 30.12 kg/m²     Physical Exam:   General appearance - alert, well appearing, and in no distress  Mental status - alert, oriented to person, place, and time  EYE-CARROLL, EOMI, fundi normal, corneas normal, no foreign bodies  ENT-ENT exam normal, no neck nodes or sinus tenderness  Nose - normal and patent, no erythema, discharge or polyps  Mouth - mucous membranes moist, pharynx normal without lesions  Neck - supple, no significant adenopathy   Chest - clear to auscultation, no wheezes, rales or rhonchi, symmetric air entry   Heart - normal rate, regular rhythm, normal S1, S2, no murmurs, rubs, clicks or gallops   Abdomen - soft, nontender, nondistended, no masses or organomegaly  Lymph- no adenopathy palpable  Ext-peripheral pulses normal, no pedal edema, no clubbing or cyanosis  Skin-Warm and dry. no hyperpigmentation, vitiligo, or suspicious lesions  Neuro -alert, oriented, normal speech, no focal findings or movement disorder noted  Musculoskeletal- FROM, no bony abnormalities, no point tenderness    Lab Review:  Results for orders placed or performed in visit on 12/01/20   HEMOGLOBIN A1C WITH EAG   Result Value Ref Range    Hemoglobin A1c 6.0 (H) 4.8 - 5.6 %    Estimated average glucose 126 mg/dL   PLATELET COUNT   Result Value Ref Range    PLATELET 675 (L) 921 - 450 x10E3/uL    Hematology comments: Note:         Documenation Review:    Assessment/Plan:    Diagnoses and all orders for this visit:    1. Essential hypertension, benign  -     CBC W/O DIFF  -     METABOLIC PANEL, COMPREHENSIVE    2. Mixed hyperlipidemia  -     LIPID PANEL    3. Type 2 diabetes mellitus with diabetic polyneuropathy, without long-term current use of insulin (HCC)  -     HEMOGLOBIN A1C WITH EAG  -     URINE, MICROALBUMIN, SEMIQUANTITATIVE    4. Left pontine stroke ([address]    5. Basilar artery occlusion with cerebral infarction ([address]    6. Bilateral carotid artery stenosis    7. Foamy urine  -     URINALYSIS W/MICROSCOPIC  -     URINE, MICROALBUMIN, SEMIQUANTITATIVE      Noted records by Dr. Pierre Galvin.   Patient was supposed to be on aspirin and Plavix for 3 months and then continue Plavix indefinitely. She is not on statin secondary to intolerance to multiple statins. She is on gemfibrozil. Foamy urinewe will check UA  Continue current meds. I will call with lab results and make further recommendations or adjustments if necessary. Discussed lifestyle modifications including Na restriction, low carb/fat diet, weight reduction and exercise (at least a walking program). ICD-10-CM ICD-9-CM    1. Essential hypertension, benign  I10 401.1 CBC W/O DIFF      METABOLIC PANEL, COMPREHENSIVE   2. Mixed hyperlipidemia  E78.2 272.2 LIPID PANEL   3. Type 2 diabetes mellitus with diabetic polyneuropathy, without long-term current use of insulin ([facility])  E11.42 250.60 HEMOGLOBIN A1C WITH EAG     357.2 URINE, MICROALBUMIN, SEMIQUANTITATIVE   4. Left pontine stroke ([facility])  I63.50 434.91    5. Basilar artery occlusion with cerebral infarction ([facility])  I63.22 433.01    6. Bilateral carotid artery stenosis  I65.23 433.10      433.30    7. Foamy urine  R82.998 791.9 URINALYSIS W/MICROSCOPIC      URINE, MICROALBUMIN, SEMIQUANTITATIVE           Follow-up and Dispositions    · Return in about 3 months (around 5/23/2021) for follow up. I have reviewed with the patient details of the assessment and plan and all questions were answered. Relevent patient education was performed. Verbal and/or written instructions (see AVS) provided. The most recent lab findings were reviewed with the patient. Plan was discussed with patient who verbally expressed understanding. An After Visit Summary was printed and given to the patient.     Bill Ames MD

## 2021-02-24 LAB
EST. AVERAGE GLUCOSE BLD GHB EST-MCNC: 108 MG/DL
HBA1C MFR BLD: 5.4 % (ref 4.8–5.6)

## 2021-02-24 NOTE — PROGRESS NOTES
Trying to call patient to discuss lab results however going on voicemail. She needs to continue current meds. Please inform patient I check with Dr. Rosio Newman she needs to continue on Plavix. She was supposed to be on aspirin and Plavix for at least 3 months which she has. Triglycerides and A1c have improved however LDL still high not at goal.  I understand she is statin intolerant. We will discuss further during her future follow-up visit. Please update patient.

## 2021-02-25 LAB — BACTERIA UR CULT: NORMAL

## 2021-03-10 ENCOUNTER — TELEPHONE (OUTPATIENT)
Dept: NEUROLOGY | Age: 76
End: 2021-03-10

## 2021-03-10 NOTE — TELEPHONE ENCOUNTER
----- Message from Andriy Alonso sent at 3/10/2021  9:12 AM EST -----  Regarding: Dr. Sam Holland telephone  General Message/Vendor Calls    Caller's first and last name:      Reason for call: clearance to drive       Callback required yes/no and why: yes       Best contact number(s): [phone]      Details to clarify the request: pt is wanting to know when she will be cleared to drive      Andriy Muss

## 2021-03-10 NOTE — TELEPHONE ENCOUNTER
----- Message from Estrella Hart sent at 3/10/2021  9:37 AM EST -----  Regarding: Dr. Milton John  General Message/Vendor Calls    Caller's first and last name: Pt      Reason for call: MRI order      Callback required yes/no and why: Yes      Best contact number(s): [phone]      Details to clarify the request: Pt was seen by Dr. Jp Salamanca on 12/01/2020 and pt states it was discussed that an MRI would be ordered in 6 months from her last MRI which was performed on 11/07/2020. Pt is scheduled to see Dr. Jp Salamanca on 06/15/21, but there is no order for an MRI and she did not know if this needed to be ordered and scheduled before she saw him or what he wanted her to do. She would also like to know when she can be released to drive. Please advise.       Estrella Hart

## 2021-03-16 ENCOUNTER — TELEPHONE (OUTPATIENT)
Dept: NEUROLOGY | Age: 76
End: 2021-03-16

## 2021-03-16 NOTE — TELEPHONE ENCOUNTER
I called the patient, told her she should drive in May 7, and if she needs clearance, she should call the office and get an appointment to see us in the of April so we can clear her to drive 1 May    [place], can you change her appointment from June to the end of April

## 2021-03-16 NOTE — TELEPHONE ENCOUNTER
Please call, she needs to know when she can drive again. She had a stroke November 7th. Also needs to know to schedule her MRI for May 15th.

## 2021-04-27 ENCOUNTER — VIRTUAL VISIT (OUTPATIENT)
Dept: NEUROLOGY | Age: 76
End: 2021-04-27
Payer: MEDICARE

## 2021-04-27 DIAGNOSIS — E78.2 MIXED HYPERLIPIDEMIA: ICD-10-CM

## 2021-04-27 DIAGNOSIS — I10 ESSENTIAL HYPERTENSION, BENIGN: ICD-10-CM

## 2021-04-27 DIAGNOSIS — I63.22 BASILAR ARTERY OCCLUSION WITH CEREBRAL INFARCTION (HCC): ICD-10-CM

## 2021-04-27 DIAGNOSIS — I63.9 LEFT PONTINE STROKE (HCC): Primary | ICD-10-CM

## 2021-04-27 PROBLEM — R56.9 SEIZURE (HCC): Status: RESOLVED | Noted: 2020-11-07 | Resolved: 2021-04-27

## 2021-04-27 PROCEDURE — G8756 NO BP MEASURE DOC: HCPCS | Performed by: PSYCHIATRY & NEUROLOGY

## 2021-04-27 PROCEDURE — 3288F FALL RISK ASSESSMENT DOCD: CPT | Performed by: PSYCHIATRY & NEUROLOGY

## 2021-04-27 PROCEDURE — 1090F PRES/ABSN URINE INCON ASSESS: CPT | Performed by: PSYCHIATRY & NEUROLOGY

## 2021-04-27 PROCEDURE — G8400 PT W/DXA NO RESULTS DOC: HCPCS | Performed by: PSYCHIATRY & NEUROLOGY

## 2021-04-27 PROCEDURE — G8417 CALC BMI ABV UP PARAM F/U: HCPCS | Performed by: PSYCHIATRY & NEUROLOGY

## 2021-04-27 PROCEDURE — G8536 NO DOC ELDER MAL SCRN: HCPCS | Performed by: PSYCHIATRY & NEUROLOGY

## 2021-04-27 PROCEDURE — 1100F PTFALLS ASSESS-DOCD GE2>/YR: CPT | Performed by: PSYCHIATRY & NEUROLOGY

## 2021-04-27 PROCEDURE — G8427 DOCREV CUR MEDS BY ELIG CLIN: HCPCS | Performed by: PSYCHIATRY & NEUROLOGY

## 2021-04-27 PROCEDURE — G9717 DOC PT DX DEP/BP F/U NT REQ: HCPCS | Performed by: PSYCHIATRY & NEUROLOGY

## 2021-04-27 PROCEDURE — 99215 OFFICE O/P EST HI 40 MIN: CPT | Performed by: PSYCHIATRY & NEUROLOGY

## 2021-04-27 NOTE — PROGRESS NOTES
[facility]LE[facility]ALTH Virtual FOLLOW-UP VISIT        Fermin Cole is a 68 y.o. female who was seen by synchronous (real-time) audio-video technology on 4/27/2021. Fermin Cole is a 68 y.o. female who presents today for the following:  Chief Complaint   Patient presents with    Follow-up    Stroke     11/2020         ASSESSMENT AND PLAN  Patient is known to this practice. This is my first time seeing the patient. Chart and history reviewed in detail at today's office visit. Status post left pontine stroke with fairly severe basal artery stenosis  Repeat MRA  Continue on Plavix and gemfibrozil  Continue to manage comorbid conditions to include hypertension to help reduce risk of recurrent stroke    goal less than 70   Hemoglobin A1c now down to 5.4 as of February 23, 2021 [was 6.0-6.2 at the end of 2020]        ICD-10-CM ICD-9-CM    1. Left pontine stroke (HCC)  I63.50 434.91 MRA BRAIN WO CONT   2. 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 Mixed hyperlipidemia  E78.2 272.2    4. Essential hypertension, benign  I10 401.1          I attest that 40 minutes was spent on today's visit reviewing medical records and diagnostic testing deemed pertinent to this patient's care, along with direct time spent at patient's visit including the history, physical assessment and plan, discussing diagnosis and management along with documentation.       HPI  Historical Data  Patient is known to the practice and was previously seen by Dr Fadumo Royal    Neurologic diagnosis  Stroke: November 7, 2020   Pontine    RT sided weakness  Severe vertebrobasilar stenotic disease   Dual platelet therapy: Plavix and aspirin and high-dose statin    Other significant comorbid conditions/concerns  Bipolar  Hypertension  Diabetes  Cholesterol  Asthma  History of bladder cancer  COPD  Sleep apnea on CPAP    Interim Data:     Status post pontine CVA    Patient denies any new or worsening signs or symptoms consistent with cerebrovascular insufficiency since last evaluated   Patient states she has no residual symptoms and is back to normal  Pt continues on his prescribed medication of Plavix and gemfibrozil and denies side effects   Aspirin was discontinued after 30 days from stroke  Patient continues to work to keep risk factor comorbid conditions under good control        Pertinent diagnostic data    Results from [place] encounter on 11/07/20   MRA NECK WO CONT    Impression IMPRESSION:  1. Small focus of acute infarction in the left kelly. 2.  No significant carotid stenosis. 3.  Focal high-grade stenosis/occlusion of the mid basilar artery. 4.  Well-developed right posterior communicating artery. MRA BRAIN WO CONT    Impression IMPRESSION:  1. Small focus of acute infarction in the left kelly. 2.  No significant carotid stenosis. 3.  Focal high-grade stenosis/occlusion of the mid basilar artery. 4.  Well-developed right posterior communicating artery. MRI BRAIN WO CONT    Impression IMPRESSION:  1. Small focus of acute infarction in the left kelly. 2.  No significant carotid stenosis. 3.  Focal high-grade stenosis/occlusion of the mid basilar artery. 4.  Well-developed right posterior communicating artery. Allergies   Allergen Reactions    Iodine Shortness of Breath    Ciprofloxacin Unknown (comments)    Hydrocodone Unknown (comments)    Lithium Unknown (comments)    Pravastatin Myalgia    Simvastatin Unknown (comments)    Wellbutrin [Bupropion Hcl] Unknown (comments)       Current Outpatient Medications   Medication Sig    montelukast (SINGULAIR) 10 mg tablet TAKE 1 TABLET BY MOUTH  DAILY    DULoxetine (CYMBALTA) 30 mg capsule TAKE 1 CAPSULE BY MOUTH  DAILY    clopidogreL (PLAVIX) 75 mg tab Take 1 Tab by mouth daily.     gemfibroziL (LOPID) 600 mg tablet TAKE 1 TABLET BY MOUTH  TWICE DAILY    atenoloL (TENORMIN) 50 mg tablet TAKE 1.5 TABLET BY MOUTH  DAILY. (Appointment needs to be made for further refills) (Patient taking differently: TAKE 1 TABLET BY MOUTH  DAILY. (Appointment needs to be made for further refills))    cholecalciferol (Vitamin D3) (1000 Units /25 mcg) tablet Take 1,000 Units by mouth daily.  omega-3 fatty acids-vitamin e (Fish Oil) 1,000 mg cap Take 1 Cap by mouth two (2) times a day.  lysine (L-LYSINE) 500 mg Tab tablet Take 500 mg by mouth as needed.  fexofenadine (ALLEGRA) 180 mg tablet Take 180 mg by mouth daily.  DULoxetine (Cymbalta) 60 mg capsule Take 60 mg by mouth daily.  vitamin e (E GEMS) 1,000 unit capsule Take 1,000 Units by mouth daily. No current facility-administered medications for this visit. Past medical history/surgical history, family history, and social history have been reviewed for today's visit      ROS    A ten system review of constitutional, cardiovascular, respiratory, musculoskeletal, endocrine, skin, SHEENT, genitourinary, psychiatric and neurologic systems was obtained and is unremarkable except as mentioned under HPI          EXAMINATION: Within the context of virtual telehealth visit: Was limited due to technical difficulties    General appearance: Patient is well-developed and well-nourished in no apparent distress and well groomed.     Psych/mental health:  Affect: Appropriate    PHQ  3 most recent PHQ Screens 10/26/2017   Little interest or pleasure in doing things Not at all   Feeling down, depressed, irritable, or hopeless Not at all   Total Score PHQ 2 0           HEENT:   Normocephalic  With evidence of trauma: No  Full range of motion head neck: Yes  Tenderness to palpation of the head neck region: N/A      Cardiovascular:     Extremities warm to touch: N/A  Extremity swelling: No  Discoloration: No  Evidence of PVD: No    Respiratory:   Dyspnea on exertion: No   Abnormal effort on casual observation: No   Use of portable oxygen: No   Evidence of cyanosis: No     Musculoskeletal:   Evidence of significant bone deformities: No   Spinal curvature: No     Integumentary:    Obvious bruising: No   Lacerations or discoloration on casual observation: No       Neurological Examination:   Mental Status:        MMSE  No flowsheet data found. Formal testing was not completed    there was nothing concerning on general observation and discussion. Alert oriented and appropriate to general conversation  Normal processing on general observation  Followed conversation and responded seemingly appropriate throughout the office visit  No word finding difficulties noted on casual observation  Able to follow directions without difficulty     Cranial Nerves:    Grossly intact    Motor:   Grossly intact      Sensation: Not tested    Coordination/Cerebellar:   Grossly intact    Gait: Not tested    Fall risk assessment  Fall Risk Assessment, last 12 mths 2/23/2021   Able to walk? Yes   Fall in past 12 months? 1   Do you feel unsteady? 0   Are you worried about falling 0   Is TUG test greater than 12 seconds? 0   Is the gait abnormal? 0   Number of falls in past 12 months 1   Fall with injury? 0           Due to this being a TeleHealth evaluation, many elements of the physical examination are unable to be assessed. Pursuant to the emergency declaration under the [facility], [facility] waiver authority and the [employer] Act, this Virtual  Visit was conducted, with patient's consent, to reduce the patient's risk of exposure to COVID-19 and provide continuity of care for an established patient. Services were provided through a video synchronous discussion virtually to substitute for in-person clinic visit. Follow-up and Dispositions    · Return in about 6 weeks (around 6/8/2021) for Virtual visit.            Luis Rivers MS, ANP-BC, [facility]

## 2021-04-27 NOTE — PATIENT INSTRUCTIONS
As per our discussion Continue on Plavix and continuing her statin continue to follow-up with primary care regarding management of blood pressure and your other medical conditions MRA of the brain will be ordered to compare against the previous MRA to evaluate the integrity of the vascular system and any changes We will see you back in approximately 6 weeks to review the results of the newest MRA however if this test has not been completed I then please reschedule the appointment so we have the results In the meantime continue activity as tolerated Again as we talked about you can resume driving on May 7, 1963 providing there are no further breakthrough issues or medical problems. There is nothing special you need to do as there was nothing reported to the [facility] and no paperwork to fill out. Thank you for following protocols and regulations for the past 6 months related to the driving I strongly encourage you to use my chart if you need to contact us. Presently with a high utilization of telehealth it is very difficult to get through on her phone lines. If you have not already activated my chart or you forget your password their instructions in this packet to get my chart activated. Office Policies 
 
o Phone calls/patient messages: Please allow up to 24 hours for someone in the office to contact you about your call or message. Be mindful your provider may be out of the office or your message may require further review. We encourage you to use [employer] for your messages as this is a faster, more efficient way to communicate with our office 
 
o Medication Refills: 
Prescription medications require up to 48 business hours to process. We encourage you to use [employer] for your refills. For controlled medications: Please allow up to 72 business hours to process. Certain medications may require you to  a written prescription at our office.  
 
NO narcotic/controlled medications will be prescribed after 4pm Monday through Friday or on weekends 
 
o Form/Paperwork Completion: We ask that you allow 7-14 business days. You may also download your forms to [employer] to have your doctor print off.

## 2021-05-21 ENCOUNTER — HOSPITAL ENCOUNTER (OUTPATIENT)
Dept: MRI IMAGING | Age: 76
Discharge: HOME | End: 2021-05-21
Payer: MEDICARE

## 2021-05-21 DIAGNOSIS — I63.9 LEFT PONTINE STROKE (HCC): ICD-10-CM

## 2021-05-21 DIAGNOSIS — I63.22 BASILAR ARTERY OCCLUSION WITH CEREBRAL INFARCTION (HCC): ICD-10-CM

## 2021-05-21 PROCEDURE — 70544 MR ANGIOGRAPHY HEAD W/O DYE: CPT

## 2021-06-10 ENCOUNTER — OFFICE VISIT (OUTPATIENT)
Dept: NEUROLOGY | Age: 76
End: 2021-06-10
Payer: MEDICARE

## 2021-06-10 VITALS
SYSTOLIC BLOOD PRESSURE: 120 MMHG | OXYGEN SATURATION: 98 % | BODY MASS INDEX: 30.02 KG/M2 | WEIGHT: 180.2 LBS | RESPIRATION RATE: 16 BRPM | HEIGHT: 65 IN | HEART RATE: 58 BPM | DIASTOLIC BLOOD PRESSURE: 78 MMHG

## 2021-06-10 DIAGNOSIS — I63.22 BASILAR ARTERY OCCLUSION WITH CEREBRAL INFARCTION (HCC): ICD-10-CM

## 2021-06-10 DIAGNOSIS — I63.9 LEFT PONTINE STROKE (HCC): Primary | ICD-10-CM

## 2021-06-10 DIAGNOSIS — I10 ESSENTIAL HYPERTENSION, BENIGN: ICD-10-CM

## 2021-06-10 DIAGNOSIS — E78.2 MIXED HYPERLIPIDEMIA: ICD-10-CM

## 2021-06-10 PROCEDURE — G8400 PT W/DXA NO RESULTS DOC: HCPCS | Performed by: PSYCHIATRY & NEUROLOGY

## 2021-06-10 PROCEDURE — G9717 DOC PT DX DEP/BP F/U NT REQ: HCPCS | Performed by: PSYCHIATRY & NEUROLOGY

## 2021-06-10 PROCEDURE — G8427 DOCREV CUR MEDS BY ELIG CLIN: HCPCS | Performed by: PSYCHIATRY & NEUROLOGY

## 2021-06-10 PROCEDURE — 99215 OFFICE O/P EST HI 40 MIN: CPT | Performed by: PSYCHIATRY & NEUROLOGY

## 2021-06-10 PROCEDURE — G8536 NO DOC ELDER MAL SCRN: HCPCS | Performed by: PSYCHIATRY & NEUROLOGY

## 2021-06-10 PROCEDURE — 1090F PRES/ABSN URINE INCON ASSESS: CPT | Performed by: PSYCHIATRY & NEUROLOGY

## 2021-06-10 PROCEDURE — G8752 SYS BP LESS 140: HCPCS | Performed by: PSYCHIATRY & NEUROLOGY

## 2021-06-10 PROCEDURE — G8417 CALC BMI ABV UP PARAM F/U: HCPCS | Performed by: PSYCHIATRY & NEUROLOGY

## 2021-06-10 PROCEDURE — 3288F FALL RISK ASSESSMENT DOCD: CPT | Performed by: PSYCHIATRY & NEUROLOGY

## 2021-06-10 PROCEDURE — 1100F PTFALLS ASSESS-DOCD GE2>/YR: CPT | Performed by: PSYCHIATRY & NEUROLOGY

## 2021-06-10 PROCEDURE — G8754 DIAS BP LESS 90: HCPCS | Performed by: PSYCHIATRY & NEUROLOGY

## 2021-06-10 NOTE — PATIENT INSTRUCTIONS
Continue on Plavix 75mg and gemfibrozil Continue to keep you other medical conditions under good control And just to recap you definitely did have a stroke some of those precursor issues were small TIAs building up to the stroke You have recovered without any residual effects which is also you definitely did have a stroke We will see you back in a year at which time we will get you scheduled again for repeat MRA If you need to get a hold of me prior to next visit the easiest most efficient method is through [address] Office Policies 
 
o Phone calls/patient messages: Please allow up to 24 hours for someone in the office to contact you about your call or message. Be mindful your provider may be out of the office or your message may require further review. We encourage you to use [employer] for your messages as this is a faster, more efficient way to communicate with our office 
 
o Medication Refills: 
Prescription medications require up to 48 business hours to process. We encourage you to use [employer] for your refills. For controlled medications: Please allow up to 72 business hours to process. Certain medications may require you to  a written prescription at our office. NO narcotic/controlled medications will be prescribed after 4pm Monday through Friday or on weekends 
 
o Form/Paperwork Completion: We ask that you allow 7-14 business days. You may also download your forms to [employer] to have your doctor print off. She is good to go Learning About How to Prevent a Stroke What is a stroke? A stroke occurs when a blood vessel in the brain bursts or is blocked by a blood clot. Without blood and the oxygen it carries, part of the brain starts to die. The part of the body controlled by the damaged area of the brain can't work properly. But there are many things you can do to help lower your stroke risk. What increases your risk for stroke?  
A risk factor is anything that makes you more likely to have a particular health problem. Risk factors for stroke that you can treat or change include: 
· Health problems like high blood pressure, atrial fibrillation, diabetes, and high cholesterol. · Smoking. · Heavy use of alcohol. · Being overweight. · Not getting enough physical activity. Risk factors you can't change include: · Age. The risk of stroke goes up as you get older. · Race.  Americans, Native Americans, and [place] Natives have a higher risk than those of other races. · Being female. Women have a higher risk of stroke than men. · Family history of stroke. Your doctor can help you know your risk. Then you and your can doctor talk about whether you need to lower it. What can you do to prevent a stroke? · Treat any health problems you have that raise your risk. · Adopt a heart-healthy lifestyle: ? Don't smoke. If you need help quitting, talk to your doctor about stop-smoking programs and medicines. These can increase your chances of quitting for good. ? Limit alcohol to 2 drinks a day for men and 1 drink a day for women. ? Stay at a healthy weight. Lose weight if you need to. 
? If your doctor recommends it, get more exercise. Walking is a good choice. Bit by bit, increase the amount you walk every day. Try for at least 30 minutes on most days of the week. ? Eat heart-healthy foods. These include fruits, vegetables, high-fiber foods, and fish. Choose foods that are low in sodium, saturated fat, and trans fat. · Decide with your doctor whether you will also take medicines to help lower your risk. For example, you and your doctor may decide you will take a medicine that prevents blood clots. What are the symptoms of a stroke? Symptoms of a stroke happen quickly. A stroke may cause: 
· Sudden numbness, tingling, weakness, or loss of movement in your face, arm, or leg, especially on only one side of your body. · Sudden vision changes. · Sudden trouble speaking.  
· Sudden confusion or trouble understanding simple statements. · Sudden problems with walking or balance. · A sudden, severe headache that is different from past headaches. FAST is a simple way to remember the main symptoms of stroke. Recognizing these symptoms helps you know when to call for medical help. FAST stands for: · F ace drooping. · A rm weakness. · S peech difficulty. · T samaria to call 911. It's important to call for medical help if you have stroke symptoms. Quick treatment may save your life. And it may reduce the damage in your brain so that you have fewer problems after the stroke. Follow-up care is a key part of your treatment and safety. Be sure to make and go to all appointments, and call your doctor if you are having problems. It's also a good idea to know your test results and keep a list of the medicines you take. Where can you learn more? Go to http://www.gray.com/ Enter G757 in the search box to learn more about \"Learning About How to Prevent a Stroke. \" Current as of: March 4, 2020               Content Version: 12.8 © 6351-7312 [employer]. Care instructions adapted under license by [employer] (which disclaims liability or warranty for this information). If you have questions about a medical condition or this instruction, always ask your healthcare professional. [place] [address] any warranty or liability for your use of this information. Learning About FAST: Stroke Warning Signs What is FAST? FAST is a simple way to remember the main symptoms of stroke. Recognizing these symptoms helps you know when to call for medical help. FAST stands for: · F ace drooping. · A rm weakness. · S peech difficulty. · T samaria to call 911. Other stroke symptoms can include sudden confusion, a severe headache, and problems with vision or balance. What happens when you have a stroke?  
A stroke occurs when a blood vessel to the brain bursts or is blocked by a blood clot. Within minutes, the nerve cells in that part of the brain die. As a result, the part of the body controlled by those cells cannot work properly. The effects of a stroke may range from mild to severe. They may get better, or they may last the rest of your life. A stroke can affect vision, speech, behavior, thought processes, and your ability to move. It can cause symptoms that may include: 
· Sudden numbness, tingling, weakness, or loss of movement in your face, arm, or leg, especially on only one side of your body. · Sudden vision changes. · Sudden trouble speaking. · Sudden confusion or trouble understanding simple statements. · Sudden problems with walking or balance. · A sudden, severe headache that is different from past headaches. Why is it important to get help FAST? Quick treatment may save your life. And it may reduce the damage in your brain so that you have fewer problems after the stroke. When you know stroke symptoms, you will know when it's important to call for medical help. Where can you learn more? Go to http://www.gray.com/ Enter K035 in the search box to learn more about \"Learning About FAST: Stroke Warning Signs. \" Current as of: March 4, 2020               Content Version: 12.8 © 2006-2021 [employer]. Care instructions adapted under license by [employer] (which disclaims liability or warranty for this information). If you have questions about a medical condition or this instruction, always ask your healthcare professional. [place] [address] any warranty or liability for your use of this information.

## 2021-06-10 NOTE — PROGRESS NOTES
[facility] 83  In Office FOLLOW-UP VISIT         Sharita Hale is a 68 y.o. female who presents today for the following:  Chief Complaint   Patient presents with    Follow-up     Results          ASSESSMENT AND PLAN  Status post left pontine stroke with fairly severe basal artery stenosis  Repeat MRA without change  Continue on Plavix and gemfibrozil  Continue to manage comorbid conditions to include hypertension to help reduce risk of recurrent stroke              goal less than 70             Hemoglobin A1c now down to 5.4 as of February 23, 2021 [was 6.0-6.2 at the end of 2020]    Education regarding risk factors of stroke versus TIA were discussed today and written information was given as well    Repeat MRA will be completed 1 year from now prior to the patient's office visit    Patient and/or family was given time to ask questions and voice concerns. I believe all questions concerns were adequately addressed at this  office visit. Patient and/or family also verbalized agreement and understanding of the above-stated plan        ICD-10-CM ICD-9-CM    1. Left pontine stroke (HCC)  I63.50 434.91    2. 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 Mixed hyperlipidemia  E78.2 272.2    4. Essential hypertension, benign  I10 401.1          I attest that 40 minutes was spent on today's visit reviewing medical records and diagnostic testing deemed pertinent to this patient's care, along with direct time spent at patient's visit including the history, physical assessment and plan, discussing diagnosis and management along with documentation.       HPI  Historical Data  Patient is known to the practice and was previously seen by Dr Virginia Angelo     Neurologic diagnosis  Stroke: November 7, 2020             Pontine              RT sided weakness  Severe vertebrobasilar stenotic disease             Dual platelet therapy: Plavix and aspirin and high-dose statin     Other significant comorbid conditions/concerns  Bipolar  Hypertension  Diabetes  Cholesterol  Asthma  History of bladder cancer  COPD  Sleep apnea on CPAP     Interim Data:     Status post pontine CVA     Patient denies any new or worsening signs or symptoms consistent with cerebrovascular insufficiency since last evaluated             Patient states she has no residual symptoms and is back to normal  Pt continues on his prescribed medication of Plavix and gemfibrozil and denies side effects             Aspirin was discontinued after 30 days from stroke  Patient continues to work to keep risk factor comorbid conditions under good control          Results from Hospital Encounter encounter on 05/21/21    MRA BRAIN WO CONT    Impression  No significant interval change. Stable high-grade stenosis/occlusion of the mid basilar artery. Results from [place] encounter on 11/07/20    MRA NECK WO CONT    Impression  IMPRESSION:  1. Small focus of acute infarction in the left kelly. 2.  No significant carotid stenosis. 3.  Focal high-grade stenosis/occlusion of the mid basilar artery. 4.  Well-developed right posterior communicating artery. MRA BRAIN WO CONT    Impression  IMPRESSION:  1. Small focus of acute infarction in the left kelly. 2.  No significant carotid stenosis. 3.  Focal high-grade stenosis/occlusion of the mid basilar artery. 4.  Well-developed right posterior communicating artery.       Allergies   Allergen Reactions    Iodine Shortness of Breath    Ciprofloxacin Unknown (comments)    Hydrocodone Unknown (comments)    Lithium Unknown (comments)    Pravastatin Myalgia    Simvastatin Unknown (comments)    Wellbutrin [Bupropion Hcl] Unknown (comments)       Current Outpatient Medications   Medication Sig    montelukast (SINGULAIR) 10 mg tablet TAKE 1 TABLET BY MOUTH  DAILY    DULoxetine (CYMBALTA) 30 mg capsule TAKE 1 CAPSULE BY MOUTH  DAILY    DULoxetine (Cymbalta) 60 mg capsule Take 60 mg by mouth daily.  clopidogreL (PLAVIX) 75 mg tab Take 1 Tab by mouth daily.  vitamin e (E GEMS) 1,000 unit capsule Take 1,000 Units by mouth daily.  gemfibroziL (LOPID) 600 mg tablet TAKE 1 TABLET BY MOUTH  TWICE DAILY    atenoloL (TENORMIN) 50 mg tablet TAKE 1.5 TABLET BY MOUTH  DAILY. (Appointment needs to be made for further refills) (Patient taking differently: TAKE 1 TABLET BY MOUTH  DAILY. (Appointment needs to be made for further refills))    cholecalciferol (Vitamin D3) (1000 Units /25 mcg) tablet Take 1,000 Units by mouth daily.  omega-3 fatty acids-vitamin e (Fish Oil) 1,000 mg cap Take 1 Cap by mouth two (2) times a day.  lysine (L-LYSINE) 500 mg Tab tablet Take 500 mg by mouth as needed.  fexofenadine (ALLEGRA) 180 mg tablet Take 180 mg by mouth daily. No current facility-administered medications for this visit. Past medical history/surgical history, family history, and social history have been reviewed for today's visit      ROS    A ten system review of constitutional, cardiovascular, respiratory, musculoskeletal, endocrine, skin, SHEENT, genitourinary, psychiatric and neurologic systems was obtained and is unremarkable except as mentioned under HPI          EXAMINATION:     Visit Vitals  /78 (BP 1 Location: Left arm, BP Patient Position: Sitting, BP Cuff Size: Adult)   Pulse (!) 58   Resp 16   Ht 5' 5\" (1.651 m)   Wt 180 lb 3.2 oz (81.7 kg)   SpO2 98%   BMI 29.99 kg/m²         General appearance: Patient is well-developed and well-nourished in no apparent distress and well groomed.     Psych/mental health:  Affect: Appropriate    PHQ  3 most recent PHQ Screens 10/26/2017   Little interest or pleasure in doing things Not at all   Feeling down, depressed, irritable, or hopeless Not at all   Total Score PHQ 2 0       HEENT:   Normocephalic  With evidence of trauma: No  Full range of motion head neck: Yes  Tenderness to palpation of the head neck region: No      Cardiovascular:     Extremities warm to touch: Yes  Extremity swelling: No  Discoloration: No  Evidence of PVD: No    Respiratory:   Dyspnea on exertion: No   Abnormal effort on casual observation: No   Use of portable oxygen: No   Evidence of cyanosis: No     Musculoskeletal:   Evidence of significant bone deformities: No   Spinal curvature: No     Integumentary:    Obvious bruising: No   Lacerations or discoloration on casual observation: No       Neurological Examination:   Mental Status:        MMSE  No flowsheet data found. Formal testing was not completed    there was nothing concerning on general observation and discussion. Alert oriented and appropriate to general conversation  Normal processing on general observation  Followed conversation and responded seemingly appropriate throughout the office visit  No word finding difficulties noted on casual observation  Able to follow directions without difficulty     Cranial Nerves:    Grossly intact    Motor:   Normal bulk  No tremor appreciated on today's exam  No abnormal movements appreciated on today's exam  Moves extremities spontaneously and with purpose        Sensation: Intact to light touch    Coordination/Cerebellar:   Grossly intact    Gait: Ambulates independently    Reflexes: Not tested    Fall risk assessment  Fall Risk Assessment, last 12 mths 6/10/2021   Able to walk? Yes   Fall in past 12 months? 0   Do you feel unsteady? 0   Are you worried about falling 0   Is TUG test greater than 12 seconds? -   Is the gait abnormal? -   Number of falls in past 12 months -   Fall with injury? -           Follow-up and Dispositions    · Return in about 1 year (around 6/10/2022) for In office appointment.            Jenna Saha MS, ANP-BC, [facility]

## 2021-06-29 ENCOUNTER — TRANSCRIBE ORDER (OUTPATIENT)
Dept: REGISTRATION | Age: 76
End: 2021-06-29

## 2021-06-29 ENCOUNTER — HOSPITAL ENCOUNTER (OUTPATIENT)
Dept: GENERAL RADIOLOGY | Age: 76
Discharge: HOME | End: 2021-06-29
Payer: MEDICARE

## 2021-06-29 DIAGNOSIS — R91.8 PULMONARY NODULES: Primary | ICD-10-CM

## 2021-06-29 DIAGNOSIS — R91.8 PULMONARY NODULES: ICD-10-CM

## 2021-06-29 PROCEDURE — 71046 X-RAY EXAM CHEST 2 VIEWS: CPT

## 2021-11-23 ENCOUNTER — TELEPHONE (OUTPATIENT)
Dept: FAMILY MEDICINE CLINIC | Age: 76
End: 2021-11-23

## 2021-11-23 NOTE — TELEPHONE ENCOUNTER
Discussed recommendationns for vaccinated pts with covid exposure. Advised to be tested if sx develop,otherwise no recommendations

## 2021-11-23 NOTE — TELEPHONE ENCOUNTER
----- Message from [employer] sent at 11/23/2021 11:57 AM EST -----  Subject: Message to Provider    QUESTIONS  Information for Provider? Patient was exposed to Covid a week ago. Was   recommended to take a medication call \"[place]\" by a Doctor that   specializes in Kidney Transplants. Please call her for more information on   what to do for her Covid exposure. Has already had one Covid shot but has   not gotten the booster. ---------------------------------------------------------------------------  --------------  Nettie MCKINNEY  What is the best way for the office to contact you? OK to leave message on   voicemail, OK to respond with electronic message via [employer] portal (only   for patients who have registered [employer] account)  Preferred Call Back Phone Number?  [phone]  ---------------------------------------------------------------------------  --------------  SCRIPT ANSWERS  undefined

## 2021-12-01 RX ORDER — CLOPIDOGREL BISULFATE 75 MG/1
TABLET ORAL
Qty: 90 TABLET | Refills: 3 | Status: SHIPPED | OUTPATIENT
Start: 2021-12-01 | End: 2022-09-28 | Stop reason: SDUPTHER

## 2022-03-18 PROBLEM — E11.42 DIABETIC PERIPHERAL NEUROPATHY ASSOCIATED WITH TYPE 2 DIABETES MELLITUS (HCC): Status: ACTIVE | Noted: 2020-11-08

## 2022-03-18 PROBLEM — R91.1 PULMONARY NODULE, RIGHT: Status: ACTIVE | Noted: 2017-10-20

## 2022-03-18 PROBLEM — I63.9 STROKE (HCC): Status: ACTIVE | Noted: 2020-11-07

## 2022-03-18 PROBLEM — I63.22 BASILAR ARTERY OCCLUSION WITH CEREBRAL INFARCTION (HCC): Status: ACTIVE | Noted: 2020-11-08

## 2022-03-18 PROBLEM — J41.0 SIMPLE CHRONIC BRONCHITIS (HCC): Status: ACTIVE | Noted: 2017-10-20

## 2022-03-19 PROBLEM — R07.89 OTHER CHEST PAIN: Status: ACTIVE | Noted: 2017-10-20

## 2022-03-19 PROBLEM — N30.90 CYSTITIS: Status: ACTIVE | Noted: 2017-10-20

## 2022-03-19 PROBLEM — I65.23 BILATERAL CAROTID ARTERY STENOSIS: Status: ACTIVE | Noted: 2020-11-08

## 2022-03-19 PROBLEM — F33.9 RECURRENT DEPRESSION (HCC): Status: ACTIVE | Noted: 2017-12-31

## 2022-03-19 PROBLEM — G43.909 MIGRAINE: Status: ACTIVE | Noted: 2020-11-07

## 2022-03-20 PROBLEM — E11.42 TYPE 2 DIABETES MELLITUS WITH DIABETIC POLYNEUROPATHY, WITHOUT LONG-TERM CURRENT USE OF INSULIN (HCC): Status: ACTIVE | Noted: 2020-11-17

## 2022-03-20 PROBLEM — I63.9 LEFT PONTINE STROKE (HCC): Status: ACTIVE | Noted: 2020-11-08

## 2022-03-21 ENCOUNTER — OFFICE VISIT (OUTPATIENT)
Dept: ORTHOPEDIC SURGERY | Age: 77
End: 2022-03-21
Payer: MEDICARE

## 2022-03-21 VITALS — BODY MASS INDEX: 31.49 KG/M2 | WEIGHT: 189 LBS | HEIGHT: 65 IN

## 2022-03-21 DIAGNOSIS — M25.511 RIGHT SHOULDER PAIN, UNSPECIFIED CHRONICITY: ICD-10-CM

## 2022-03-21 DIAGNOSIS — M19.011 PRIMARY OSTEOARTHRITIS, RIGHT SHOULDER: ICD-10-CM

## 2022-03-21 DIAGNOSIS — M17.12 PRIMARY OSTEOARTHRITIS OF LEFT KNEE: ICD-10-CM

## 2022-03-21 DIAGNOSIS — M25.562 LEFT KNEE PAIN, UNSPECIFIED CHRONICITY: Primary | ICD-10-CM

## 2022-03-21 PROCEDURE — G9717 DOC PT DX DEP/BP F/U NT REQ: HCPCS | Performed by: ORTHOPAEDIC SURGERY

## 2022-03-21 PROCEDURE — 99214 OFFICE O/P EST MOD 30 MIN: CPT | Performed by: ORTHOPAEDIC SURGERY

## 2022-03-21 PROCEDURE — G8756 NO BP MEASURE DOC: HCPCS | Performed by: ORTHOPAEDIC SURGERY

## 2022-03-21 PROCEDURE — 1090F PRES/ABSN URINE INCON ASSESS: CPT | Performed by: ORTHOPAEDIC SURGERY

## 2022-03-21 PROCEDURE — G8427 DOCREV CUR MEDS BY ELIG CLIN: HCPCS | Performed by: ORTHOPAEDIC SURGERY

## 2022-03-21 PROCEDURE — G8417 CALC BMI ABV UP PARAM F/U: HCPCS | Performed by: ORTHOPAEDIC SURGERY

## 2022-03-21 PROCEDURE — G8536 NO DOC ELDER MAL SCRN: HCPCS | Performed by: ORTHOPAEDIC SURGERY

## 2022-03-21 PROCEDURE — G8400 PT W/DXA NO RESULTS DOC: HCPCS | Performed by: ORTHOPAEDIC SURGERY

## 2022-03-21 PROCEDURE — 1101F PT FALLS ASSESS-DOCD LE1/YR: CPT | Performed by: ORTHOPAEDIC SURGERY

## 2022-03-21 RX ORDER — METHYLPREDNISOLONE 4 MG/1
TABLET ORAL
Qty: 1 DOSE PACK | Refills: 0 | Status: SHIPPED | OUTPATIENT
Start: 2022-03-21 | End: 2022-06-13

## 2022-03-21 RX ORDER — MELOXICAM 15 MG/1
15 TABLET ORAL DAILY
Qty: 30 TABLET | Refills: 3 | Status: SHIPPED | OUTPATIENT
Start: 2022-03-21 | End: 2022-06-13

## 2022-03-21 RX ORDER — ZINC GLUCONATE 10 MG
LOZENGE ORAL
COMMUNITY

## 2022-03-21 NOTE — PROGRESS NOTES
Jocelyn Claire (: 1945) is a 68 y.o. female, established patient, here for evaluation of the following chief complaint(s):  Shoulder Pain (right) and Knee Pain (left)       ASSESSMENT/PLAN:  Below is the assessment and plan developed based on review of pertinent history, physical exam, labs, studies, and medications. I recommended starting with a Medrol Dosepak followed by meloxicam as needed. We discussed activity modifications and home exercises. We did discuss possibility of corticosteroid injections if needed in the future. 1. Left knee pain, unspecified chronicity  -     XR KNEE LT MIN 4 V; Future  -     methylPREDNISolone (Medrol, Luis,) 4 mg tablet; Use as directed, Normal, Disp-1 Dose Pack, R-0  -     meloxicam (MOBIC) 15 mg tablet; Take 1 Tablet by mouth daily. , Normal, Disp-30 Tablet, R-3  2. Right shoulder pain, unspecified chronicity  -     XR SHOULDER RT AP/LAT MIN 2 V; Future  -     methylPREDNISolone (Medrol, Luis,) 4 mg tablet; Use as directed, Normal, Disp-1 Dose Pack, R-0  -     meloxicam (MOBIC) 15 mg tablet; Take 1 Tablet by mouth daily. , Normal, Disp-30 Tablet, R-3  3. Primary osteoarthritis, right shoulder  4. Primary osteoarthritis of left knee      Return in about 6 weeks (around 2022), or if symptoms worsen or fail to improve. SUBJECTIVE/OBJECTIVE:  Jocelyn Claire (: 1945) is a 68 y.o. female. She notes aching pain in the right shoulder and left knee ongoing for at least a few months. She denies any specific injury but has tried Tylenol with minimal relief. She notes that increased activity causes increased pain. She notes occasional clicking and popping symptoms.         Allergies   Allergen Reactions    Iodine Shortness of Breath    Ciprofloxacin Unknown (comments)    Hydrocodone Unknown (comments)    Lithium Unknown (comments)    Pravastatin Myalgia    Simvastatin Unknown (comments)    Wellbutrin [Bupropion Hcl] Unknown (comments)       Current Outpatient Medications   Medication Sig    magnesium 250 mg tab Take  by mouth.  methylPREDNISolone (Medrol, Luis,) 4 mg tablet Use as directed    meloxicam (MOBIC) 15 mg tablet Take 1 Tablet by mouth daily.  gemfibroziL (LOPID) 600 mg tablet TAKE 1 TABLET BY MOUTH  TWICE DAILY    montelukast (SINGULAIR) 10 mg tablet TAKE 1 TABLET BY MOUTH  DAILY    atenoloL (TENORMIN) 50 mg tablet TAKE 1 AND 1/2 TABLETS BY  MOUTH DAILY    clopidogreL (PLAVIX) 75 mg tab TAKE 1 TABLET BY MOUTH  DAILY    DULoxetine (Cymbalta) 60 mg capsule Take 60 mg by mouth daily.  cholecalciferol (Vitamin D3) (1000 Units /25 mcg) tablet Take 1,000 Units by mouth daily.  omega-3 fatty acids-vitamin e (Fish Oil) 1,000 mg cap Take 1 Cap by mouth two (2) times a day.  lysine (L-LYSINE) 500 mg Tab tablet Take 500 mg by mouth as needed.  fexofenadine (ALLEGRA) 180 mg tablet Take 180 mg by mouth daily.  DULoxetine (CYMBALTA) 30 mg capsule TAKE 1 CAPSULE BY MOUTH  DAILY (Patient not taking: Reported on 3/21/2022)    vitamin e (E GEMS) 1,000 unit capsule Take 1,000 Units by mouth daily. (Patient not taking: Reported on 3/21/2022)     No current facility-administered medications for this visit.        Social History     Socioeconomic History    Marital status:      Spouse name: Not on file    Number of children: Not on file    Years of education: Not on file    Highest education level: Not on file   Occupational History    Not on file   Tobacco Use    Smoking status: Never Smoker    Smokeless tobacco: Never Used   Vaping Use    Vaping Use: Never used   Substance and Sexual Activity    Alcohol use: No     Comment: occ. wine    Drug use: No    Sexual activity: Yes     Partners: Male   Other Topics Concern    Not on file   Social History Narrative    Not on file     Social Determinants of Health     Financial Resource Strain:     Difficulty of Paying Living Expenses: Not on file   Food Insecurity:     Worried About Running Out of Food in the Last Year: Not on file    Ran Out of Food in the Last Year: Not on file   Transportation Needs:     Lack of Transportation (Medical): Not on file    Lack of Transportation (Non-Medical): Not on file   Physical Activity:     Days of Exercise per Week: Not on file    Minutes of Exercise per Session: Not on file   Stress:     Feeling of Stress : Not on file   Social Connections:     Frequency of Communication with Friends and Family: Not on file    Frequency of Social Gatherings with Friends and Family: Not on file    Attends [employer] Services: Not on file    Active Member of Clubs or Organizations: Not on file    Attends Club or Organization Meetings: Not on file    Marital Status: Not on file   Intimate Partner Violence:     Fear of Current or Ex-Partner: Not on file    Emotionally Abused: Not on file    Physically Abused: Not on file    Sexually Abused: Not on file   Housing Stability:     Unable to Pay for Housing in the Last Year: Not on file    Number of [place] in the Last Year: Not on file    Unstable Housing in the Last Year: Not on file       Past Surgical History:   Procedure Laterality Date    ENDOSCOPY, COLON, DIAGNOSTIC      HX CARPAL TUNNEL RELEASE      right    HX CHOLECYSTECTOMY      HX CYSTOCELE REPAIR      HX HYSTERECTOMY      HX ORTHOPAEDIC      left thumb surgery    HX UROLOGICAL      resection of bladder tumor; bladder sling    ND REPAIR OF RECTOCELE         Family History   Problem Relation Age of Onset    Diabetes Mother     Heart Disease Mother     Elevated Lipids Mother     Cancer Mother         basal cell ca    Hypertension Father     Cancer Father         basal cell ca    Cancer Sister         breast    Breast Cancer Sister         46s        OB History    No obstetric history on file.             REVIEW OF SYSTEMS:  ROS     Negative for: Musculoskeletal    Last edited by Danna Jenkins on 3/21/2022 11:13 AM. (History)        Patient denies any recent fever, chills, nausea, vomiting, chest pain, or shortness of breath. Vitals:  Ht 5' 5\" (1.651 m)   Wt 189 lb (85.7 kg)   BMI 31.45 kg/m²    Body mass index is 31.45 kg/m². PHYSICAL EXAM:  General exam: Patient is awake, alert, and oriented x3. Well-appearing. No acute distress. Ambulates with an antalgic gait    Right shoulder: Neurovascular and sensory intact. There is tenderness to palpation at the anterior lateral shoulder. Slight limitation in passive range of motion on exam.  There is pain with active overhead range of motion. Pain is noted with impingement testing including Ayon exam.  Pain is also noted with rotator cuff strength testing including resisted abduction and resisted external rotation. Normal stability. There is some tenderness palpation at the [facility] joint and pain with crossarm exam.    Left knee: Neurovascular and sensory intact. Effusion is present. Crepitus is noted with range of motion. There is tenderness palpation at the medial and lateral joint line. Uri's maneuver creates mild pain. Normal stability on ligamentous testing including Lachman's exam.  Stable anterior and posterior drawer. No erythema or ecchymosis. IMAGING:    XR Results (most recent):  Results from Appointment encounter on 03/21/22    XR KNEE LT MIN 4 V    Narrative  X-rays of the left knee 4 views done today show evidence of medial joint space narrowing and osteophyte formation. Lateral patellar tilt and early osteophyte formation noted as well. XR SHOULDER RT AP/LAT MIN 2 V    Narrative  X-rays of the right shoulder 3 views done today show evidence of mild glenohumeral joint space narrowing with osteophyte formation at the inferior glenohumeral joint. Type II acromion.       Results from [place] encounter on 06/29/21    XR CHEST PA LAT    Narrative  Exam:  2 view chest    Indication: Follow-up abnormal exam.    COMPARISON: Radiograph of 10/19/2017 and CT scan of 7/2/2020    PA and lateral views demonstrate normal heart size. There is no acute process in  the lung fields. The small pulmonary nodule seen on the CT scan in the right  lower lobe is not visible on this plain radiograph. No adenopathy or pleural effusions. The osseous structures are unremarkable. Impression  1. No acute process is identified. Orders Placed This Encounter    XR SHOULDER RT AP/LAT MIN 2 V     1     Standing Status:   Future     Number of Occurrences:   1     Standing Expiration Date:   9/21/2022    XR KNEE LT MIN 4 V     Standing Status:   Future     Number of Occurrences:   1     Standing Expiration Date:   3/22/2023    methylPREDNISolone (Medrol, Luis,) 4 mg tablet     Sig: Use as directed     Dispense:  1 Dose Pack     Refill:  0    meloxicam (MOBIC) 15 mg tablet     Sig: Take 1 Tablet by mouth daily. Dispense:  30 Tablet     Refill:  3              An electronic signature was used to authenticate this note.   -- Monique Trejo, DO

## 2022-04-29 DIAGNOSIS — I10 ESSENTIAL HYPERTENSION, BENIGN: ICD-10-CM

## 2022-04-29 RX ORDER — ATENOLOL 50 MG/1
TABLET ORAL
Qty: 45 TABLET | Refills: 0 | Status: SHIPPED | OUTPATIENT
Start: 2022-04-29 | End: 2022-09-27 | Stop reason: SDUPTHER

## 2022-04-30 RX ORDER — MONTELUKAST SODIUM 10 MG/1
TABLET ORAL
Qty: 30 TABLET | Refills: 0 | Status: SHIPPED | OUTPATIENT
Start: 2022-04-30 | End: 2022-09-27 | Stop reason: SDUPTHER

## 2022-04-30 RX ORDER — DULOXETIN HYDROCHLORIDE 30 MG/1
CAPSULE, DELAYED RELEASE ORAL
Qty: 30 CAPSULE | Refills: 0 | Status: SHIPPED | OUTPATIENT
Start: 2022-04-30 | End: 2022-06-13 | Stop reason: DRUGHIGH

## 2022-06-02 ENCOUNTER — HOSPITAL ENCOUNTER (OUTPATIENT)
Dept: MRI IMAGING | Age: 77
Discharge: HOME | End: 2022-06-02
Payer: MEDICARE

## 2022-06-02 DIAGNOSIS — I63.22 BASILAR ARTERY OCCLUSION WITH CEREBRAL INFARCTION (HCC): ICD-10-CM

## 2022-06-02 DIAGNOSIS — I63.9 LEFT PONTINE STROKE (HCC): ICD-10-CM

## 2022-06-02 PROCEDURE — 70544 MR ANGIOGRAPHY HEAD W/O DYE: CPT

## 2022-06-07 NOTE — PROGRESS NOTES
Results of the tests were reviewed in MyChart with the patient as follows:Good news your scan results do not show any worsening disease process which is what we want to see in fact it may be supportive of some improvement so that is definitely good news    I would not recommend any changes in treatment at this time based on these results

## 2022-06-13 ENCOUNTER — OFFICE VISIT (OUTPATIENT)
Dept: NEUROLOGY | Age: 77
End: 2022-06-13
Payer: MEDICARE

## 2022-06-13 VITALS
BODY MASS INDEX: 31.72 KG/M2 | SYSTOLIC BLOOD PRESSURE: 140 MMHG | RESPIRATION RATE: 16 BRPM | DIASTOLIC BLOOD PRESSURE: 82 MMHG | OXYGEN SATURATION: 98 % | HEIGHT: 65 IN | WEIGHT: 190.4 LBS | TEMPERATURE: 97.4 F | HEART RATE: 58 BPM

## 2022-06-13 DIAGNOSIS — E78.2 MIXED HYPERLIPIDEMIA: ICD-10-CM

## 2022-06-13 DIAGNOSIS — I63.22 BASILAR ARTERY OCCLUSION WITH CEREBRAL INFARCTION (HCC): ICD-10-CM

## 2022-06-13 DIAGNOSIS — I10 ESSENTIAL HYPERTENSION, BENIGN: ICD-10-CM

## 2022-06-13 DIAGNOSIS — I63.9 LEFT PONTINE STROKE (HCC): Primary | ICD-10-CM

## 2022-06-13 PROCEDURE — G8536 NO DOC ELDER MAL SCRN: HCPCS | Performed by: PSYCHIATRY & NEUROLOGY

## 2022-06-13 PROCEDURE — G8417 CALC BMI ABV UP PARAM F/U: HCPCS | Performed by: PSYCHIATRY & NEUROLOGY

## 2022-06-13 PROCEDURE — G8427 DOCREV CUR MEDS BY ELIG CLIN: HCPCS | Performed by: PSYCHIATRY & NEUROLOGY

## 2022-06-13 PROCEDURE — G9717 DOC PT DX DEP/BP F/U NT REQ: HCPCS | Performed by: PSYCHIATRY & NEUROLOGY

## 2022-06-13 PROCEDURE — 1090F PRES/ABSN URINE INCON ASSESS: CPT | Performed by: PSYCHIATRY & NEUROLOGY

## 2022-06-13 PROCEDURE — G8753 SYS BP > OR = 140: HCPCS | Performed by: PSYCHIATRY & NEUROLOGY

## 2022-06-13 PROCEDURE — 99215 OFFICE O/P EST HI 40 MIN: CPT | Performed by: PSYCHIATRY & NEUROLOGY

## 2022-06-13 PROCEDURE — 1123F ACP DISCUSS/DSCN MKR DOCD: CPT | Performed by: PSYCHIATRY & NEUROLOGY

## 2022-06-13 PROCEDURE — G8754 DIAS BP LESS 90: HCPCS | Performed by: PSYCHIATRY & NEUROLOGY

## 2022-06-13 PROCEDURE — G8400 PT W/DXA NO RESULTS DOC: HCPCS | Performed by: PSYCHIATRY & NEUROLOGY

## 2022-06-13 PROCEDURE — 1101F PT FALLS ASSESS-DOCD LE1/YR: CPT | Performed by: PSYCHIATRY & NEUROLOGY

## 2022-06-13 NOTE — PROGRESS NOTES
Chief Complaint   Patient presents with    Follow-up     yearly follow up on stroke and read mri recently done on the first of the [facility]

## 2022-06-13 NOTE — LETTER
6/13/2022    Patient: Abbey Pedraza   YOB: 1945   Date of Visit: 6/13/2022     Marco Walsh MD  [address]  [address] [address]  Via Fax: [phone]    Dear Marco Walsh MD,      Thank you for referring Ms. Charley Yuan to [address] [place] [address] for evaluation. My notes for this consultation are attached. If you have questions, please do not hesitate to call me. I look forward to following your patient along with you.       Sincerely,    Sadi Quiñones NP

## 2022-06-13 NOTE — ASSESSMENT & PLAN NOTE
MRA completed 6/20/2022 shows improvement but still moderate stenosis    Reviewed results of today's office visit with the patient and family    Recommend repeat MRA in 1 year prior to next follow-up visit

## 2022-06-13 NOTE — PATIENT INSTRUCTIONS
As per discussion you are doing really well and your MRA shows that the stenosis is actually improved but still there but it is improved so continue on the Plavix and the gemfibrozil    Below some information about warning signs for stroke as well as a low-sodium diet to help with your blood pressure    I strongly recommend you get a dedicated primary care doctor to continue to monitor your blood pressure your cholesterol and any other acute problems that may occur    I am going to set you up to have an MRA completed again in a year from now prior to your office visit      Office Policies      o Appointments  Please make sure that you arrive for your next appointment at least 15 minutes prior to your appointment time. If for some reason you are going to be late please notify the office to determine if you need to be rescheduled or we can adjust your appointment time      o Phone calls/patient messages:  Please allow up to 24 hours for someone in the office to contact you about your call or message. Be mindful your provider may be out of the office or your message may require further review. We encourage you to use [employer] for your messages as this is a faster, more efficient way to communicate with our office    o Medication Refills:  Prescription medications require up to 48 business hours to process. We encourage you to use [employer] for your refills. For controlled medications: Please allow up to 72 business hours to process. Certain medications may require you to  a written prescription at our office. NO narcotic/controlled medications will be prescribed after 4pm Monday through Friday or on weekends    o Form/Paperwork Completion:  We ask that you allow 7-14 business days. You may also download your forms to [employer] to have your doctor print off. Learning About BE FAST: Stroke Warning Signs  What are the BE FAST stroke warning signs?   BE FAST is a simple way to remember the main symptoms of stroke. These symptoms happen suddenly. So knowing what to look for helps you know when to call for medical help. BE FAST stands for:  · B alance. Loss of balance or trouble walking. · E yes. Trouble seeing out of one or both eyes. · F ace. Weakness or drooping on one side of the face. · A rm. Weakness or numbness in an arm or leg. · S peech. Trouble speaking. · T samaria to call 911. Other stroke symptoms include sudden confusion, sudden trouble understanding simple statements, fainting, a seizure, and a sudden, severe headache. What are the symptoms of a stroke? Symptoms of a stroke happen quickly. A stroke may cause:  · Sudden numbness, tingling, weakness, or loss of movement in your face, arm, or leg, especially on only one side of your body. · Sudden vision changes. · Sudden trouble speaking. · Sudden confusion or trouble understanding simple statements. · Sudden problems with walking or balance. · A sudden, severe headache that is different from past headaches. · Fainting. · A seizure. It's important to call for medical help if you have stroke symptoms. Quick treatment may save your life. And it may reduce the damage in your brain so that you have fewer problems after the stroke. What happens when you have a stroke? A stroke occurs when a blood vessel to the brain bursts or is blocked by a blood clot. The blood supply to part of the brain is reduced. Without blood and the oxygen it supplies, the nerve cells in that part of the brain die within minutes. As a result, the part of the body controlled by those cells cannot work properly. The effects of a stroke may range from mild to severe. They may get better, or they may last the rest of your life. A stroke can affect many things, including vision, speech, behavior, thought processes, and your ability to move. Where can you learn more?   Go to http://www.gray.com/  Enter B651 in the search box to learn more about \"Learning About BE FAST: Stroke Warning Signs. \"  Current as of: July 6, 2021               Content Version: 13.2  © 2006-2022 [employer]. Care instructions adapted under license by [employer] (which disclaims liability or warranty for this information). If you have questions about a medical condition or this instruction, always ask your healthcare professional. [address] any warranty or liability for your use of this information. DASH Diet: Care Instructions  Your Care Instructions     The DASH diet is an eating plan that can help lower your blood pressure. DASH stands for Dietary Approaches to Stop Hypertension. Hypertension is high blood pressure. The DASH diet focuses on eating foods that are high in calcium, potassium, and magnesium. These nutrients can lower blood pressure. The foods that are highest in these nutrients are fruits, vegetables, low-fat dairy products, nuts, seeds, and legumes. But taking calcium, potassium, and magnesium supplements instead of eating foods that are high in those nutrients does not have the same effect. The DASH diet also includes whole grains, fish, and poultry. The DASH diet is one of several lifestyle changes your doctor may recommend to lower your high blood pressure. Your doctor may also want you to decrease the amount of sodium in your diet. Lowering sodium while following the DASH diet can lower blood pressure even further than just the DASH diet alone. Follow-up care is a key part of your treatment and safety. Be sure to make and go to all appointments, and call your doctor if you are having problems. It's also a good idea to know your test results and keep a list of the medicines you take. How can you care for yourself at home? Following the DASH diet  · Eat 4 to 5 servings of fruit each day.  A serving is 1 medium-sized piece of fruit, ½ cup chopped or canned fruit, 1/4 cup dried fruit, or 4 ounces (½ cup) of fruit juice. Choose fruit more often than fruit juice. · Eat 4 to 5 servings of vegetables each day. A serving is 1 cup of lettuce or raw leafy vegetables, ½ cup of chopped or cooked vegetables, or 4 ounces (½ cup) of vegetable juice. Choose vegetables more often than vegetable juice. · Get 2 to 3 servings of low-fat and fat-free dairy each day. A serving is 8 ounces of milk, 1 cup of yogurt, or 1 ½ ounces of cheese. · Eat 6 to 8 servings of grains each day. A serving is 1 slice of bread, 1 ounce of dry cereal, or ½ cup of cooked rice, pasta, or cooked cereal. Try to choose whole-grain products as much as possible. · Limit lean meat, poultry, and fish to 2 servings each day. A serving is 3 ounces, about the size of a deck of cards. · Eat 4 to 5 servings of nuts, seeds, and legumes (cooked dried beans, lentils, and split peas) each week. A serving is 1/3 cup of nuts, 2 tablespoons of seeds, or ½ cup of cooked beans or peas. · Limit fats and oils to 2 to 3 servings each day. A serving is 1 teaspoon of vegetable oil or 2 tablespoons of salad dressing. · Limit sweets and added sugars to 5 servings or less a week. A serving is 1 tablespoon jelly or jam, ½ cup sorbet, or 1 cup of lemonade. · Eat less than 2,300 milligrams (mg) of sodium a day. If you limit your sodium to 1,500 mg a day, you can lower your blood pressure even more. · Be aware that all of these are the suggested number of servings for people who eat 1,800 to 2,000 calories a day. Your recommended number of servings may be different if you need more or fewer calories. Tips for success  · Start small. Do not try to make dramatic changes to your diet all at once. You might feel that you are missing out on your favorite foods and then be more likely to not follow the plan. Make small changes, and stick with them. Once those changes become habit, add a few more changes. · Try some of the following:  ?  Make it a goal to eat a fruit or vegetable at every meal and at snacks. This will make it easy to get the recommended amount of fruits and vegetables each day. ? Try yogurt topped with fruit and nuts for a snack or healthy dessert. ? Add lettuce, tomato, cucumber, and onion to sandwiches. ? Combine a ready-made pizza crust with low-fat mozzarella cheese and lots of vegetable toppings. Try using tomatoes, squash, spinach, broccoli, carrots, cauliflower, and onions. ? Have a variety of cut-up vegetables with a low-fat dip as an appetizer instead of chips and dip. ? Sprinkle sunflower seeds or chopped almonds over salads. Or try adding chopped walnuts or almonds to cooked vegetables. ? Try some vegetarian meals using beans and peas. Add garbanzo or kidney beans to salads. Make burritos and tacos with mashed antonio beans or black beans. Where can you learn more? Go to http://www.britt.com/  Enter H967 in the search box to learn more about \"DASH Diet: Care Instructions. \"  Current as of: January 10, 2022               Content Version: 13.2  © 6640-0740 [employer]. Care instructions adapted under license by [employer] (which disclaims liability or warranty for this information). If you have questions about a medical condition or this instruction, always ask your healthcare professional. [place] [address] any warranty or liability for your use of this information.

## 2022-06-13 NOTE — ASSESSMENT & PLAN NOTE
Patient is presently on atenolol her blood pressure readings elevated at today's office visit    I have strongly encouraged the patient to follow-up with her PCP regarding further evaluation and management if necessary.   [Presently patient is using GYN as her PCP so she should contact her GYN presently and I have strongly encouraged the patient to find a dedicated PCP given her medical history]    Reviewed low-salt diet and gave him information on DASH diet on the AVS

## 2022-06-13 NOTE — ASSESSMENT & PLAN NOTE
Stable without recurrence of symptoms  Patient is to continue on Plavix and gemfibrozil    Patient is to continue to work to all of his comorbid conditions as managed by PCP and other specialists as appropriate    RECOMMENDATIONS:  - BP goal is less than 140/90  - Goal HbA1c is less than 7.   - LDL less than 70     Of note patient's PCP has left the area.   She states GYN is managing her primary healthcare concerns    I have recommended that she follow-up with a primary care doctor specifically to help manage blood pressure and keep a closer eye on cholesterol and any acute problems that may occur    Reviewed signs and symptoms of stroke and have given them written information on their AVS and advised them if they feel as though they are having any type of signs or symptoms of stroke to go to the nearest ED for evaluation

## 2022-06-13 NOTE — PROGRESS NOTES
[address]  In Office FOLLOW-UP VISIT         Hawa Moore is a 68 y.o. female who presents today for the following:  Chief Complaint   Patient presents with    Follow-up     yearly follow up on stroke and read mri recently done on the first of the [address]  1. Left pontine stroke [facility])  Assessment & Plan:  Stable without recurrence of symptoms  Patient is to continue on Plavix and gemfibrozil    Patient is to continue to work to all of his comorbid conditions as managed by PCP and other specialists as appropriate    RECOMMENDATIONS:  - BP goal is less than 140/90  - Goal HbA1c is less than 7.   - LDL less than 70     Of note patient's PCP has left the area. She states GYN is managing her primary healthcare concerns    I have recommended that she follow-up with a primary care doctor specifically to help manage blood pressure and keep a closer eye on cholesterol and any acute problems that may occur    Reviewed signs and symptoms of stroke and have given them written information on their AVS and advised them if they feel as though they are having any type of signs or symptoms of stroke to go to the nearest ED for evaluation  Orders:  -     MRA BRAIN WO CONT; Future  2. Basilar artery occlusion with cerebral infarction [facility])  Assessment & Plan:  MRA completed 6/20/2022 shows improvement but still moderate stenosis    Reviewed results of today's office visit with the patient and family    Recommend repeat MRA in 1 year prior to next follow-up visit  Orders:  -     MRA BRAIN WO CONT; Future  3. Essential hypertension, benign  Assessment & Plan:   Patient is presently on atenolol her blood pressure readings elevated at today's office visit    I have strongly encouraged the patient to follow-up with her PCP regarding further evaluation and management if necessary.   [Presently patient is using GYN as her PCP so she should contact her GYN presently and I have strongly encouraged the patient to find a dedicated PCP given her medical history]    Reviewed low-salt diet and gave him information on DASH diet on the AVS  4. Mixed hyperlipidemia  Assessment & Plan:   Patient presently on gemfibrozil to follow-up with PCP as appropriate      Patient and/or family was given time to ask questions and voice concerns. I believe all questions concerns were adequately addressed at this  office visit. Patient and/or family also verbalized agreement and understanding of the above-stated plan    Patient remains a complex patient secondary to polypharmacy, significant comorbid conditions, and use of high-risk medications which complicate the decision making process related to patient's neurologic diagnosis and so you can see        ICD-10-CM ICD-9-CM    1. Left pontine stroke (HCC)  I63.9 434.91 MRA BRAIN WO CONT   2. 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 Essential hypertension, benign  I10 401.1    4. Mixed hyperlipidemia  E78.2 272.2          I attest that 40 minutes was spent on today's visit reviewing medical records and diagnostic testing deemed pertinent to this patient's care, along with direct time spent at patient's visit including the history, physical assessment and plan, discussing diagnosis and management along with documentation.       HPI  Historical Data  Patient is known to the practice and was previously seen by Dr Sandra Huber     Neurologic diagnosis  Stroke: November 7, 2020             Pontine              RT sided weakness  Severe vertebrobasilar stenotic disease             Dual platelet therapy: Plavix and aspirin and high-dose statin     Other significant comorbid conditions/concerns  Bipolar  Hypertension  Diabetes  Cholesterol  Asthma  History of bladder cancer  COPD  Sleep apnea on CPAP     Interim Data:   Patient is here today with her spouse and her daughter Prerna Quigley is on the phone  History obtained mostly from the patient    Status post pontine CVA     Patient denies any new or worsening signs or symptoms consistent with cerebrovascular insufficiency since last evaluated             Patient states she has no residual symptoms and is back to normal  Pt continues on his prescribed medication of Plavix and gemfibrozil and denies side effects             Aspirin was discontinued after 30 days from stroke  Patient continues to work to keep risk factor comorbid conditions under good control    Reviewed results of MRI scan from 6/2/2022 [see below for details] at office visit 6/13/2022 I also sent patient a note through [address] regarding the results which she has not yet reviewed      Results from [place] encounter on 06/02/22    MRA BRAIN WO CONT    Impression  Slightly diminished now moderate stenosis of the mid basilar artery. Otherwise no significant interval change. Results from [place] encounter on 05/21/21    MRA BRAIN WO CONT    Impression  No significant interval change. Stable high-grade stenosis/occlusion of the mid basilar artery. Results from [place] encounter on 11/07/20    MRA NECK WO CONT    Impression  IMPRESSION:  1. Small focus of acute infarction in the left kelly. 2.  No significant carotid stenosis. 3.  Focal high-grade stenosis/occlusion of the mid basilar artery. 4.  Well-developed right posterior communicating artery. Allergies   Allergen Reactions    Iodine Shortness of Breath    Ciprofloxacin Unknown (comments)    Hydrocodone Unknown (comments)    Lithium Unknown (comments)    Pravastatin Myalgia    Simvastatin Unknown (comments)    Wellbutrin [Bupropion Hcl] Unknown (comments)       Current Outpatient Medications   Medication Sig    montelukast (SINGULAIR) 10 mg tablet TAKE 1 TABLET BY MOUTH  DAILY    atenoloL (TENORMIN) 50 mg tablet TAKE 1 AND 1/2 TABLETS BY  MOUTH DAILY    magnesium 250 mg tab Take  by mouth.     gemfibroziL (LOPID) 600 mg tablet TAKE 1 TABLET BY MOUTH  TWICE DAILY    clopidogreL (PLAVIX) 75 mg tab TAKE 1 TABLET BY MOUTH  DAILY    DULoxetine (Cymbalta) 60 mg capsule Take 60 mg by mouth daily.  cholecalciferol (Vitamin D3) (1000 Units /25 mcg) tablet Take 1,000 Units by mouth daily.  omega-3 fatty acids-vitamin e (Fish Oil) 1,000 mg cap Take 1 Cap by mouth two (2) times a day.  lysine (L-LYSINE) 500 mg Tab tablet Take 500 mg by mouth as needed.  fexofenadine (ALLEGRA) 180 mg tablet Take 180 mg by mouth daily. No current facility-administered medications for this visit. Past medical history/surgical history, family history, and social history have been reviewed for today's visit      ROS    A ten system review of constitutional, cardiovascular, respiratory, musculoskeletal, endocrine, skin, SHEENT, genitourinary, psychiatric and neurologic systems was obtained and is unremarkable except as mentioned under HPI          EXAMINATION:     Visit Vitals  BP (!) 140/82 (BP 1 Location: Left upper arm, BP Patient Position: Sitting, BP Cuff Size: Adult)   Pulse (!) 58   Temp 97.4 °F (36.3 °C) (Temporal)   Resp 16   Ht 5' 5\" (1.651 m)   Wt 190 lb 6.4 oz (86.4 kg)   SpO2 98%   BMI 31.68 kg/m²       General appearance: Patient is well-developed and well-nourished in no apparent distress and well groomed.     Psych/mental health:  Affect: Appropriate    PHQ  3 most recent PHQ Screens 6/13/2022   Little interest or pleasure in doing things Not at all   Feeling down, depressed, irritable, or hopeless Not at all   Total Score PHQ 2 0       HEENT:   Normocephalic  With evidence of trauma: No  Full range of motion head neck: Yes  Tenderness to palpation of the head neck region: No      Cardiovascular:     Extremities warm to touch: Yes  Extremity swelling: No  Discoloration: No  Evidence of PVD: No    Respiratory:   Dyspnea on exertion: No   Abnormal effort on casual observation: No   Use of portable oxygen: No   Evidence of cyanosis: No     Musculoskeletal:   Evidence of significant bone deformities: No   Spinal curvature: No     Integumentary:    Obvious bruising: No   Lacerations or discoloration on casual observation: No       Neurological Examination:   Mental Status:        MMSE  No flowsheet data found. Formal testing was not completed    there was nothing concerning on general observation and discussion. Alert oriented and appropriate to general conversation  Normal processing on general observation  Followed conversation and responded seemingly appropriate throughout the office visit  No word finding difficulties noted on casual observation  Able to follow directions without difficulty     Cranial Nerves:    Grossly intact    Motor:   Normal bulk  No tremor appreciated on today's exam  No abnormal movements appreciated on today's exam  Moves extremities spontaneously and with purpose        Sensation: Intact to light touch    Coordination/Cerebellar:   Grossly intact    Gait: Ambulates independently    Reflexes: Not tested    Fall risk assessment  Fall Risk Assessment, last 12 mths 6/13/2022   Able to walk? Yes   Fall in past 12 months? 0   Do you feel unsteady? 0   Are you worried about falling 0   Is TUG test greater than 12 seconds? -   Is the gait abnormal? -   Number of falls in past 12 months -   Fall with injury? -           Follow-up and Dispositions    · Return in about 1 year (around 6/13/2023) for In office appointment.            Starla Krishnan MS, ANP-BC, [facility]

## 2022-08-10 ENCOUNTER — TELEPHONE (OUTPATIENT)
Dept: FAMILY MEDICINE CLINIC | Age: 77
End: 2022-08-10

## 2022-08-10 NOTE — TELEPHONE ENCOUNTER
Called pt to schedule her for a medication refill, per Dr. Eric Fajardo and she stated she would call the office back to schedule.

## 2022-09-21 NOTE — PROGRESS NOTES
ICD-10-CM ICD-9-CM    1. Essential hypertension, benign  I10 401.1 CBC WITH AUTOMATED DIFF      METABOLIC PANEL, COMPREHENSIVE      LIPID PANEL      MICROALBUMIN, UR, RAND W/ MICROALB/CREAT RATIO      2. Medicare annual wellness visit, subsequent  Z00.00 V70.0       3. Mixed hyperlipidemia  E78.2 272.2       4. Left pontine stroke (HCC)  I63.9 434.91       5. Prediabetes  R73.03 790.29 HEMOGLOBIN A1C WITH EAG      REFERRAL TO PODIATRY      REFERRAL TO OPHTHALMOLOGY      6. Simple chronic bronchitis (HCC)  J41.0 491.0       7. Recurrent depression (HCC)  F33.9 296.30       8. Malignant neoplasm of urinary bladder, unspecified site (HCC)  C67.9 188.9       9. Sprain of other ligament of left ankle, initial encounter  S93.492A 845.09                Subjective:     Chief Complaint   Patient presents with    [address] [place] [address] St is a 68 y.o. F.  She has a past medical history of prior bladder cancer, hypercholesterolemia, and hypertension, among other medical problems. I reviewed and updated the medical record that the patient was seen by her previous primary care provider most recently in February 2021. She is noted to have a prior history of acute ischemic CVA in the left kelly. At the time of her follow-up, she was noted to be using Plavix and still following up with neurology. She was noted to have a history of type 2 diabetes, with the last A1c being 6% or so. She was noted to not be using a statin secondary to statin intolerance. Physical examination at the time had been notable for obesity with a BMI of 30. She was referred for routine laboratory testing. She was noted to be on gemfibrozil and was continued with this. Over the past several months, the patient has been lost to follow-up. She was seen most recently for an office visit in June 2022 in neurology.   She was noted to have had no recurrence of her symptoms of left pontine stroke and was continued on Plavix and gemfibrozil. Today, the patient comes in for Medicare wellness visit, as well as follow-up on her chronic medical concerns and establishment. She has a chief complaint today of left-sided ankle pain, which she says has started bothering her 2 days prior to this visit. She says that she does not remember hurting herself, but says that her ankle has felt somewhat unsteady and somewhat painful occasionally when walking. She denies having had any inability to bear weight on the ankle, and in fact has been walking otherwise normally, but says that occasionally she will notice some discomfort along the lateral aspect of the malleolus, with certain movements. She denies having had any other significant problems including any weakness of the joint, or loss of sensation. No other focal neurological symptoms. Other than this, she reports a history of CVA, which continues be followed by neurology. She denies having had any new focal neurological symptoms. She has a history of statin intolerance, having been previously on atorvastatin, pravastatin, and simvastatin, having had significant muscle aches with all of these. She subsequently was placed on gemfibrozil, which she continues to tolerate well. She denies having had any GI symptoms or muscle aches with this. She does not remember having been on ezetimibe or Repatha in the past.    She has a past history of diabetes/prediabetes, which was not treated with medications previously. She has not had a routine eye examination and foot examination in some time. She denies having had any polyuria or polydipsia or any other constitutional or systemic complaints. She reports a history of asthma, for which she apparently had been on Symbicort in the past, but she says this was eventually discontinued by her previous pulmonary specialist.  Her pulmonary specialist has apparently since retired.   She continues on montelukast, and reports that with this medication she has not had to resort to her rescue inhaler at all over the past year. No change in her exercise tolerance or any wheezing or recent coughing. She has a history of bladder cancer, for which she continues to be followed by a urologist although she cannot recall when the date of her last follow-up appointment was. She says that this continues be followed up regularly though, with serial cystoscopy. She denies having had any hematuria or lower abdominal pain. No fevers or chills, or any weight changes or changes in appetite. She does not measure her blood pressure readings at home but thinks that they run about where they are in clinic today; she denies having had any lightheadedness or dizziness, and notably, only uses atenolol 50 mg daily for this. Finally, she reports a history of depression/anxiety/bipolar disorder. Apparently, this has been under the care of primary care for some time, as her previous psychiatrist had retired. She has been on duloxetine 60 mg daily for some time, and was tolerating this well, without any recent anxiety or depression or any suicidal homicidal ideation. She denies having had any side effects with the use of this medication. Other than this, her review of systems is negative, and she remains fully independent with regard to activities and instrumental activities of daily living. Routine Healthcare Maintenance issues are reviewed and discussed with the patient as noted below. Orders to update gaps in healthcare maintenance were placed as noted below in the Assessment and Plan, where applicable. Medicare Wellness Questions: Patient lives at home and is completely independent with regard to activities and instrumental activities of daily living. Patient does not drink alcohol to excess. Patient denies recent problems with memory, vision, hearing, balance or gait. Ambulates without difficulty. No abuse concerns.   Patient denies recent depression or anxiety concerns. Patient is not using safety equipment in the home. Past Medical History:  Past Medical History:   Diagnosis Date    Allergic rhinitis 01/17/2011    Asthma     Basilar artery stenosis     Depression with anxiety 01/17/2011    RX = Duloxetine    Diverticulosis 07/07/2010    Essential hypertension, benign 11/30/2012    History of bladder cancer 07/07/2010    History of echocardiogram 11/2020    LV: Estimated LVEF is 55 - 60%. Visually measured ejection fraction. Normal cavity size and systolic function (ejection fraction normal). Mild concentric hypertrophy. TV: Mild tricuspid valve regurgitation is present. PASP 30 mmHg. History of nuclear stress test 01/2021    No ischemia or infarction; LVEF 83% on Lexiscan    History of prediabetes     History of shingles     History of stroke 11/2020    Left Pontine CVA - Follows with Neurology (last 6/22)    Mixed hyperlipidemia 01/17/2011    Obesity     Sleep apnea, obstructive     CPAP    Statin intolerance     Prior trials: pravastatin, simvastatin, atorvastatin       Past Surgical Histor:  Past Surgical History:   Procedure Laterality Date    ENDOSCOPY, COLON, DIAGNOSTIC      HX CARPAL TUNNEL RELEASE      right    HX CHOLECYSTECTOMY      HX CYSTOCELE REPAIR      HX HYSTERECTOMY      HX ORTHOPAEDIC      left thumb surgery    HX UROLOGICAL      resection of bladder tumor; bladder sling    [place] REPAIR OF RECTOCELE         Allergies: Allergies   Allergen Reactions    Iodine Shortness of Breath    Ciprofloxacin Unknown (comments)    Hydrocodone Unknown (comments)    Lithium Unknown (comments)    Pravastatin Myalgia    Simvastatin Unknown (comments)    Wellbutrin [Bupropion Hcl] Unknown (comments)       Medications:  Current Outpatient Medications   Medication Sig Dispense Refill    clopidogreL (PLAVIX) 75 mg tab Take 1 Tablet by mouth daily. 90 Tablet 3    multivitamin (ONE A DAY) tablet Take 1 Tablet by mouth daily.       ascorbic acid, vitamin C, (Vitamin C) 250 mg tablet Take  by mouth. zinc sulfate (ZINC-15 PO) Take  by mouth. DULoxetine (Cymbalta) 60 mg capsule Take 1 Capsule by mouth daily. 30 Capsule 0    montelukast (SINGULAIR) 10 mg tablet Take 1 Tablet by mouth daily. 30 Tablet 0    gemfibroziL (LOPID) 600 mg tablet Take 1 Tablet by mouth two (2) times a day. 180 Tablet 0    atenoloL (TENORMIN) 50 mg tablet TAKE 1 AND 1/2 TABLETS BY  MOUTH DAILY 45 Tablet 0    magnesium 250 mg tab Take  by mouth. cholecalciferol (VITAMIN D3) (1000 Units /25 mcg) tablet Take 1,000 Units by mouth daily. omega-3 fatty acids-vitamin e (Fish Oil) 1,000 mg cap Take 1 Cap by mouth two (2) times a day. lysine (L-LYSINE) 500 mg Tab tablet Take 500 mg by mouth as needed. fexofenadine (ALLEGRA) 180 mg tablet Take 180 mg by mouth daily.          Social History:  Social History     Socioeconomic History    Marital status:    Tobacco Use    Smoking status: Never    Smokeless tobacco: Never   Vaping Use    Vaping Use: Never used   Substance and Sexual Activity    Alcohol use: No     Comment: occ. wine    Drug use: No    Sexual activity: Yes     Partners: Male       Family History:  Family History   Problem Relation Age of Onset    Diabetes Mother     Heart Disease Mother     Elevated Lipids Mother     Cancer Mother         basal cell ca    Hypertension Father     Cancer Father         basal cell ca    Cancer Sister         breast    Breast Cancer Sister         46s       Immunizations:  Immunization History   Administered Date(s) Administered    COVID-19, J&J, (age 18y+), IM, 0.5mL 04/12/2021    COVID-19, PFIZER PURPLE top, DILUTE for use, (age 15 y+), IM, 30mcg/0.3mL 12/07/2021    Influenza High Dose Vaccine PF 12/23/2015, 09/13/2016, 10/29/2020    Influenza Vaccine 11/30/2012    Influenza Vaccine (Tri) Adjuvanted (>65 Yrs FLUAD TRI 22158) 10/30/2018    Influenza Vaccine PF 11/15/2013    Pneumococcal Conjugate (PCV-13) 04/16/2019    Pneumococcal Polysaccharide (PPSV-23) 08/02/2017    Tdap 12/19/2013        Healthcare Maintenance:  Health Maintenance   Topic Date Due    Foot Exam Q1  Never done    Eye Exam Retinal or Dilated  Never done    Shingrix Vaccine Age 50> (1 of 2) Never done    MICROALBUMIN Q1  02/23/2022    COVID-19 Vaccine (3 - Booster for Miracle series) 04/07/2022    Flu Vaccine (1) 08/01/2022    Depression Monitoring  06/13/2023    Medicare Yearly Exam  09/29/2023    DTaP/Tdap/Td series (2 - Td or Tdap) 12/19/2023    Hepatitis C Screening  Completed    Bone Densitometry (Dexa) Screening  Completed    Pneumococcal 65+ years  Completed        Review of Systems:  ROS:  Review of Systems   Constitutional: Negative. HENT: Negative. Eyes: Negative. Respiratory: Negative. Cardiovascular: Negative. Gastrointestinal: Negative. Genitourinary: Negative. Musculoskeletal:  Positive for joint pain. Skin: Negative. Neurological: Negative. Endo/Heme/Allergies: Negative. Psychiatric/Behavioral: Negative. ROS otherwise negative      Objective:     Vital Signs:  Visit Vitals  /82 (BP 1 Location: Right arm, BP Patient Position: Sitting, BP Cuff Size: Adult)   Pulse 74   Resp 16   Ht 5' 5\" (1.651 m)   Wt 189 lb 9.6 oz (86 kg)   SpO2 97%   BMI 31.55 kg/m²       BMI:  Body mass index is 31.55 kg/m². Physical Examination:  Physical Exam  Constitutional:       Appearance: Normal appearance. She is obese. HENT:      Head: Normocephalic and atraumatic. Right Ear: External ear normal.      Left Ear: External ear normal.      Nose: Nose normal.      Mouth/Throat:      Mouth: Mucous membranes are moist.      Pharynx: Oropharynx is clear. No oropharyngeal exudate or posterior oropharyngeal erythema. Cardiovascular:      Rate and Rhythm: Normal rate and regular rhythm. Pulses: Normal pulses. Heart sounds: Normal heart sounds. No murmur heard. No friction rub. No gallop.    Pulmonary:      Effort: Pulmonary effort is normal. No respiratory distress. Breath sounds: Normal breath sounds. No wheezing, rhonchi or rales. Abdominal:      General: Abdomen is flat. Bowel sounds are normal. There is no distension. Palpations: Abdomen is soft. Tenderness: There is no abdominal tenderness. There is no guarding. Musculoskeletal:         General: No swelling, tenderness or deformity. Normal range of motion. Cervical back: Normal range of motion and neck supple. No rigidity or tenderness. Comments: Mild TTP L posterior malleolus without any bone tenderness at the tip of the lateral malleolus. Skin:     General: Skin is warm and dry. Findings: No erythema, lesion or rash. Neurological:      General: No focal deficit present. Mental Status: She is alert and oriented to person, place, and time. Mental status is at baseline. Sensory: No sensory deficit. Motor: No weakness. Gait: Gait normal.      Deep Tendon Reflexes: Reflexes normal.   Psychiatric:         Mood and Affect: Mood normal.         Behavior: Behavior normal.         Judgment: Judgment normal.        Physical exam otherwise negative    Diagnostic Testing:    Laboratory Studies:  No visits with results within 6 Month(s) from this visit.    Latest known visit with results is:   Office Visit on 02/23/2021   Component Date Value Ref Range Status    WBC 02/23/2021 4.3  4.1 - 10.9 K/uL Final    RBC 02/23/2021 4.70  4.20 - 6.30 M/uL Final    HGB 02/23/2021 13.8  12.0 - 18.0 g/dL Final    HCT 02/23/2021 41.4  37.0 - 51.0 % Final    MCH 02/23/2021 29.4  26.0 - 32.0 pg Final    MCHC 02/23/2021 33.3  30.0 - 36.0 g/dL Final    MCV 02/23/2021 88.0  80.0 - 97.0 fL Final    RDW 02/23/2021 13.6  % Final    PLATELET 84/50/1707 764.9 (A)  140.0 - 440.0 K/uL Final    Cholesterol, total 02/23/2021 210 (A)  0 - 200 mg/dL Final    Triglyceride 02/23/2021 157  0 - 200 mg/dL Final    HDL Cholesterol 02/23/2021 36  35 - 130 mg/dL Final    VLDL 02/23/2021 31  mg/dL Final    LDL, calculated 02/23/2021 143 (A)  0 - 130 mg/dL Final    CHOL/HDL Ratio 02/23/2021 6 (A)  0 - 4 Ratio Final    LDL/HDL Ratio 02/23/2021 4  Ratio Final    Glucose 02/23/2021 85  65 - 105 mg/dL Final    BUN 02/23/2021 21.0 (A)  7.0 - 17.0 mg/dL Final    Creatinine 02/23/2021 0.7  0.7 - 1.2 mg/dL Final    Sodium 02/23/2021 143  137 - 145 mmol/L Final    Potassium 02/23/2021 4.5  3.6 - 5.0 mmol/L Final    Chloride 02/23/2021 102  98 - 107 mmol/L Final    CO2 02/23/2021 30.0  22.0 - 32.0 mmol/L Final    Calcium 02/23/2021 10.0  8.4 - 10.2 mg/dl Final    Protein, total 02/23/2021 7.0  6.3 - 8.2 g/dL Final    Albumin 02/23/2021 4.5  3.9 - 5.4 g/dL Final    ALT (SGPT) 02/23/2021 15  0 - 35 U/L Final    AST (SGOT) 02/23/2021 24.0  14.0 - 36.0 U/L Final    Alk. phosphatase 02/23/2021 77  38 - 126 U/L Final    Bilirubin, total 02/23/2021 0.8  0.2 - 1.3 mg/dL Final    BUN/Creatinine ratio 02/23/2021 30  Ratio Final    GFR est AA 02/23/2021 >60  mL/min/1.73m2 Final    Comment: Using the modified MDRD study equations, GFR calculations are not valid   in patients less than 25years of age. GFR est non-AA 02/23/2021 >60  mL/min/1.73m2 Final    Comment: Using the modified MDRD study equations, GFR calculations are not valid   in patients less than 25years of age.       Globulin 02/23/2021 2.50   Final    A-G Ratio 02/23/2021 1.8  Ratio Final    Anion gap 02/23/2021 11  mmol/L Final    Hemoglobin A1c 02/23/2021 5.4  4.8 - 5.6 % Final    Comment:          Prediabetes: 5.7 - 6.4           Diabetes: >6.4           Glycemic control for adults with diabetes: <7.0      Estimated average glucose 02/23/2021 108  mg/dL Final    Color 02/23/2021 Pale Yellow  Pale Yellow - Yellow Final    CLARITY 02/23/2021 clear  Clear Final    Glucose urine, 24 hr 02/23/2021 Negative  Negative Final    Ketone 02/23/2021 Negative  Negative Final    Bilirubin 02/23/2021 Negative  Negative Final    Specific gravity 02/23/2021 1.015  1.000 - 1.030 Final    pH (UA) 02/23/2021 6  5 - 7 Final    Blood 02/23/2021 Negative  Negative Final    Protein 02/23/2021 Negative  Negative Final    Urobilinogen 02/23/2021 Negative  Negative Final    Nitrites 02/23/2021 Negative  Negative Final    Leukocyte Esterase 02/23/2021 Negative  Negative Final    WBC 02/23/2021 0  0 #/HPF Final    CULTURE SENT    RBC 02/23/2021 0-2 (A)  0 #/HPF Final    Bacteria 02/23/2021 Few (A)  None Seen Final    Microalbumin, Urine 02/23/2021 20  0 - 20 mg/L Final    Urine Culture, Routine 02/23/2021    Final                    Value:Culture shows less than 10,000 colony forming units of bacteria per  milliliter of urine. This colony count is not generally considered  to be clinically significant. Radiographic Studies:  XR Results (most recent):  Results from Appointment encounter on 03/21/22    XR KNEE LT MIN 4 V    Narrative  X-rays of the left knee 4 views done today show evidence of medial joint space narrowing and osteophyte formation. Lateral patellar tilt and early osteophyte formation noted as well. [facility] Results (most recent):  Results from [place] encounter on 11/25/20    [facility] MAMMO BI SCREENING INCL CAD    Narrative  STUDY: Bilateral digital screening mammogram    INDICATION:  Screening. COMPARISON: 2018, 2019, 2017    BREAST COMPOSITION:  The breasts are heterogeneously dense, which may obscure  small masses. FINDINGS: Bilateral digital screening mammography was performed and is  interpreted in conjunction with a computer assisted detection (CAD) system. No  suspicious masses or calcifications are identified. Bilateral dystrophic breast  calcifications are noted. There has been no significant change. Impression  IMPRESSION:  BI-RADS 2: Benign. No mammographic evidence of malignancy. RECOMMENDATIONS:  Next screening mammogram is recommended in one year. The patient will be notified of these results.      CT Results (most recent):  Results from [place] encounter on 11/07/20    CT HEAD WO CONT    Narrative  EXAMINATION:  CT HEAD WO CONT    CLINICAL INFORMATION:  stroke like symptoms  COMPARISON:  None. TECHNIQUE: Routine axial head CT was performed. IV contrast was not  administered. Sagittal and coronal reconstructions were generated. CT dose reduction was achieved through use of a standardized protocol tailored  for this examination and automatic exposure control for dose modulation. FINDINGS:  No acute infarct, hemorrhage or mass. VENTRICULAR SYSTEM:  Normal for age. BASAL CISTERNS:  Patent. BRAIN PARENCHYMA:  No significant abnormalities. MIDLINE SHIFT:  None. CALVARIUM/ SKULL BASE: Intact. PARANASAL SINUSES AND MASTOID AIR CELLS: Small right maxillary sinus retention  cyst. Left maxillary sinus clear. Partial bilateral ethmoid opacification. Frontal sinuses clear. Fluid or retention cyst in the left sphenoid air cell. Mastoid sinuses clear. VISUALIZED ORBITS: No significant abnormalities. SELLA: No enlargement. Impression  IMPRESSION:  1. No acute intracranial abnormalities. 2. Paranasal sinus inflammatory changes as noted above. DEXA Results (most recent):  Results from Hospital Encounter encounter on 08/15/17    DEXA BONE DENSITY STUDY AXIAL    Narrative  Bone Mineral Density    Indication:  Screening  Age: 67  Sex: Female. Menopause status: Postmenopausal.  Hormone replacement therapy: No    Number of falls in the past year:   1  Risk factors for osteoporosis:  None. Current medication for osteoporosis: None. Comparison: None. Technique: Imaging was performed on the [employer] QDR 4500 C. World Health  Organization meta-analysis fracture risk calculator (FRAX) analysis was  performed for 10 year fracture risk probability assessment    Excluded sites: L4 due to spondylosis    Findings:      Femoral Neck:  Left  Bone mineral density (gm/cm2):?  0.845  % of peak bone mass: 99  % for age matched controls:? 133  T-score: -0.0  Z-score: 1.9    Total Hip: Left  Bone mineral density (gm/cm2):  0.972  % of peak bone mass:   103  % for age matched controls:  131  T-score:   0.2  Z-score:  1.9    Lumbar Spine:  L1-L3  Bone mineral density (gm/cm2):  1.093  % of peak bone mass:  107  % for age matched controls:  141  T-score:  0.7  Z-score:  2.9    The T score for the left distal third radius is -0.5. Impression  Impression: This patient is normal using the World Health Organization criteria  10 year probability of major osteoporotic fracture:  7.9%  10 year probability of hip fracture:  0.7%    Recommendations:  Therapy recommendations need to be tailored to each individual patient. Using  the [facility]) FRAX absolute fracture algorithm, the  [address] [place] [address] recommends beginning pharmacological therapy in  postmenopausal women and men over the age of 48 with a 8 year probability of a  hip fracture of >3% OR with the 10 year probability of a major osteoporotic  fracture of >20%. Please reconsider testing based on risk factors. Currently, Medicare will only  reimburse for a central DXA examination every two years, unless the patient is  on chronic glucocorticoid therapy. Note: Please note that reliable, valid comparisons cannot be made between  studies which have been performed on machines from different manufacturers. If  clinically warranted, a follow up study performed at this site, on the same  unit, would allow the most sensitive assessment of change in bone mineral  density. MRI Results (most recent):  Results from [place] encounter on 06/02/22    MRA BRAIN WO CONT    Narrative  Clinical history: Pontine stroke on the left  INDICATION:   Pontine stroke on the left  COMPARISON: 5/21/2021  TECHNIQUE:  3-D time-of-flight MRA of the brain was performed. Multiplanar  reconstructions were obtained.     FINDINGS:    Slightly diminished now moderate stenosis of the mid basilar artery. The left  vertebral artery is dominant. A 2 segments and A3 segments are within normal limits. There are A1 segments  bilaterally. Posterior communicating artery on the right. . Petrous and cavernous  ICAs are patent. . M1 segments are patent. Symmetric arborization of M2 and M3  vessels. . There is no aneurysm. .    Impression  Slightly diminished now moderate stenosis of the mid basilar artery. Otherwise no significant interval change. Assessment/Plan:       ICD-10-CM ICD-9-CM    1. Essential hypertension, benign  I10 401.1 CBC WITH AUTOMATED DIFF      METABOLIC PANEL, COMPREHENSIVE      LIPID PANEL      MICROALBUMIN, UR, RAND W/ MICROALB/CREAT RATIO      2. Medicare annual wellness visit, subsequent  Z00.00 V70.0       3. Mixed hyperlipidemia  E78.2 272.2       4. Left pontine stroke (HCC)  I63.9 434.91       5. Prediabetes  R73.03 790.29 HEMOGLOBIN A1C WITH EAG      REFERRAL TO PODIATRY      REFERRAL TO OPHTHALMOLOGY      6. Simple chronic bronchitis ([facility])  J41.0 491.0       7. Recurrent depression ([facility])  F33.9 296.30       8. Malignant neoplasm of urinary bladder, unspecified site (HCC)  C67.9 188.9       9. Sprain of other ligament of left ankle, initial encounter  S93.492A 845.09                  Follow-up and Dispositions    Return in about 6 months (around 3/28/2023), or if symptoms worsen or fail to improve.      Healthcare Maintenance:  - Preventive measures are reviewed as per above  - Up to date on routine interventions except as noted above  - Orders placed to update gaps as noted  - Notes: Fasting labs ordered, advised flu vaccine, updated COVID 19 booster    Asthma:   - Current Symptoms: taking medications as instructed, no medication side effects noted, no significant ongoing wheezing or shortness of breath, using bronchodilator MDI less than twice a week   - CAT/ACT: ---   - MMRC: ---   - Current Assessment: mild intermittent asthma   - Current Regimen:   Key COPD Medications               montelukast (SINGULAIR) 10 mg tablet (Taking) Take 1 Tablet by mouth daily. montelukast (SINGULAIR) 10 mg tablet (Discontinued) TAKE 1 TABLET BY MOUTH  DAILY           - Plan: current treatment plan is effective, no change in therapy        ASCVD:   - Type: history of CVA   - Current Symptoms: No Symptoms, no angina   - History and Contributing Factors: elevated cholesterol, hypertension, and known CVA, basilar artery stenosis  Key CAD CHF Meds               clopidogreL (PLAVIX) 75 mg tab (Taking) Take 1 Tablet by mouth daily. gemfibroziL (LOPID) 600 mg tablet (Taking) Take 1 Tablet by mouth two (2) times a day. atenoloL (TENORMIN) 50 mg tablet (Taking) TAKE 1 AND 1/2 TABLETS BY  MOUTH DAILY    omega-3 fatty acids-vitamin e (Fish Oil) 1,000 mg cap (Taking) Take 1 Cap by mouth two (2) times a day. atenoloL (TENORMIN) 50 mg tablet (Discontinued) TAKE 1 AND 1/2 TABLETS BY  MOUTH DAILY    gemfibroziL (LOPID) 600 mg tablet (Discontinued) TAKE 1 TABLET BY MOUTH  TWICE DAILY    clopidogreL (PLAVIX) 75 mg tab (Discontinued) TAKE 1 TABLET BY MOUTH  DAILY            - Target BP: < 130/80 mmHg; see Blood Pressure section   - Statin? NO   - Antiplatelet and/or Anticoagulant? YES   - ACEI/ARB? NO   - Beta Blocker? YES   - Notes: Rechecking LDL; consider ezetimibe or Repatha, see below    Essential Hypertension/Blood Pressure Management:   - Home BP Readings: not doing   - Current Control: optimal   - Target BP: <130/80 mmhg   - Relevant BP Meds:  Key CAD CHF Meds               clopidogreL (PLAVIX) 75 mg tab (Taking) Take 1 Tablet by mouth daily. gemfibroziL (LOPID) 600 mg tablet (Taking) Take 1 Tablet by mouth two (2) times a day. atenoloL (TENORMIN) 50 mg tablet (Taking) TAKE 1 AND 1/2 TABLETS BY  MOUTH DAILY    omega-3 fatty acids-vitamin e (Fish Oil) 1,000 mg cap (Taking) Take 1 Cap by mouth two (2) times a day.     atenoloL (TENORMIN) 50 mg tablet (Discontinued) TAKE 1 AND 1/2 TABLETS BY  MOUTH DAILY    gemfibroziL (LOPID) 600 mg tablet (Discontinued) TAKE 1 TABLET BY MOUTH  TWICE DAILY    clopidogreL (PLAVIX) 75 mg tab (Discontinued) TAKE 1 TABLET BY MOUTH  DAILY               - Plan: continue current treatment regimen, continue current meds, dietary sodium restriction, weight loss   - Notes: CCM, labs pending    Diabetes Mellitus:   - Type: prediabetes   - Current A1C:   Lab Results   Component Value Date/Time    Hemoglobin A1c 5.4 02/23/2021 03:26 PM    Hemoglobin A1c (POC) 6.3 04/16/2019 10:23 AM       - Target F1I: <9%   - Complications: peripheral neuropathy   - Relevant Diabetes Medications:  Key Antihyperglycemic Medications       Patient is on no antihyperglycemic meds. Checking A1c; referral to podiatry, ophthalmology for routine screening        Hyperlipidemia/Dyslipidemia:   - Summary of Cardiovascular Risks and Goals:     LDL goal is under 80  hypertension  hyperlipidemia  obese  prior CVA/TIA or known carotid artery disease   -   Lab Results   Component Value Date/Time    LDL, calculated 143 (H) 02/23/2021 03:28 PM    HDL Cholesterol 36 02/23/2021 03:28 PM       - Relevant Cholesterol Meds:  Key Antihyperlipidemia Meds               gemfibroziL (LOPID) 600 mg tablet (Taking) Take 1 Tablet by mouth two (2) times a day. omega-3 fatty acids-vitamin e (Fish Oil) 1,000 mg cap (Taking) Take 1 Cap by mouth two (2) times a day.     gemfibroziL (LOPID) 600 mg tablet (Discontinued) TAKE 1 TABLET BY MOUTH  TWICE DAILY              - Cholesterol at target: no   - Does patient meet USPSTF and ACC/AHA indications for pharmacotherapy (e.g., statin): yes   - GI symptoms with meds: NO   - Muscle aches with meds: NO   - Other Adverse effects with meds: NO   - Medication Plan: continue   - Notes: Continue gemfibrozile, consider ezetimibe if not at target; consider Repatha given h/o statin intolerance to multiple statins; awaiting labs    Ankle Sprain:   - No plain films indicated based upon Cobb ankle rule; advised conservative mgmt, return precautions provided; PVU    I have reviewed the patient's medical history in detail and updated the computerized patient record. We had a prolonged discussion about these complex clinical issues and went over the various important aspects to consider. All questions were answered. Advised the patient to call back or return to office if symptoms do not improve, change in nature, or persist.     The patient was given an after visit summary or informed of [facility] which includes patient instructions, diagnoses, current medications, & vitals. she expressed understanding with the diagnosis and plan. Trinity Kate MD    Please note that this dictation was completed with [employer], the computer voice recognition software. Quite often unanticipated grammatical, syntax, homophones, and other interpretive errors are inadvertently transcribed by the computer software. Please disregard these errors. Please excuse any errors that have escaped final proofreading. This is the Subsequent Medicare Annual Wellness Exam, performed 12 months or more after the Initial AWV or the last Subsequent AWV    I have reviewed the patient's medical history in detail and updated the computerized patient record. Assessment/Plan   Education and counseling provided:  Are appropriate based on today's review and evaluation    1. Essential hypertension, benign  -     CBC WITH AUTOMATED DIFF; Future  -     METABOLIC PANEL, COMPREHENSIVE; Future  -     LIPID PANEL; Future  -     MICROALBUMIN, UR, RAND W/ MICROALB/CREAT RATIO; Future  2. Medicare annual wellness visit, subsequent  3. Mixed hyperlipidemia  4. Left pontine stroke ([facility] [address]  5. Prediabetes  -     HEMOGLOBIN A1C WITH EAG; Future  -     REFERRAL TO PODIATRY  -     REFERRAL TO OPHTHALMOLOGY  6. Simple chronic bronchitis ([address]  7. Recurrent depression ([address]  8.  Malignant neoplasm of urinary bladder, unspecified site ([facility] [address]  9. Sprain of other ligament of left ankle, initial encounter       Depression Risk Factor Screening     3 most recent PHQ Screens 6/13/2022   Little interest or pleasure in doing things Not at all   Feeling down, depressed, irritable, or hopeless Not at all   Total Score PHQ 2 0       Alcohol & Drug Abuse Risk Screen    Do you average more than 1 drink per night or more than 7 drinks a week:  No    On any one occasion in the past three months have you have had more than 3 drinks containing alcohol:  No          Functional Ability and Level of Safety    Hearing: Hearing is good. Activities of Daily Living: The home contains: no safety equipment. Patient does total self care      Ambulation: with no difficulty     Fall Risk:  Fall Risk Assessment, last 12 mths 6/13/2022   Able to walk? Yes   Fall in past 12 months? 0   Do you feel unsteady? 0   Are you worried about falling 0   Is TUG test greater than 12 seconds? -   Is the gait abnormal? -   Number of falls in past 12 months -   Fall with injury? -      Abuse Screen:  Patient is not abused       Cognitive Screening    Has your family/caregiver stated any concerns about your memory: no     Cognitive Screening: Normal - Verbal Fluency Test    Health Maintenance Due     Health Maintenance Due   Topic Date Due    Foot Exam Q1  Never done    Eye Exam Retinal or Dilated  Never done    Shingrix Vaccine Age 50> (1 of 2) Never done    MICROALBUMIN Q1  02/23/2022    COVID-19 Vaccine (3 - Booster for Miracle series) 04/07/2022    Flu Vaccine (1) 08/01/2022       Patient Care Team   Patient Care Team:  Aldean Fabry, MD as PCP - General (Internal Medicine Physician)  Carlos Males (Urology)    History     Patient Active Problem List   Diagnosis Code    Bladder cancer [facility]) C67.9    Diverticulosis K57.90    Chronic airway obstruction, not elsewhere classified J44.9    Allergic rhinitis J30.9    Depression F32. A    Mixed hyperlipidemia E78.2 Encounter for long-term (current) use of other medications Z79.899    Essential hypertension, benign I10    Esophageal reflux K21.9    Colon polyps K63.5    Other chest pain R07.89    Simple chronic bronchitis (HCC) J41.0    Pulmonary nodule, right R91.1    Cystitis N30.90    Recurrent depression (HCC) F33.9    Migraine G43.909    Stroke (HCC) I63.9    Left pontine stroke (HCC) I63.9    Bilateral carotid artery stenosis I65.23    Basilar artery occlusion with cerebral infarction ([facility] [address] I63.22     Past Medical History:   Diagnosis Date    Allergic rhinitis 01/17/2011    Asthma     Basilar artery stenosis     Depression with anxiety 01/17/2011    RX = Duloxetine    Diverticulosis 07/07/2010    Essential hypertension, benign 11/30/2012    History of bladder cancer 07/07/2010    History of echocardiogram 11/2020    LV: Estimated LVEF is 55 - 60%. Visually measured ejection fraction. Normal cavity size and systolic function (ejection fraction normal). Mild concentric hypertrophy. TV: Mild tricuspid valve regurgitation is present. PASP 30 mmHg. History of nuclear stress test 01/2021    No ischemia or infarction; LVEF 83% on Lexiscan    History of prediabetes     History of shingles     History of stroke 11/2020    Left Pontine CVA - Follows with Neurology (last 6/22)    Mixed hyperlipidemia 01/17/2011    Obesity     Sleep apnea, obstructive     CPAP    Statin intolerance     Prior trials: pravastatin, simvastatin, atorvastatin      Past Surgical History:   Procedure Laterality Date    ENDOSCOPY, COLON, DIAGNOSTIC      HX CARPAL TUNNEL RELEASE      right    HX CHOLECYSTECTOMY      HX CYSTOCELE REPAIR      HX HYSTERECTOMY      HX ORTHOPAEDIC      left thumb surgery    HX UROLOGICAL      resection of bladder tumor; bladder sling    HI REPAIR OF RECTOCELE       Current Outpatient Medications   Medication Sig Dispense Refill    clopidogreL (PLAVIX) 75 mg tab Take 1 Tablet by mouth daily.  90 Tablet 3    multivitamin (ONE A DAY) tablet Take 1 Tablet by mouth daily. ascorbic acid, vitamin C, (Vitamin C) 250 mg tablet Take  by mouth. zinc sulfate (ZINC-15 PO) Take  by mouth. DULoxetine (Cymbalta) 60 mg capsule Take 1 Capsule by mouth daily. 30 Capsule 0    montelukast (SINGULAIR) 10 mg tablet Take 1 Tablet by mouth daily. 30 Tablet 0    gemfibroziL (LOPID) 600 mg tablet Take 1 Tablet by mouth two (2) times a day. 180 Tablet 0    atenoloL (TENORMIN) 50 mg tablet TAKE 1 AND 1/2 TABLETS BY  MOUTH DAILY 45 Tablet 0    magnesium 250 mg tab Take  by mouth. cholecalciferol (VITAMIN D3) (1000 Units /25 mcg) tablet Take 1,000 Units by mouth daily. omega-3 fatty acids-vitamin e (Fish Oil) 1,000 mg cap Take 1 Cap by mouth two (2) times a day. lysine (L-LYSINE) 500 mg Tab tablet Take 500 mg by mouth as needed. fexofenadine (ALLEGRA) 180 mg tablet Take 180 mg by mouth daily.        Allergies   Allergen Reactions    Iodine Shortness of Breath    Ciprofloxacin Unknown (comments)    Hydrocodone Unknown (comments)    Lithium Unknown (comments)    Pravastatin Myalgia    Simvastatin Unknown (comments)    Wellbutrin [Bupropion Hcl] Unknown (comments)       Family History   Problem Relation Age of Onset    Diabetes Mother     Heart Disease Mother     Elevated Lipids Mother     Cancer Mother         basal cell ca    Hypertension Father     Cancer Father         basal cell ca    Cancer Sister         breast    Breast Cancer Sister         46s     Social History     Tobacco Use    Smoking status: Never    Smokeless tobacco: Never   Substance Use Topics    Alcohol use: No     Comment: [facility]. wine         Dawn Lorenzo MD

## 2022-09-27 DIAGNOSIS — I10 ESSENTIAL HYPERTENSION, BENIGN: ICD-10-CM

## 2022-09-27 RX ORDER — MONTELUKAST SODIUM 10 MG/1
10 TABLET ORAL DAILY
Qty: 30 TABLET | Refills: 0 | Status: SHIPPED | OUTPATIENT
Start: 2022-09-27 | End: 2022-10-17

## 2022-09-27 RX ORDER — ATENOLOL 50 MG/1
TABLET ORAL
Qty: 45 TABLET | Refills: 0 | Status: SHIPPED | OUTPATIENT
Start: 2022-09-27

## 2022-09-27 RX ORDER — GEMFIBROZIL 600 MG/1
600 TABLET, FILM COATED ORAL 2 TIMES DAILY
Qty: 180 TABLET | Refills: 0 | Status: SHIPPED | OUTPATIENT
Start: 2022-09-27

## 2022-09-27 RX ORDER — DULOXETIN HYDROCHLORIDE 60 MG/1
60 CAPSULE, DELAYED RELEASE ORAL DAILY
Qty: 30 CAPSULE | Refills: 0 | Status: SHIPPED | OUTPATIENT
Start: 2022-09-27 | End: 2022-10-17

## 2022-09-27 NOTE — TELEPHONE ENCOUNTER
PCP: Beto Odom MD    Last appt: 2/23/2021  Future Appointments   Date Time Provider Bora Marin   9/28/2022  2:30 PM Marco Antonio Miles MD [facility] BS AMB   [facility] 11[facility]00 [facility]   6/14/2023  9:30 AM CINDY Tam BS AMB       Requested Prescriptions     Pending Prescriptions Disp Refills    DULoxetine (Cymbalta) 60 mg capsule       Sig: Take 1 Capsule by mouth daily. montelukast (SINGULAIR) 10 mg tablet 30 Tablet 0     Sig: Take 1 Tablet by mouth daily. gemfibroziL (LOPID) 600 mg tablet 180 Tablet 0     Sig: Take 1 Tablet by mouth two (2) times a day. atenoloL (TENORMIN) 50 mg tablet 45 Tablet 0       Prior labs and Blood pressures:  BP Readings from Last 3 Encounters:   06/13/22 (!) 140/82   06/10/21 120/78   02/23/21 102/74     Lab Results   Component Value Date/Time    Sodium 143 02/23/2021 03:28 PM    Potassium 4.5 02/23/2021 03:28 PM    Chloride 102 02/23/2021 03:28 PM    CO2 30.0 02/23/2021 03:28 PM    Anion gap 11 02/23/2021 03:28 PM    Glucose 85 02/23/2021 03:28 PM    BUN 21.0 (H) 02/23/2021 03:28 PM    Creatinine 0.7 02/23/2021 03:28 PM    BUN/Creatinine ratio 30 02/23/2021 03:28 PM    GFR est AA >60 02/23/2021 03:28 PM    GFR est non-AA >60 02/23/2021 03:28 PM    Calcium 10.0 02/23/2021 03:28 PM     Lab Results   Component Value Date/Time    Hemoglobin A1c 5.4 02/23/2021 03:26 PM    Hemoglobin A1c (POC) 6.3 04/16/2019 10:23 AM     Lab Results   Component Value Date/Time    Cholesterol, total 210 (H) 02/23/2021 03:28 PM    HDL Cholesterol 36 02/23/2021 03:28 PM    LDL,Direct 104 (H) 11/08/2020 03:26 AM    LDL, calculated 143 (H) 02/23/2021 03:28 PM    VLDL, calculated  11/08/2020 03:26 AM     Calculation not valid with this patient's other Lipid values.     VLDL 31 02/23/2021 03:28 PM    Triglyceride 157 02/23/2021 03:28 PM    CHOL/HDL Ratio 6 (H) 02/23/2021 03:28 PM     Lab Results   Component Value Date/Time    Vitamin D 25-Hydroxy 32 01/18/2011 08:42 AM VITAMIN D, 25-HYDROXY 37.7 12/23/2015 11:20 AM       Lab Results   Component Value Date/Time    TSH 0.85 11/08/2020 03:26 AM

## 2022-09-28 ENCOUNTER — OFFICE VISIT (OUTPATIENT)
Dept: INTERNAL MEDICINE CLINIC | Age: 77
End: 2022-09-28
Payer: MEDICARE

## 2022-09-28 VITALS
HEART RATE: 74 BPM | OXYGEN SATURATION: 97 % | BODY MASS INDEX: 31.59 KG/M2 | WEIGHT: 189.6 LBS | DIASTOLIC BLOOD PRESSURE: 82 MMHG | RESPIRATION RATE: 16 BRPM | HEIGHT: 65 IN | SYSTOLIC BLOOD PRESSURE: 120 MMHG

## 2022-09-28 DIAGNOSIS — I10 ESSENTIAL HYPERTENSION, BENIGN: Primary | ICD-10-CM

## 2022-09-28 DIAGNOSIS — Z00.00 MEDICARE ANNUAL WELLNESS VISIT, SUBSEQUENT: ICD-10-CM

## 2022-09-28 DIAGNOSIS — E78.2 MIXED HYPERLIPIDEMIA: ICD-10-CM

## 2022-09-28 DIAGNOSIS — C67.9 MALIGNANT NEOPLASM OF URINARY BLADDER, UNSPECIFIED SITE (HCC): ICD-10-CM

## 2022-09-28 DIAGNOSIS — S93.492A SPRAIN OF OTHER LIGAMENT OF LEFT ANKLE, INITIAL ENCOUNTER: ICD-10-CM

## 2022-09-28 DIAGNOSIS — R73.03 PREDIABETES: ICD-10-CM

## 2022-09-28 DIAGNOSIS — F33.9 RECURRENT DEPRESSION (HCC): ICD-10-CM

## 2022-09-28 DIAGNOSIS — I63.9 LEFT PONTINE STROKE (HCC): ICD-10-CM

## 2022-09-28 DIAGNOSIS — J41.0 SIMPLE CHRONIC BRONCHITIS (HCC): ICD-10-CM

## 2022-09-28 PROBLEM — E11.42 TYPE 2 DIABETES MELLITUS WITH DIABETIC POLYNEUROPATHY, WITHOUT LONG-TERM CURRENT USE OF INSULIN (HCC): Status: RESOLVED | Noted: 2020-11-17 | Resolved: 2022-09-28

## 2022-09-28 PROBLEM — E11.42 DIABETIC PERIPHERAL NEUROPATHY ASSOCIATED WITH TYPE 2 DIABETES MELLITUS (HCC): Status: RESOLVED | Noted: 2020-11-08 | Resolved: 2022-09-28

## 2022-09-28 PROCEDURE — G0439 PPPS, SUBSEQ VISIT: HCPCS | Performed by: INTERNAL MEDICINE

## 2022-09-28 PROCEDURE — 1123F ACP DISCUSS/DSCN MKR DOCD: CPT | Performed by: INTERNAL MEDICINE

## 2022-09-28 PROCEDURE — 99214 OFFICE O/P EST MOD 30 MIN: CPT | Performed by: INTERNAL MEDICINE

## 2022-09-28 RX ORDER — BISMUTH SUBSALICYLATE 262 MG
1 TABLET,CHEWABLE ORAL DAILY
COMMUNITY

## 2022-09-28 RX ORDER — ASCORBIC ACID 250 MG
TABLET ORAL
COMMUNITY

## 2022-09-28 RX ORDER — CLOPIDOGREL BISULFATE 75 MG/1
75 TABLET ORAL DAILY
Qty: 90 TABLET | Refills: 3 | Status: SHIPPED | OUTPATIENT
Start: 2022-09-28

## 2022-09-28 NOTE — PATIENT INSTRUCTIONS
Medicare Wellness Visit, Female     The best way to live healthy is to have a lifestyle where you eat a well-balanced diet, exercise regularly, limit alcohol use, and quit all forms of tobacco/nicotine, if applicable. Regular preventive services are another way to keep healthy. Preventive services (vaccines, screening tests, monitoring & exams) can help personalize your care plan, which helps you manage your own care. Screening tests can find health problems at the earliest stages, when they are easiest to treat. [facility] follows the current, evidence-based guidelines published by the [facility] Chakraborty ([facility]) when recommending preventive services for our patients. Because we follow these guidelines, sometimes recommendations change over time as research supports it. (For example, mammograms used to be recommended annually. Even though Medicare will still pay for an annual mammogram, the newer guidelines recommend a mammogram every two years for women of average risk). Of course, you and your doctor may decide to screen more often for some diseases, based on your risk and your co-morbidities (chronic disease you are already diagnosed with). Preventive services for you include:  - Medicare offers their members a free annual wellness visit, which is time for you and your primary care provider to discuss and plan for your preventive service needs. Take advantage of this benefit every year!  -All adults over the age of 72 should receive the recommended pneumonia vaccines. Current USPSTF guidelines recommend a series of two vaccines for the best pneumonia protection.   -All adults should have a flu vaccine yearly and a tetanus vaccine every 10 years.   -All adults age 48 and older should receive the shingles vaccines (series of two vaccines).       -All adults age 38-68 who are overweight should have a diabetes screening test once every three years.   -All adults born between [birth year] and 1965 should be screened once for Hepatitis C.  -Other screening tests and preventive services for persons with diabetes include: an eye exam to screen for diabetic retinopathy, a kidney function test, a foot exam, and stricter control over your cholesterol.   -Cardiovascular screening for adults with routine risk involves an electrocardiogram (ECG) at intervals determined by your doctor.   -Colorectal cancer screenings should be done for adults age 54-65 with no increased risk factors for colorectal cancer. There are a number of acceptable methods of screening for this type of cancer. Each test has its own benefits and drawbacks. Discuss with your doctor what is most appropriate for you during your annual wellness visit. The different tests include: colonoscopy (considered the best screening method), a fecal occult blood test, a fecal DNA test, and sigmoidoscopy.    -A bone mass density test is recommended when a woman turns 65 to screen for osteoporosis. This test is only recommended one time, as a screening. Some providers will use this same test as a disease monitoring tool if you already have osteoporosis. -Breast cancer screenings are recommended every other year for women of normal risk, age 54-69.  -Cervical cancer screenings for women over age 72 are only recommended with certain risk factors.      Here is a list of your current Health Maintenance items (your personalized list of preventive services) with a due date:  Health Maintenance Due   Topic Date Due    Diabetic Foot Care  Never done    Eye Exam  Never done    Shingles Vaccine (1 of 2) Never done    Albumin Urine Test  02/23/2022    COVID-19 Vaccine (3 - Booster for Miracle series) 04/07/2022    Yearly Flu Vaccine (1) 08/01/2022

## 2022-09-28 NOTE — PROGRESS NOTES
Chief Complaint   Patient presents with    Establish Care     Visit Vitals  /82 (BP 1 Location: Right arm, BP Patient Position: Sitting, BP Cuff Size: Adult)   Pulse 74   Resp 16   Ht 5' 5\" (1.651 m)   Wt 189 lb 9.6 oz (86 kg)   SpO2 97%   BMI 31.55 kg/m²       1. \"Have you been to the ER, urgent care clinic since your last visit? Hospitalized since your last visit? \" No    2. \"Have you seen or consulted any other health care providers outside of the [address] [place] [address] since your last visit? \" No     3. For patients aged 39-70: Has the patient had a colonoscopy / FIT/ Cologuard? No      If the patient is female:    4. For patients aged 41-77: Has the patient had a mammogram within the past 2 years? No      5. For patients aged 21-65: Has the patient had a pap smear?  No

## 2022-10-03 ENCOUNTER — LAB ONLY (OUTPATIENT)
Dept: INTERNAL MEDICINE CLINIC | Age: 77
End: 2022-10-03

## 2022-10-03 DIAGNOSIS — R73.03 PREDIABETES: ICD-10-CM

## 2022-10-03 DIAGNOSIS — I10 ESSENTIAL HYPERTENSION, BENIGN: ICD-10-CM

## 2022-10-04 LAB
ALBUMIN SERPL-MCNC: 4.2 G/DL (ref 3.5–5)
ALBUMIN/GLOB SERPL: 1.4 {RATIO} (ref 1.1–2.2)
ALP SERPL-CCNC: 70 U/L (ref 45–117)
ALT SERPL-CCNC: 21 U/L (ref 12–78)
ANION GAP SERPL CALC-SCNC: 4 MMOL/L (ref 5–15)
AST SERPL-CCNC: 27 U/L (ref 15–37)
BASOPHILS # BLD: 0 K/UL (ref 0–0.1)
BASOPHILS NFR BLD: 0 % (ref 0–1)
BILIRUB SERPL-MCNC: 0.6 MG/DL (ref 0.2–1)
BUN SERPL-MCNC: 14 MG/DL (ref 6–20)
BUN/CREAT SERPL: 22 (ref 12–20)
CALCIUM SERPL-MCNC: 9.2 MG/DL (ref 8.5–10.1)
CHLORIDE SERPL-SCNC: 105 MMOL/L (ref 97–108)
CHOLEST SERPL-MCNC: 202 MG/DL
CO2 SERPL-SCNC: 27 MMOL/L (ref 21–32)
CREAT SERPL-MCNC: 0.64 MG/DL (ref 0.55–1.02)
CREAT UR-MCNC: 171 MG/DL
DIFFERENTIAL METHOD BLD: ABNORMAL
EOSINOPHIL # BLD: 0 K/UL (ref 0–0.4)
EOSINOPHIL NFR BLD: 0 % (ref 0–7)
ERYTHROCYTE [DISTWIDTH] IN BLOOD BY AUTOMATED COUNT: 13.7 % (ref 11.5–14.5)
EST. AVERAGE GLUCOSE BLD GHB EST-MCNC: 134 MG/DL
GLOBULIN SER CALC-MCNC: 3 G/DL (ref 2–4)
GLUCOSE SERPL-MCNC: 160 MG/DL (ref 65–100)
HBA1C MFR BLD: 6.3 % (ref 4–5.6)
HCT VFR BLD AUTO: 44.2 % (ref 35–47)
HDLC SERPL-MCNC: 32 MG/DL
HDLC SERPL: 6.3 {RATIO} (ref 0–5)
HGB BLD-MCNC: 14.2 G/DL (ref 11.5–16)
IMM GRANULOCYTES # BLD AUTO: 0 K/UL
IMM GRANULOCYTES NFR BLD AUTO: 0 %
LDLC SERPL CALC-MCNC: 132 MG/DL (ref 0–100)
LYMPHOCYTES # BLD: 2.1 K/UL (ref 0.8–3.5)
LYMPHOCYTES NFR BLD: 54 % (ref 12–49)
MCH RBC QN AUTO: 29.8 PG (ref 26–34)
MCHC RBC AUTO-ENTMCNC: 32.1 G/DL (ref 30–36.5)
MCV RBC AUTO: 92.9 FL (ref 80–99)
MICROALBUMIN UR-MCNC: 3.69 MG/DL
MICROALBUMIN/CREAT UR-RTO: 22 MG/G (ref 0–30)
MONOCYTES # BLD: 0.4 K/UL (ref 0–1)
MONOCYTES NFR BLD: 11 % (ref 5–13)
NEUTS SEG # BLD: 1.3 K/UL (ref 1.8–8)
NEUTS SEG NFR BLD: 35 % (ref 32–75)
NRBC # BLD: 0 K/UL (ref 0–0.01)
NRBC BLD-RTO: 0 PER 100 WBC
PLATELET # BLD AUTO: 123 K/UL (ref 150–400)
POTASSIUM SERPL-SCNC: 5.1 MMOL/L (ref 3.5–5.1)
PROT SERPL-MCNC: 7.2 G/DL (ref 6.4–8.2)
RBC # BLD AUTO: 4.76 M/UL (ref 3.8–5.2)
RBC MORPH BLD: ABNORMAL
SODIUM SERPL-SCNC: 136 MMOL/L (ref 136–145)
TRIGL SERPL-MCNC: 190 MG/DL (ref ?–150)
VLDLC SERPL CALC-MCNC: 38 MG/DL
WBC # BLD AUTO: 3.8 K/UL (ref 3.6–11)

## 2022-10-04 NOTE — PROGRESS NOTES
Please call the patient regarding her diagnostic evaluation. Prediabetes with A1c of 6.3%. Stable but improving hypercholesterolemia. Platelet count stable and slightly improved compared to before, up to 123 compared to 118 1 year ago. Continue with current management.

## 2022-10-17 RX ORDER — DULOXETIN HYDROCHLORIDE 60 MG/1
60 CAPSULE, DELAYED RELEASE ORAL DAILY
Qty: 30 CAPSULE | Refills: 11 | Status: SHIPPED | OUTPATIENT
Start: 2022-10-17

## 2022-10-17 RX ORDER — MONTELUKAST SODIUM 10 MG/1
10 TABLET ORAL DAILY
Qty: 30 TABLET | Refills: 11 | Status: SHIPPED | OUTPATIENT
Start: 2022-10-17

## 2022-11-28 RX ORDER — GEMFIBROZIL 600 MG/1
TABLET, FILM COATED ORAL
Qty: 180 TABLET | Refills: 3 | Status: SHIPPED | OUTPATIENT
Start: 2022-11-28

## 2022-12-08 RX ORDER — DULOXETIN HYDROCHLORIDE 60 MG/1
60 CAPSULE, DELAYED RELEASE ORAL DAILY
Qty: 90 CAPSULE | Refills: 3 | Status: SHIPPED | OUTPATIENT
Start: 2022-12-08

## 2022-12-08 NOTE — TELEPHONE ENCOUNTER
PCP: Daron Beasley MD    Last appt: 9/28/2022  Future Appointments   Date Time Provider Bora Marin   3/29/2023 10:00 AM Daron Beasley MD [facility] BS AMB   5/15/2023 11:00 AM [facility] MRI 2 [facility] REG   6/14/2023  9:30 AM Sabrina Lu, ANP-C NEUM BS AMB       Requested Prescriptions     Pending Prescriptions Disp Refills    DULoxetine (CYMBALTA) 60 mg capsule 30 Capsule 11     Sig: Take 1 Capsule by mouth daily.        Prior labs and Blood pressures:  BP Readings from Last 3 Encounters:   09/28/22 120/82   06/13/22 (!) 140/82   06/10/21 120/78     Lab Results   Component Value Date/Time    Sodium 136 10/03/2022 10:09 AM    Potassium 5.1 10/03/2022 10:09 AM    Chloride 105 10/03/2022 10:09 AM    CO2 27 10/03/2022 10:09 AM    Anion gap 4 (L) 10/03/2022 10:09 AM    Glucose 160 (H) 10/03/2022 10:09 AM    BUN 14 10/03/2022 10:09 AM    Creatinine 0.64 10/03/2022 10:09 AM    BUN/Creatinine ratio 22 (H) 10/03/2022 10:09 AM    GFR est AA >60 10/03/2022 10:09 AM    GFR est non-AA >60 10/03/2022 10:09 AM    Calcium 9.2 10/03/2022 10:09 AM     Lab Results   Component Value Date/Time    Hemoglobin A1c 6.3 (H) 10/03/2022 10:09 AM    Hemoglobin A1c (POC) 6.3 04/16/2019 10:23 AM     Lab Results   Component Value Date/Time    Cholesterol, total 202 (H) 10/03/2022 10:09 AM    HDL Cholesterol 32 10/03/2022 10:09 AM    LDL,Direct 104 (H) 11/08/2020 03:26 AM    LDL, calculated 132 (H) 10/03/2022 10:09 AM    VLDL, calculated 38 10/03/2022 10:09 AM    Triglyceride 190 (H) 10/03/2022 10:09 AM    CHOL/HDL Ratio 6.3 (H) 10/03/2022 10:09 AM     Lab Results   Component Value Date/Time    Vitamin D 25-Hydroxy 32 01/18/2011 08:42 AM    VITAMIN D, 25-HYDROXY 37.7 12/23/2015 11:20 AM       Lab Results   Component Value Date/Time    TSH 0.85 11/08/2020 03:26 AM

## 2022-12-16 ENCOUNTER — OFFICE VISIT (OUTPATIENT)
Dept: ORTHOPEDIC SURGERY | Age: 77
End: 2022-12-16
Payer: MEDICARE

## 2022-12-16 VITALS — BODY MASS INDEX: 29.99 KG/M2 | HEIGHT: 65 IN | WEIGHT: 180 LBS

## 2022-12-16 DIAGNOSIS — M19.072 PRIMARY OSTEOARTHRITIS OF LEFT ANKLE: ICD-10-CM

## 2022-12-16 DIAGNOSIS — M19.011 PRIMARY OSTEOARTHRITIS, RIGHT SHOULDER: ICD-10-CM

## 2022-12-16 DIAGNOSIS — S93.402A MODERATE LEFT ANKLE SPRAIN, INITIAL ENCOUNTER: ICD-10-CM

## 2022-12-16 DIAGNOSIS — M25.572 LEFT ANKLE PAIN, UNSPECIFIED CHRONICITY: Primary | ICD-10-CM

## 2022-12-16 RX ORDER — DICLOFENAC SODIUM 75 MG/1
75 TABLET, DELAYED RELEASE ORAL 2 TIMES DAILY
Qty: 60 TABLET | Refills: 3 | Status: SHIPPED | OUTPATIENT
Start: 2022-12-16

## 2022-12-16 NOTE — PROGRESS NOTES
Celso Malloy (: 1945) is a 68 y.o. female, established patient, here for evaluation of the following chief complaint(s): Ankle Pain       ASSESSMENT/PLAN:  Below is the assessment and plan developed based on review of pertinent history, physical exam, labs, studies, and medications. We discussed possibility of corticosteroid injections if needed in the future. For now she would like to try an anti-inflammatory and she will try bracing for the left ankle. I will plan to see her back in the next few weeks as needed. 1. Left ankle pain, unspecified chronicity  -     XR ANKLE LT MIN 3 V; Future  -     diclofenac EC (VOLTAREN) 75 mg EC tablet; Take 1 Tablet by mouth two (2) times a day., Normal, Disp-60 Tablet, R-3  2. Primary osteoarthritis, right shoulder  -     diclofenac EC (VOLTAREN) 75 mg EC tablet; Take 1 Tablet by mouth two (2) times a day., Normal, Disp-60 Tablet, R-3  3. Moderate left ankle sprain, initial encounter  -     diclofenac EC (VOLTAREN) 75 mg EC tablet; Take 1 Tablet by mouth two (2) times a day., Normal, Disp-60 Tablet, R-3  4. Primary osteoarthritis of left ankle  -     diclofenac EC (VOLTAREN) 75 mg EC tablet; Take 1 Tablet by mouth two (2) times a day., Normal, Disp-60 Tablet, R-3      No follow-ups on file. SUBJECTIVE/OBJECTIVE:  Celso Malloy (: 1945) is a 68 y.o. female. She notes continued aching pain in the right shoulder. Pain worsens when she has perform repetitive lifting, pushing and pulling maneuvers. She notes that her  is wheelchair-bound at this point and she has to do most of the activities around her house including lifting firewood. She also has to help with transferring her . This causes increased shoulder pain. She notes an injury to the left ankle when she rolled it recently.         Allergies   Allergen Reactions    Iodine Shortness of Breath    Ciprofloxacin Unknown (comments)    Hydrocodone Unknown (comments)    Lithium Unknown (comments)    Pravastatin Myalgia    Simvastatin Unknown (comments)    Wellbutrin [Bupropion Hcl] Unknown (comments)       Current Outpatient Medications   Medication Sig    diclofenac EC (VOLTAREN) 75 mg EC tablet Take 1 Tablet by mouth two (2) times a day. DULoxetine (CYMBALTA) 60 mg capsule Take 1 Capsule by mouth daily. gemfibroziL (LOPID) 600 mg tablet TAKE 1 TABLET BY MOUTH  TWICE DAILY    montelukast (SINGULAIR) 10 mg tablet TAKE 1 TABLET BY MOUTH  DAILY    clopidogreL (PLAVIX) 75 mg tab Take 1 Tablet by mouth daily. multivitamin (ONE A DAY) tablet Take 1 Tablet by mouth daily. ascorbic acid, vitamin C, (VITAMIN C) 250 mg tablet Take  by mouth. zinc sulfate (ZINC-15 PO) Take  by mouth. atenoloL (TENORMIN) 50 mg tablet TAKE 1 AND 1/2 TABLETS BY  MOUTH DAILY    magnesium 250 mg tab Take  by mouth. cholecalciferol (VITAMIN D3) (1000 Units /25 mcg) tablet Take 1,000 Units by mouth daily. omega-3 fatty acids-vitamin e (Fish Oil) 1,000 mg cap Take 1 Cap by mouth two (2) times a day. lysine (L-LYSINE) 500 mg Tab tablet Take 500 mg by mouth as needed. fexofenadine (ALLEGRA) 180 mg tablet Take 180 mg by mouth daily. No current facility-administered medications for this visit.        Social History     Socioeconomic History    Marital status:      Spouse name: Not on file    Number of children: Not on file    Years of education: Not on file    Highest education level: Not on file   Occupational History    Not on file   Tobacco Use    Smoking status: Never    Smokeless tobacco: Never   Vaping Use    Vaping Use: Never used   Substance and Sexual Activity    Alcohol use: No     Comment: occ. wine    Drug use: No    Sexual activity: Yes     Partners: Male   Other Topics Concern    Not on file   Social History Narrative    Not on file     Social Determinants of Health     Financial Resource Strain: Not on file   Food Insecurity: Not on file   Transportation Needs: Not on file Physical Activity: Not on file   Stress: Not on file   Social Connections: Not on file   Intimate Partner Violence: Not on file   Housing Stability: Not on file       Past Surgical History:   Procedure Laterality Date    ENDOSCOPY, COLON, DIAGNOSTIC      HX CARPAL TUNNEL RELEASE      right    HX CHOLECYSTECTOMY      HX CYSTOCELE REPAIR      HX HYSTERECTOMY      HX ORTHOPAEDIC      left thumb surgery    HX UROLOGICAL      resection of bladder tumor; bladder sling    TX REPAIR OF RECTOCELE         Family History   Problem Relation Age of Onset    Diabetes Mother     Heart Disease Mother     Elevated Lipids Mother     Cancer Mother         basal cell ca    Hypertension Father     Cancer Father         basal cell ca    Cancer Sister         breast    Breast Cancer Sister         46s        OB History    No obstetric history on file. REVIEW OF SYSTEMS:  ROS    Positive for: Musculoskeletal  Last edited by Flora Groves on 12/16/2022 10:20 AM.        Patient denies any recent fever, chills, nausea, vomiting, chest pain, or shortness of breath. Vitals:  Ht 5' 5\" (1.651 m)   Wt 180 lb (81.6 kg)   BMI 29.95 kg/m²    Body mass index is 29.95 kg/m². PHYSICAL EXAM:  General exam: Patient is awake, alert, and oriented x3. Well-appearing. No acute distress. Ambulates with an antalgic gait. Heart/Lungs:  no respiratory distress, palpable pulses    Right shoulder: Neurovascular and sensory intact. There is decreased range of motion in all planes on active and passive exam.  There is crepitus with range of motion of the shoulder. There is pain with impingement testing including Ayon exam.  There is weakness noted with resisted abduction and resisted external rotation on exam.  Normal stability is noted. Left ankle: Mild swelling at the lateral ankle. Neurovascular and sensory intact. She does have some tenderness palpation at the lateral ankle. Slight limitation in range of motion.   No significant crepitus. Normal stability    IMAGING:    XR Results (most recent):  Results from Appointment encounter on 12/16/22    XR ANKLE LT MIN 3 V    Narrative  X-rays of the left ankle 3 views done today show evidence of joint space narrowing and osteophyte formation. No obvious acute fracture      Results from Appointment encounter on 03/21/22    XR KNEE LT MIN 4 V    Narrative  X-rays of the left knee 4 views done today show evidence of medial joint space narrowing and osteophyte formation. Lateral patellar tilt and early osteophyte formation noted as well. XR SHOULDER RT AP/LAT MIN 2 V    Narrative  X-rays of the right shoulder 3 views done today show evidence of mild glenohumeral joint space narrowing with osteophyte formation at the inferior glenohumeral joint. Type II acromion. Orders Placed This Encounter    XR ANKLE LT MIN 3 V     Standing Status:   Future     Number of Occurrences:   1     Standing Expiration Date:   3/16/2023    diclofenac EC (VOLTAREN) 75 mg EC tablet     Sig: Take 1 Tablet by mouth two (2) times a day. Dispense:  60 Tablet     Refill:  3              An electronic signature was used to authenticate this note.   -- Desmond Duvall, DO

## 2022-12-18 DIAGNOSIS — I10 ESSENTIAL HYPERTENSION, BENIGN: ICD-10-CM

## 2022-12-18 RX ORDER — ATENOLOL 50 MG/1
TABLET ORAL
Qty: 45 TABLET | Refills: 11 | Status: SHIPPED | OUTPATIENT
Start: 2022-12-18

## 2022-12-21 RX ORDER — DULOXETIN HYDROCHLORIDE 60 MG/1
60 CAPSULE, DELAYED RELEASE ORAL DAILY
Qty: 90 CAPSULE | Refills: 3 | Status: SHIPPED | OUTPATIENT
Start: 2022-12-21

## 2022-12-21 NOTE — TELEPHONE ENCOUNTER
PCP: Izaiah Muro MD    Last appt: 9/28/2022  Future Appointments   Date Time Provider Bora Marin   3/29/2023 10:00 AM Izaiah Muro MD [facility] AM[facility]   5/15/2023 11:00 AM [facility]   6/14/2023  9:30 AM Columba Lu, ANP-C [facility] BS AMB       Requested Prescriptions     Pending Prescriptions Disp Refills    DULoxetine (CYMBALTA) 60 mg capsule 90 Capsule 3     Sig: Take 1 Capsule by mouth daily.          Other Comments:

## 2022-12-21 NOTE — TELEPHONE ENCOUNTER
----- Message from Rosie Back sent at 12/8/2022 12:12 PM EST -----  Subject: Medication Problem    Medication: DULoxetine (CYMBALTA) 60 mg capsule  Dosage: 60 mg 1x a day  Ordering Provider: nic lopez     Question/Problem: Pt states this med was only given to her for 30 days and   she usually gets 90 days.  she is leaving on 12/20 to go out of town MD #   [id number]      Pharmacy: [facility] [address] ([address] [place] [address] ) - [address]    ---------------------------------------------------------------------------  --------------  [id number]  [phone]; OK to leave message on voicemail  ---------------------------------------------------------------------------  --------------    SCRIPT ANSWERS  Relationship to Patient: Self

## 2023-02-08 ENCOUNTER — TELEPHONE (OUTPATIENT)
Dept: NEUROLOGY | Age: 78
End: 2023-02-08

## 2023-02-08 NOTE — TELEPHONE ENCOUNTER
VOICEMAIL      Pt wanted us to know she went to [facility] ER with muscle spasms. Is leaning to the left now. Got a CT done and wants to send it over to us so maybe she doesn't have to get another one.  Please call [phone]

## 2023-02-13 NOTE — TELEPHONE ENCOUNTER
Confirmed patient idKatie Parada states that she is also now having cough fever and congestion. Advised that she get a covid test and test herself and gave information about next office visit. Patient states that they have been all over and she is at her daughters but will have the information sent to us.

## 2023-03-27 NOTE — PROGRESS NOTES
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 Relevant Cholesterol Meds:  Key Antihyperlipidemia Meds               ezetimibe (ZETIA) 10 mg tablet (Taking) Take 1 Tablet by mouth daily. Indications: high cholesterol    gemfibroziL (LOPID) 600 mg tablet (Taking) TAKE 1 TABLET BY MOUTH  TWICE DAILY    omega-3 fatty acids-vitamin e (Fish Oil) 1,000 mg cap (Taking) Take 1 Cap by mouth two (2) times a day. - Cholesterol at target: no   - Does patient meet USPSTF and ACC/AHA indications for pharmacotherapy (e.g., statin): yes   - GI symptoms with meds: NO   - Muscle aches with meds: NO   - Other Adverse effects with meds: NO   - Medication Plan: continue   - Notes: Adding on ezetimibe today after discussion regarding risk benefits; consider PCSK9 inhibitor; recheck lipid panel at next visit in 3 months time          I have reviewed the patient's medical history in detail and updated the computerized patient record.       We had a prolonged discussion about these complex clinical issues and went over the various important aspects to consider. All questions were answered. Advised the patient to call back or return to office if symptoms do not improve, change in nature, or persist.     The patient was given an after visit summary or informed of [facility] Access which includes patient instructions, diagnoses, current medications, & vitals. she expressed understanding with the diagnosis and plan. Harrison Gomes MD    Please note that this dictation was completed with EqualEyes, the computer voice recognition software. Quite often unanticipated grammatical, syntax, homophones, and other interpretive errors are inadvertently transcribed by the computer software. Please disregard these errors. Please excuse any errors that have escaped final proofreading.

## 2023-03-29 ENCOUNTER — OFFICE VISIT (OUTPATIENT)
Dept: INTERNAL MEDICINE CLINIC | Age: 78
End: 2023-03-29
Payer: MEDICARE

## 2023-03-29 VITALS
HEIGHT: 65 IN | RESPIRATION RATE: 16 BRPM | WEIGHT: 185.4 LBS | BODY MASS INDEX: 30.89 KG/M2 | OXYGEN SATURATION: 99 % | DIASTOLIC BLOOD PRESSURE: 70 MMHG | SYSTOLIC BLOOD PRESSURE: 116 MMHG | HEART RATE: 84 BPM

## 2023-03-29 DIAGNOSIS — I63.9 LEFT PONTINE STROKE (HCC): ICD-10-CM

## 2023-03-29 DIAGNOSIS — E78.2 MIXED HYPERLIPIDEMIA: ICD-10-CM

## 2023-03-29 DIAGNOSIS — I10 ESSENTIAL HYPERTENSION, BENIGN: ICD-10-CM

## 2023-03-29 DIAGNOSIS — L03.116 CELLULITIS OF LEFT LOWER EXTREMITY: ICD-10-CM

## 2023-03-29 DIAGNOSIS — C67.9 MALIGNANT NEOPLASM OF URINARY BLADDER, UNSPECIFIED SITE (HCC): ICD-10-CM

## 2023-03-29 DIAGNOSIS — Z00.00 ROUTINE ADULT HEALTH MAINTENANCE: Primary | ICD-10-CM

## 2023-03-29 DIAGNOSIS — F33.9 RECURRENT DEPRESSION (HCC): ICD-10-CM

## 2023-03-29 DIAGNOSIS — R73.03 PREDIABETES: ICD-10-CM

## 2023-03-29 PROCEDURE — G8400 PT W/DXA NO RESULTS DOC: HCPCS | Performed by: INTERNAL MEDICINE

## 2023-03-29 PROCEDURE — 3078F DIAST BP <80 MM HG: CPT | Performed by: INTERNAL MEDICINE

## 2023-03-29 PROCEDURE — G8427 DOCREV CUR MEDS BY ELIG CLIN: HCPCS | Performed by: INTERNAL MEDICINE

## 2023-03-29 PROCEDURE — 1101F PT FALLS ASSESS-DOCD LE1/YR: CPT | Performed by: INTERNAL MEDICINE

## 2023-03-29 PROCEDURE — 3074F SYST BP LT 130 MM HG: CPT | Performed by: INTERNAL MEDICINE

## 2023-03-29 PROCEDURE — 99214 OFFICE O/P EST MOD 30 MIN: CPT | Performed by: INTERNAL MEDICINE

## 2023-03-29 PROCEDURE — G8536 NO DOC ELDER MAL SCRN: HCPCS | Performed by: INTERNAL MEDICINE

## 2023-03-29 PROCEDURE — G8417 CALC BMI ABV UP PARAM F/U: HCPCS | Performed by: INTERNAL MEDICINE

## 2023-03-29 PROCEDURE — 1090F PRES/ABSN URINE INCON ASSESS: CPT | Performed by: INTERNAL MEDICINE

## 2023-03-29 PROCEDURE — 1123F ACP DISCUSS/DSCN MKR DOCD: CPT | Performed by: INTERNAL MEDICINE

## 2023-03-29 PROCEDURE — G9717 DOC PT DX DEP/BP F/U NT REQ: HCPCS | Performed by: INTERNAL MEDICINE

## 2023-03-29 RX ORDER — DOXYCYCLINE 100 MG/1
100 TABLET ORAL 2 TIMES DAILY
Qty: 14 TABLET | Refills: 0 | Status: SHIPPED | OUTPATIENT
Start: 2023-03-29 | End: 2023-04-05

## 2023-03-29 RX ORDER — EZETIMIBE 10 MG/1
10 TABLET ORAL DAILY
Qty: 90 TABLET | Refills: 3 | Status: SHIPPED | OUTPATIENT
Start: 2023-03-29

## 2023-03-29 RX ORDER — UREA 10 %
2 LOTION (ML) TOPICAL 2 TIMES DAILY
Qty: 28 TABLET | Refills: 0 | Status: SHIPPED | OUTPATIENT
Start: 2023-03-29 | End: 2023-04-05

## 2023-03-29 NOTE — PATIENT INSTRUCTIONS
Learning About the [facility] Diet  What is the Mediterranean diet? The Mediterranean diet is a style of eating rather than a diet plan. It features foods eaten in [place], [place], [place] and [place], and other countries along the [facility]. It emphasizes eating foods like fish, fruits, vegetables, beans, high-fiber breads and whole grains, nuts, and olive oil. This style of eating includes limited red meat, cheese, and sweets. Why choose the Mediterranean diet? A Mediterranean-style diet may improve heart health. It contains more fat than other heart-healthy diets. But the fats are mainly from nuts, unsaturated oils (such as fish oils and olive oil), and certain nut or seed oils (such as canola, soybean, or flaxseed oil). These fats may help protect the heart and blood vessels. How can you get started on the Mediterranean diet? Here are some things you can do to switch to a more Mediterranean way of eating. What to eat  Eat a variety of fruits and vegetables each day, such as grapes, blueberries, tomatoes, broccoli, peppers, figs, olives, spinach, eggplant, beans, lentils, and chickpeas. Eat a variety of whole-grain foods each day, such as oats, brown rice, and whole wheat bread, pasta, and couscous. Eat fish at least 2 times a week. Try tuna, salmon, mackerel, lake trout, herring, or sardines. Eat moderate amounts of low-fat dairy products, such as milk, cheese, or yogurt. Eat moderate amounts of poultry and eggs. Choose healthy (unsaturated) fats, such as nuts, olive oil, and certain nut or seed oils like canola, soybean, and flaxseed. Limit unhealthy (saturated) fats, such as butter, palm oil, and coconut oil. And limit fats found in animal products, such as meat and dairy products made with whole milk. Try to eat red meat only a few times a month in very small amounts. Limit sweets and desserts to only a few times a week. This includes sugar-sweetened drinks like soda.   The Mediterranean diet may also include red wine with your meal--1 glass each day for women and up to 2 glasses a day for men. Tips for eating at home  Use herbs, spices, garlic, lemon zest, and citrus juice instead of salt to add flavor to foods. Add avocado slices to your sandwich instead of casey. Have fish for lunch or dinner instead of red meat. Brush the fish with olive oil, and broil or grill it. Sprinkle your salad with seeds or nuts instead of cheese. Cook with olive or canola oil instead of butter or oils that are high in saturated fat. Switch from 2% milk or whole milk to 1% or fat-free milk. Dip raw vegetables in a vinaigrette dressing or hummus instead of dips made from mayonnaise or sour cream.  Have a piece of fruit for dessert instead of a piece of cake. Try baked apples, or have some dried fruit. Tips for eating out  Try broiled, grilled, baked, or poached fish instead of having it fried or breaded. Ask your  to have your meals prepared with olive oil instead of butter. Order dishes made with marinara sauce or sauces made from olive oil. Avoid sauces made from cream or mayonnaise. Choose whole-grain breads, whole wheat pasta and pizza crust, brown rice, beans, and lentils. Cut back on butter or margarine on bread. Instead, you can dip your bread in a small amount of olive oil. Ask for a side salad or grilled vegetables instead of french fries or chips. Where can you learn more? Go to http://www.britt.com/  Enter O407 in the search box to learn more about \"Learning About the Mediterranean Diet. \"  Current as of: May 9, 2022               Content Version: 13.4  © 6588-8018 Healthwise, Incorporated. Care instructions adapted under license by [employer] (which disclaims liability or warranty for this information).  If you have questions about a medical condition or this instruction, always ask your healthcare professional. Liss Savage disclaims any warranty or liability for your use of this information.

## 2023-03-29 NOTE — PROGRESS NOTES
Chief Complaint   Patient presents with    Follow-up   Visit Vitals  /70 (BP 1 Location: Right arm, BP Patient Position: Sitting, BP Cuff Size: Large adult)   Pulse 84   Resp 16   Ht 5' 5\" (1.651 m)   Wt 185 lb 6.4 oz (84.1 kg)   SpO2 99%   BMI 30.85 kg/m²         1. \"Have you been to the ER, urgent care clinic since your last visit? Hospitalized since your last visit? \"  Robinson Mascorro for back spasms in Feburary 2023    2. \"Have you seen or consulted any other health care providers outside of the [address] [place] [address] since your last visit? \" No     3. For patients aged 39-70: Has the patient had a colonoscopy / FIT/ Cologuard? No      If the patient is female:    4. For patients aged 41-77: Has the patient had a mammogram within the past 2 years? No      5. For patients aged 21-65: Has the patient had a pap smear?  No

## 2023-04-21 DIAGNOSIS — I63.22 BASILAR ARTERY OCCLUSION WITH CEREBRAL INFARCTION (HCC): ICD-10-CM

## 2023-04-21 DIAGNOSIS — I63.9 LEFT PONTINE STROKE (HCC): Primary | ICD-10-CM

## 2023-04-30 ENCOUNTER — TRANSCRIBE ORDERS (OUTPATIENT)
Facility: HOSPITAL | Age: 78
End: 2023-04-30

## 2023-04-30 DIAGNOSIS — I63.22 BASILAR ARTERY OCCLUSION WITH CEREBRAL INFARCTION (HCC): ICD-10-CM

## 2023-04-30 DIAGNOSIS — I63.9 LEFT PONTINE STROKE (HCC): Primary | ICD-10-CM

## 2023-05-15 ENCOUNTER — HOSPITAL ENCOUNTER (OUTPATIENT)
Facility: HOSPITAL | Age: 78
Discharge: HOME | End: 2023-05-18
Payer: MEDICARE

## 2023-05-15 DIAGNOSIS — I63.9 LEFT PONTINE STROKE (HCC): ICD-10-CM

## 2023-05-15 DIAGNOSIS — I63.22 BASILAR ARTERY OCCLUSION WITH CEREBRAL INFARCTION (HCC): ICD-10-CM

## 2023-05-15 PROCEDURE — 70544 MR ANGIOGRAPHY HEAD W/O DYE: CPT

## 2023-05-17 NOTE — RESULT ENCOUNTER NOTE
Results of the tests were reviewed in MyChart with the patient as follows:    Repeat MRI of the brain does not show any worsening  and that is good news

## 2023-07-11 ENCOUNTER — OFFICE VISIT (OUTPATIENT)
Facility: CLINIC | Age: 78
End: 2023-07-11
Payer: MEDICARE

## 2023-07-11 VITALS
HEIGHT: 65 IN | OXYGEN SATURATION: 93 % | HEART RATE: 59 BPM | DIASTOLIC BLOOD PRESSURE: 70 MMHG | WEIGHT: 186.9 LBS | SYSTOLIC BLOOD PRESSURE: 110 MMHG | TEMPERATURE: 97.8 F | BODY MASS INDEX: 31.14 KG/M2

## 2023-07-11 DIAGNOSIS — I63.22 BASILAR ARTERY OCCLUSION WITH CEREBRAL INFARCTION (HCC): ICD-10-CM

## 2023-07-11 DIAGNOSIS — E78.2 MIXED HYPERLIPIDEMIA: ICD-10-CM

## 2023-07-11 DIAGNOSIS — I63.9 LEFT PONTINE STROKE (HCC): ICD-10-CM

## 2023-07-11 DIAGNOSIS — G47.33 OSA (OBSTRUCTIVE SLEEP APNEA): ICD-10-CM

## 2023-07-11 DIAGNOSIS — C67.9 MALIGNANT NEOPLASM OF URINARY BLADDER, UNSPECIFIED SITE (HCC): ICD-10-CM

## 2023-07-11 DIAGNOSIS — J30.9 ALLERGIC RHINITIS, UNSPECIFIED SEASONALITY, UNSPECIFIED TRIGGER: ICD-10-CM

## 2023-07-11 DIAGNOSIS — I10 ESSENTIAL HYPERTENSION, BENIGN: ICD-10-CM

## 2023-07-11 DIAGNOSIS — R73.03 PREDIABETES: Primary | ICD-10-CM

## 2023-07-11 DIAGNOSIS — J45.20 MILD INTERMITTENT ASTHMA WITHOUT COMPLICATION: ICD-10-CM

## 2023-07-11 PROCEDURE — 99213 OFFICE O/P EST LOW 20 MIN: CPT | Performed by: NURSE PRACTITIONER

## 2023-07-11 PROCEDURE — 1123F ACP DISCUSS/DSCN MKR DOCD: CPT | Performed by: NURSE PRACTITIONER

## 2023-07-11 PROCEDURE — 3078F DIAST BP <80 MM HG: CPT | Performed by: NURSE PRACTITIONER

## 2023-07-11 PROCEDURE — 3074F SYST BP LT 130 MM HG: CPT | Performed by: NURSE PRACTITIONER

## 2023-07-11 RX ORDER — ALBUTEROL SULFATE 90 UG/1
2 AEROSOL, METERED RESPIRATORY (INHALATION) EVERY 6 HOURS PRN
Qty: 18 G | Refills: 3 | Status: SHIPPED | OUTPATIENT
Start: 2023-07-11

## 2023-07-11 ASSESSMENT — ENCOUNTER SYMPTOMS
EYE DISCHARGE: 0
COLOR CHANGE: 0
RECTAL PAIN: 0
EYE REDNESS: 0
COUGH: 0
CONSTIPATION: 0
NAUSEA: 0
CHOKING: 0
SINUS PAIN: 0
ABDOMINAL PAIN: 0
ANAL BLEEDING: 0
WHEEZING: 0
SORE THROAT: 0
DIARRHEA: 0
EYE PAIN: 0
BACK PAIN: 0
PHOTOPHOBIA: 0
FACIAL SWELLING: 0
VOMITING: 0
SINUS PRESSURE: 0
BLOOD IN STOOL: 0
SHORTNESS OF BREATH: 0
TROUBLE SWALLOWING: 0
APNEA: 0

## 2023-07-11 NOTE — PROGRESS NOTES
Anish Williamson (: 1945) is a 66 y.o. female here for evaluation of the following chief complaint(s):  New Patient (New to provider establish care)         SUBJECTIVE/OBJECTIVE:  HPI    Ms. Meet Campa here today to establish with new provider. She was a patient of Dr. Bria Carrasco. She would like to have refill on albuterol inhaler d/t feeling \"tight in chest.\" She reports was diagnosed with asthma at 10 yo. No longer CPAP- d/t their recall and never restarted.     Handwritten Rx- tubing and supplies    Allergies   Allergen Reactions    Iodine Shortness Of Breath    Bupropion      Other reaction(s): Unknown (comments)    Ciprofloxacin      Other reaction(s): Unknown (comments)    Hydrocodone      Other reaction(s): Unknown (comments)    Lithium      Other reaction(s): Unknown (comments)    Pravastatin Myalgia    Simvastatin      Other reaction(s): Unknown (comments)       Current Outpatient Medications   Medication Sig Dispense Refill    albuterol sulfate HFA (PROVENTIL HFA) 108 (90 Base) MCG/ACT inhaler Inhale 2 puffs into the lungs every 6 hours as needed for Wheezing 18 g 3    magnesium (MAGNESIUM-OXIDE) 250 MG TABS tablet Take by mouth      Omega-3 Fatty Acids (FISH OIL) 1000 MG capsule Take 1 capsule by mouth 2 times daily      Zinc Sulfate 66 MG TABS Take by mouth      ascorbic acid (VITAMIN C) 250 MG tablet Take by mouth      atenolol (TENORMIN) 50 MG tablet TAKE 1 AND 1/2 TABLETS BY  MOUTH DAILY      vitamin D (CHOLECALCIFEROL) 25 MCG (1000 UT) TABS tablet Take 1 tablet by mouth daily      clopidogrel (PLAVIX) 75 MG tablet Take 1 tablet by mouth daily      DULoxetine (CYMBALTA) 60 MG extended release capsule Take 1 capsule by mouth daily      fexofenadine (ALLEGRA) 180 MG tablet Take 1 tablet by mouth daily      gemfibrozil (LOPID) 600 MG tablet TAKE 1 TABLET BY MOUTH  TWICE DAILY      L-Lysine 500 MG TABS Take 500 mg by mouth as needed      montelukast (SINGULAIR) 10 MG tablet Take 1 tablet by mouth daily

## 2023-07-11 NOTE — PROGRESS NOTES
Chief Complaint   Patient presents with    New Patient     New to provider establish care     1. Have you been to the ER, urgent care clinic since your last visit? Hospitalized since your last visit? No    2. Have you seen or consulted any other health care providers outside of the [address] [place] [address] Avenue since your last visit? Include any pap smears or colon screening.  No

## 2023-07-12 LAB
ALBUMIN SERPL-MCNC: 4.3 G/DL (ref 3.5–5)
ALBUMIN/GLOB SERPL: 1.3 (ref 1.1–2.2)
ALP SERPL-CCNC: 76 U/L (ref 45–117)
ALT SERPL-CCNC: 23 U/L (ref 12–78)
ANION GAP SERPL CALC-SCNC: 4 MMOL/L (ref 5–15)
APPEARANCE UR: CLEAR
AST SERPL-CCNC: 12 U/L (ref 15–37)
BACTERIA URNS QL MICRO: NEGATIVE /HPF
BASOPHILS # BLD: 0 K/UL (ref 0–0.1)
BASOPHILS NFR BLD: 1 % (ref 0–1)
BILIRUB SERPL-MCNC: 0.7 MG/DL (ref 0.2–1)
BILIRUB UR QL: NEGATIVE
BUN SERPL-MCNC: 18 MG/DL (ref 6–20)
BUN/CREAT SERPL: 33 (ref 12–20)
CALCIUM SERPL-MCNC: 9.3 MG/DL (ref 8.5–10.1)
CHLORIDE SERPL-SCNC: 105 MMOL/L (ref 97–108)
CHOLEST SERPL-MCNC: 218 MG/DL
CO2 SERPL-SCNC: 30 MMOL/L (ref 21–32)
COLOR UR: NORMAL
CREAT SERPL-MCNC: 0.55 MG/DL (ref 0.55–1.02)
DIFFERENTIAL METHOD BLD: ABNORMAL
EOSINOPHIL # BLD: 0 K/UL (ref 0–0.4)
EOSINOPHIL NFR BLD: 0 % (ref 0–7)
EPITH CASTS URNS QL MICRO: NORMAL /LPF
ERYTHROCYTE [DISTWIDTH] IN BLOOD BY AUTOMATED COUNT: 13.6 % (ref 11.5–14.5)
EST. AVERAGE GLUCOSE BLD GHB EST-MCNC: 126 MG/DL
GLOBULIN SER CALC-MCNC: 3.2 G/DL (ref 2–4)
GLUCOSE SERPL-MCNC: 124 MG/DL (ref 65–100)
GLUCOSE UR STRIP.AUTO-MCNC: NEGATIVE MG/DL
HBA1C MFR BLD: 6 % (ref 4–5.6)
HCT VFR BLD AUTO: 45.3 % (ref 35–47)
HDLC SERPL-MCNC: 32 MG/DL
HDLC SERPL: 6.8 (ref 0–5)
HGB BLD-MCNC: 14.2 G/DL (ref 11.5–16)
HGB UR QL STRIP: NEGATIVE
HYALINE CASTS URNS QL MICRO: NORMAL /LPF (ref 0–5)
IMM GRANULOCYTES # BLD AUTO: 0 K/UL
IMM GRANULOCYTES NFR BLD AUTO: 0 %
KETONES UR QL STRIP.AUTO: NEGATIVE MG/DL
LDLC SERPL CALC-MCNC: 138.2 MG/DL (ref 0–100)
LEUKOCYTE ESTERASE UR QL STRIP.AUTO: NEGATIVE
LYMPHOCYTES # BLD: 2.1 K/UL (ref 0.8–3.5)
LYMPHOCYTES NFR BLD: 44 % (ref 12–49)
MCH RBC QN AUTO: 29.2 PG (ref 26–34)
MCHC RBC AUTO-ENTMCNC: 31.3 G/DL (ref 30–36.5)
MCV RBC AUTO: 93 FL (ref 80–99)
MONOCYTES # BLD: 0.5 K/UL (ref 0–1)
MONOCYTES NFR BLD: 12 % (ref 5–13)
NEUTS BAND NFR BLD MANUAL: 3 % (ref 0–6)
NEUTS SEG # BLD: 1.9 K/UL (ref 1.8–8)
NEUTS SEG NFR BLD: 40 % (ref 32–75)
NITRITE UR QL STRIP.AUTO: NEGATIVE
NRBC # BLD: 0 K/UL (ref 0–0.01)
NRBC BLD-RTO: 0 PER 100 WBC
PH UR STRIP: 7 (ref 5–8)
PLATELET # BLD AUTO: 125 K/UL (ref 150–400)
POTASSIUM SERPL-SCNC: 4.6 MMOL/L (ref 3.5–5.1)
PROT SERPL-MCNC: 7.5 G/DL (ref 6.4–8.2)
PROT UR STRIP-MCNC: NEGATIVE MG/DL
RBC # BLD AUTO: 4.87 M/UL (ref 3.8–5.2)
RBC #/AREA URNS HPF: NORMAL /HPF (ref 0–5)
RBC MORPH BLD: ABNORMAL
SODIUM SERPL-SCNC: 139 MMOL/L (ref 136–145)
SP GR UR REFRACTOMETRY: 1.02 (ref 1–1.03)
TRIGL SERPL-MCNC: 239 MG/DL
TSH SERPL DL<=0.05 MIU/L-ACNC: 1.03 UIU/ML (ref 0.36–3.74)
URINE CULTURE IF INDICATED: NORMAL
UROBILINOGEN UR QL STRIP.AUTO: 1 EU/DL (ref 0.2–1)
VLDLC SERPL CALC-MCNC: 47.8 MG/DL
WBC # BLD AUTO: 4.5 K/UL (ref 3.6–11)
WBC URNS QL MICRO: NORMAL /HPF (ref 0–4)

## 2023-07-25 DIAGNOSIS — E78.2 MIXED HYPERLIPIDEMIA: Primary | ICD-10-CM

## 2023-07-25 RX ORDER — ROSUVASTATIN CALCIUM 5 MG/1
5 TABLET, COATED ORAL NIGHTLY
Qty: 30 TABLET | Refills: 3 | Status: SHIPPED | OUTPATIENT
Start: 2023-07-25

## 2023-08-15 RX ORDER — CLOPIDOGREL BISULFATE 75 MG/1
75 TABLET ORAL DAILY
Qty: 100 TABLET | Refills: 2 | Status: SHIPPED | OUTPATIENT
Start: 2023-08-15

## 2023-10-05 RX ORDER — MONTELUKAST SODIUM 10 MG/1
10 TABLET ORAL DAILY
Qty: 90 TABLET | Refills: 0 | Status: SHIPPED | OUTPATIENT
Start: 2023-10-05

## 2023-10-05 RX ORDER — GEMFIBROZIL 600 MG/1
600 TABLET, FILM COATED ORAL 2 TIMES DAILY
Qty: 180 TABLET | Refills: 0 | Status: SHIPPED | OUTPATIENT
Start: 2023-10-05

## 2023-10-05 NOTE — TELEPHONE ENCOUNTER
RX refill request from the patient/pharmacy. Patient last seen 07-11-23 with labs, and next appt. scheduled for 12-12-23.     Requested Prescriptions     Pending Prescriptions Disp Refills    montelukast (SINGULAIR) 10 MG tablet 30 tablet 1     Sig: Take 1 tablet by mouth daily    gemfibrozil (LOPID) 600 MG tablet 60 tablet 1     Sig: Take 1 tablet by mouth 2 times daily

## 2023-10-13 RX ORDER — DULOXETIN HYDROCHLORIDE 60 MG/1
60 CAPSULE, DELAYED RELEASE ORAL DAILY
Qty: 90 CAPSULE | Refills: 0 | Status: SHIPPED | OUTPATIENT
Start: 2023-10-13

## 2023-11-13 ENCOUNTER — NURSE ONLY (OUTPATIENT)
Facility: CLINIC | Age: 78
End: 2023-11-13

## 2023-11-13 DIAGNOSIS — R73.09 ELEVATED GLUCOSE: ICD-10-CM

## 2023-11-13 DIAGNOSIS — R73.09 ELEVATED GLUCOSE: Primary | ICD-10-CM

## 2023-11-13 DIAGNOSIS — E78.2 MIXED HYPERLIPIDEMIA: ICD-10-CM

## 2023-11-14 LAB
ALBUMIN SERPL-MCNC: 4.1 G/DL (ref 3.5–5)
ALBUMIN/GLOB SERPL: 1.3 (ref 1.1–2.2)
ALP SERPL-CCNC: 72 U/L (ref 45–117)
ALT SERPL-CCNC: 17 U/L (ref 12–78)
ANION GAP SERPL CALC-SCNC: 8 MMOL/L (ref 5–15)
AST SERPL-CCNC: 11 U/L (ref 15–37)
BASOPHILS # BLD: 0 K/UL (ref 0–0.1)
BASOPHILS NFR BLD: 0 % (ref 0–1)
BILIRUB SERPL-MCNC: 0.5 MG/DL (ref 0.2–1)
BUN SERPL-MCNC: 20 MG/DL (ref 6–20)
BUN/CREAT SERPL: 29 (ref 12–20)
CALCIUM SERPL-MCNC: 9.9 MG/DL (ref 8.5–10.1)
CHLORIDE SERPL-SCNC: 107 MMOL/L (ref 97–108)
CHOLEST SERPL-MCNC: 207 MG/DL
CO2 SERPL-SCNC: 25 MMOL/L (ref 21–32)
CREAT SERPL-MCNC: 0.68 MG/DL (ref 0.55–1.02)
DIFFERENTIAL METHOD BLD: ABNORMAL
EOSINOPHIL # BLD: 0.2 K/UL (ref 0–0.4)
EOSINOPHIL NFR BLD: 4 % (ref 0–7)
ERYTHROCYTE [DISTWIDTH] IN BLOOD BY AUTOMATED COUNT: 13.3 % (ref 11.5–14.5)
EST. AVERAGE GLUCOSE BLD GHB EST-MCNC: 123 MG/DL
GLOBULIN SER CALC-MCNC: 3.1 G/DL (ref 2–4)
GLUCOSE SERPL-MCNC: 129 MG/DL (ref 65–100)
HBA1C MFR BLD: 5.9 % (ref 4–5.6)
HCT VFR BLD AUTO: 43.3 % (ref 35–47)
HDLC SERPL-MCNC: 33 MG/DL
HDLC SERPL: 6.3 (ref 0–5)
HGB BLD-MCNC: 13.8 G/DL (ref 11.5–16)
IMM GRANULOCYTES # BLD AUTO: 0 K/UL
IMM GRANULOCYTES NFR BLD AUTO: 0 %
LDLC SERPL CALC-MCNC: 146.8 MG/DL (ref 0–100)
LYMPHOCYTES # BLD: 1.5 K/UL (ref 0.8–3.5)
LYMPHOCYTES NFR BLD: 37 % (ref 12–49)
MCH RBC QN AUTO: 29.3 PG (ref 26–34)
MCHC RBC AUTO-ENTMCNC: 31.9 G/DL (ref 30–36.5)
MCV RBC AUTO: 91.9 FL (ref 80–99)
MONOCYTES # BLD: 0.8 K/UL (ref 0–1)
MONOCYTES NFR BLD: 19 % (ref 5–13)
NEUTS SEG # BLD: 1.6 K/UL (ref 1.8–8)
NEUTS SEG NFR BLD: 40 % (ref 32–75)
NRBC # BLD: 0 K/UL (ref 0–0.01)
NRBC BLD-RTO: 0 PER 100 WBC
PLATELET # BLD AUTO: 112 K/UL (ref 150–400)
POTASSIUM SERPL-SCNC: 5 MMOL/L (ref 3.5–5.1)
PROT SERPL-MCNC: 7.2 G/DL (ref 6.4–8.2)
RBC # BLD AUTO: 4.71 M/UL (ref 3.8–5.2)
RBC MORPH BLD: ABNORMAL
SODIUM SERPL-SCNC: 140 MMOL/L (ref 136–145)
TRIGL SERPL-MCNC: 136 MG/DL
VLDLC SERPL CALC-MCNC: 27.2 MG/DL
WBC # BLD AUTO: 4.1 K/UL (ref 3.6–11)

## 2023-12-12 ENCOUNTER — OFFICE VISIT (OUTPATIENT)
Facility: CLINIC | Age: 78
End: 2023-12-12
Payer: MEDICARE

## 2023-12-12 VITALS
DIASTOLIC BLOOD PRESSURE: 60 MMHG | BODY MASS INDEX: 31.37 KG/M2 | WEIGHT: 188.3 LBS | RESPIRATION RATE: 16 BRPM | SYSTOLIC BLOOD PRESSURE: 100 MMHG | OXYGEN SATURATION: 95 % | HEART RATE: 56 BPM | TEMPERATURE: 97.5 F | HEIGHT: 65 IN

## 2023-12-12 DIAGNOSIS — Z78.9 STATIN INTOLERANCE: ICD-10-CM

## 2023-12-12 DIAGNOSIS — B35.3 TINEA PEDIS OF RIGHT FOOT: ICD-10-CM

## 2023-12-12 DIAGNOSIS — R23.4 FISSURE IN SKIN OF BOTH FEET: Primary | ICD-10-CM

## 2023-12-12 DIAGNOSIS — E78.2 MIXED HYPERLIPIDEMIA: ICD-10-CM

## 2023-12-12 PROCEDURE — 1123F ACP DISCUSS/DSCN MKR DOCD: CPT | Performed by: NURSE PRACTITIONER

## 2023-12-12 PROCEDURE — 3078F DIAST BP <80 MM HG: CPT | Performed by: NURSE PRACTITIONER

## 2023-12-12 PROCEDURE — 3074F SYST BP LT 130 MM HG: CPT | Performed by: NURSE PRACTITIONER

## 2023-12-12 PROCEDURE — 99213 OFFICE O/P EST LOW 20 MIN: CPT | Performed by: NURSE PRACTITIONER

## 2023-12-12 RX ORDER — EZETIMIBE 10 MG/1
10 TABLET ORAL DAILY
COMMUNITY

## 2023-12-12 RX ORDER — AMMONIUM LACTATE 12 G/100G
CREAM TOPICAL
Qty: 385 G | Refills: 2 | Status: SHIPPED | OUTPATIENT
Start: 2023-12-12 | End: 2024-01-11

## 2023-12-12 RX ORDER — UREA 450 MG/G
1 CREAM TOPICAL 3 TIMES DAILY
Qty: 255 G | Refills: 1 | Status: SHIPPED | OUTPATIENT
Start: 2023-12-12 | End: 2023-12-12

## 2023-12-12 ASSESSMENT — ENCOUNTER SYMPTOMS
COLOR CHANGE: 0
SINUS PAIN: 0
ABDOMINAL PAIN: 0
CONSTIPATION: 0
BACK PAIN: 0
EYE PAIN: 0
NAUSEA: 0
TROUBLE SWALLOWING: 0
EYE DISCHARGE: 0
CHOKING: 0
APNEA: 0
DIARRHEA: 0
SHORTNESS OF BREATH: 0
ANAL BLEEDING: 0
BLOOD IN STOOL: 0
SORE THROAT: 0
SINUS PRESSURE: 0
FACIAL SWELLING: 0
RECTAL PAIN: 0
PHOTOPHOBIA: 0
VOMITING: 0
COUGH: 0
WHEEZING: 0
EYE REDNESS: 0

## 2024-01-22 NOTE — TELEPHONE ENCOUNTER
PCP: Martinez De La Garza APRN - NP    Last appt: 12/12/2023  Future Appointments   Date Time Provider Department Center   3/12/2024  8:00 AM LAB ONLY [facility] AM[facility]   6/12/2024  8:20 AM LAB ONLY [facility] AM[facility]   6/17/2024 11:00 AM Janet Dhillon, ANP [facility] AM[facility]   6/18/2024  9:30 AM Martinez De La Garza APRN - NP [facility] AM[facility]       Requested Prescriptions     Pending Prescriptions Disp Refills    montelukast (SINGULAIR) 10 MG tablet 90 tablet 0     Sig: Take 1 tablet by mouth daily    DULoxetine (CYMBALTA) 60 MG extended release capsule 90 capsule 0     Sig: Take 1 capsule by mouth daily       Prior labs and Blood pressures:  BP Readings from Last 3 Encounters:   12/12/23 100/60   07/11/23 110/70   06/14/23 118/62     Lab Results   Component Value Date/Time     11/13/2023 09:35 AM    K 5.0 11/13/2023 09:35 AM     11/13/2023 09:35 AM    CO2 25 11/13/2023 09:35 AM    BUN 20 11/13/2023 09:35 AM    GFRAA >60 10/03/2022 10:09 AM     No results found for: \"HBA1C\", \"LCQ3MTEA\"  Lab Results   Component Value Date/Time    CHOL 207 11/13/2023 09:35 AM    HDL 33 11/13/2023 09:35 AM    VLDL 31 02/23/2021 03:28 PM     No results found for: \"VITD3\", \"VD3RIA\"        Lab Results   Component Value Date/Time    TSH 0.85 11/08/2020 03:26 AM

## 2024-01-23 RX ORDER — DULOXETIN HYDROCHLORIDE 60 MG/1
60 CAPSULE, DELAYED RELEASE ORAL DAILY
Qty: 90 CAPSULE | Refills: 1 | Status: SHIPPED | OUTPATIENT
Start: 2024-01-23

## 2024-01-23 RX ORDER — MONTELUKAST SODIUM 10 MG/1
10 TABLET ORAL DAILY
Qty: 90 TABLET | Refills: 1 | Status: SHIPPED | OUTPATIENT
Start: 2024-01-23

## 2024-04-23 RX ORDER — CLOPIDOGREL BISULFATE 75 MG/1
75 TABLET ORAL DAILY
Qty: 90 TABLET | Refills: 3 | Status: SHIPPED | OUTPATIENT
Start: 2024-04-23

## 2024-04-24 RX ORDER — DULOXETIN HYDROCHLORIDE 60 MG/1
60 CAPSULE, DELAYED RELEASE ORAL DAILY
Qty: 30 CAPSULE | Refills: 0 | Status: SHIPPED | OUTPATIENT
Start: 2024-04-24

## 2024-04-24 NOTE — TELEPHONE ENCOUNTER
RX refill request from the patient/pharmacy. Patient last seen -2023 with labs, and does not have an appointment with a PCP.  Requested Prescriptions     Pending Prescriptions Disp Refills    DULoxetine (CYMBALTA) 60 MG extended release capsule 30 capsule 0     Sig: Take 1 capsule by mouth daily

## 2024-06-07 ENCOUNTER — CLINICAL DOCUMENTATION (OUTPATIENT)
Age: 79
End: 2024-06-07

## 2024-06-07 NOTE — PROGRESS NOTES
Faxed Pre- Procedure Cardiovascular risk assessment to Dr. Peralta [phone] confirmation complete received.

## 2024-06-14 NOTE — PROGRESS NOTES
"Prescription instructions state \"Topical 2 times daily, taking once daily\"     Pharmacy would like clarification on frequency of usage. Please review and advise  " Patient notified of lab results

## 2024-06-17 ENCOUNTER — OFFICE VISIT (OUTPATIENT)
Age: 79
End: 2024-06-17
Payer: MEDICARE

## 2024-06-17 VITALS
BODY MASS INDEX: 31.19 KG/M2 | HEIGHT: 65 IN | RESPIRATION RATE: 15 BRPM | TEMPERATURE: 97.2 F | SYSTOLIC BLOOD PRESSURE: 110 MMHG | WEIGHT: 187.2 LBS | OXYGEN SATURATION: 95 % | DIASTOLIC BLOOD PRESSURE: 60 MMHG | HEART RATE: 61 BPM

## 2024-06-17 DIAGNOSIS — Z86.73 HISTORY OF STROKE: Primary | ICD-10-CM

## 2024-06-17 DIAGNOSIS — F43.21 GRIEF REACTION: ICD-10-CM

## 2024-06-17 DIAGNOSIS — R73.02 IMPAIRED GLUCOSE TOLERANCE: ICD-10-CM

## 2024-06-17 DIAGNOSIS — Z78.9 STATIN INTOLERANCE: ICD-10-CM

## 2024-06-17 DIAGNOSIS — E78.2 MIXED HYPERLIPIDEMIA: ICD-10-CM

## 2024-06-17 DIAGNOSIS — I63.22 BASILAR ARTERY OCCLUSION WITH CEREBRAL INFARCTION (HCC): ICD-10-CM

## 2024-06-17 DIAGNOSIS — I10 ESSENTIAL HYPERTENSION, BENIGN: ICD-10-CM

## 2024-06-17 PROBLEM — F43.20 GRIEF REACTION: Status: ACTIVE | Noted: 2024-06-17

## 2024-06-17 PROCEDURE — G8417 CALC BMI ABV UP PARAM F/U: HCPCS | Performed by: PSYCHIATRY & NEUROLOGY

## 2024-06-17 PROCEDURE — G8427 DOCREV CUR MEDS BY ELIG CLIN: HCPCS | Performed by: PSYCHIATRY & NEUROLOGY

## 2024-06-17 PROCEDURE — 1123F ACP DISCUSS/DSCN MKR DOCD: CPT | Performed by: PSYCHIATRY & NEUROLOGY

## 2024-06-17 PROCEDURE — G8399 PT W/DXA RESULTS DOCUMENT: HCPCS | Performed by: PSYCHIATRY & NEUROLOGY

## 2024-06-17 PROCEDURE — 3074F SYST BP LT 130 MM HG: CPT | Performed by: PSYCHIATRY & NEUROLOGY

## 2024-06-17 PROCEDURE — 3078F DIAST BP <80 MM HG: CPT | Performed by: PSYCHIATRY & NEUROLOGY

## 2024-06-17 PROCEDURE — 99215 OFFICE O/P EST HI 40 MIN: CPT | Performed by: PSYCHIATRY & NEUROLOGY

## 2024-06-17 PROCEDURE — 1036F TOBACCO NON-USER: CPT | Performed by: PSYCHIATRY & NEUROLOGY

## 2024-06-17 PROCEDURE — 1090F PRES/ABSN URINE INCON ASSESS: CPT | Performed by: PSYCHIATRY & NEUROLOGY

## 2024-06-17 ASSESSMENT — PATIENT HEALTH QUESTIONNAIRE - PHQ9
1. LITTLE INTEREST OR PLEASURE IN DOING THINGS: SEVERAL DAYS
SUM OF ALL RESPONSES TO PHQ QUESTIONS 1-9: 2
SUM OF ALL RESPONSES TO PHQ9 QUESTIONS 1 & 2: 2
SUM OF ALL RESPONSES TO PHQ QUESTIONS 1-9: 2
SUM OF ALL RESPONSES TO PHQ QUESTIONS 1-9: 2
2. FEELING DOWN, DEPRESSED OR HOPELESS: SEVERAL DAYS
SUM OF ALL RESPONSES TO PHQ QUESTIONS 1-9: 2

## 2024-06-17 NOTE — PATIENT INSTRUCTIONS
As a reminder:   Please come to your appointment 15 minutes before your office appointment.  This way, you can get checked in at the  and checked in by the nursing staff so you have the full allotment of time with your provider for your visit.  Please bring an up-to-date and accurate list of all your medications.  Or bring all your active prescription bottles with you at the time of your office visit and this includes over-the-counter medications so we can make sure that your medication list is up-to-date.  If you are scheduled for a virtual visit, please be aware that the  will need to check you in and usually the day before to verify insurance and collect co-pays as appropriate.  Please be prepared for the second call which will be from the nurse to go over your medications and any other vital information.  This will probably be done 30 minutes prior to your visit.  The reason why we do this early is that you can get the full benefit of your appointment time with your provider.  Finally you will be given the link for your virtual visit please click into your link 10 minutes prior to your appointment and please wait patiently for the provider to join you        As per discussion       Please be gentle with yourself as you process the recent loss of your . Give yourself permission to grieve and be with your family and friends who can share in the memories you have together. Please let us know if there is any need for counseling in the future. Please continue taking the Plavix as prescribed, this is to help prevent future strokes. Additionally, follow up with your primary care for regular diabetes, cholesterol, and blood pressure readings. Your goal of your LDL cholesterol is <70. Your goal blood pressure is <140/<90. Your goal HbA1C is <7%. A heart healthy diet and regular exercise of >150 minutes per week will help with this. Let us know if you would like to pursue Physical Therapy for

## 2024-06-17 NOTE — ASSESSMENT & PLAN NOTE
Discussed importance of allowing time to process loss of her  of 65 years and take time to be with family, friends. She denies dysphoric mood, anxiety, thoughts of self harm. She will let us know if this decision changes. She denies need for counseling.

## 2024-06-17 NOTE — ASSESSMENT & PLAN NOTE
Mid basilar, Left pontine stroke   Patient is to continue on Plavix;  is statin intolerant     Patient reports her comorbid conditions are well managed by PCP.      RECOMMENDATIONS:  - BP goal is less than 140/90  - Goal HbA1c is less than 7.   - LDL less than 70     For voiced concerns regarding balance/veering to the left, we did discuss this may be partly d/t hx of stroke. Low suspicion that this is an new/evolving stroke. Offered PT which the patient defers. She will let us know if this decision changes.

## 2024-06-17 NOTE — ASSESSMENT & PLAN NOTE
BP is normotensive, continue current medications and follow-up with primary care as appropriate  In the past she has discussed the DASH diet/ low-sodium diet to help with blood sugar with LUCY Dhillon NP.

## 2024-06-17 NOTE — PROGRESS NOTES
Neurology Clinic Follow up Note    Patient ID:   Marline Ellsworth  1945  79 y.o. female  [id number]      Chief Complaint   Patient presents with    Follow-up     Follow up on stroke from 2020.  Patient state that she does noam lurch to the left sometimes and sometimes has to grasp for words.       Last Appointment With Me:    None, last seen by LUCY Dhillon NP, 6/14/2023  \" Left pontine stroke (HCC)   Stable without recurrence of symptoms  Patient is to continue on Plavix;  is statin intolerant  Patient is to continue to work to all of his comorbid conditions as managed by PCP and other specialists as appropriate  RECOMMENDATIONS:  - BP goal is less than 140/90  - Goal HbA1c is less than 7.   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)  MRI completed May 2023 shows mild stenosis mid basilar artery improved since May 2021  Continue on Plavix  Continue to work on risk factors with PCP related to cholesterol and blood pressure  Patient will be due for repeat MRA next year and will get that set up for May 2024 prior to her follow-up visit  Patient had concerns about possible restrictions in activity at this point she does not have any restrictions but emphasized the need to use [employer] for example not to go out in August to do yard work and to make sure she paces herself more just due to general health issues and her age than anything else  Essential hypertension, benign   Normotensive readings today in the office continue on atenolol follow-up with primary care as appropriate  In the past we have discussed the DASH diet/ low-sodium diet to help with blood sugar  Mixed hyperlipidemia:   Patient remains on gemfibrozil is statin intolerant is managed and followed by PCP.   From a neuro perspective LDL target should be less than 70 but this may be difficult to obtain  We will defer to primary care for further   Patient is known to the practice and was previously seen by Dr Miller

## 2024-06-17 NOTE — ASSESSMENT & PLAN NOTE
Stable without recurrence of symptoms  Patient is to continue on Plavix;  is statin intolerant     Patient reports her comorbid conditions are managed by PCP.      RECOMMENDATIONS:  - BP goal is less than 140/90  - Goal HbA1c is less than 7.   - LDL less than 70

## 2024-06-17 NOTE — ASSESSMENT & PLAN NOTE
MRA completed May 2023 shows mild stenosis mid basilar artery improved since May 2021, results reviewed with the patient.   Continue on Plavix  Continue to work on risk factors with PCP related to cholesterol and blood pressure

## 2024-06-17 NOTE — ASSESSMENT & PLAN NOTE
Most recent HbA1C in the EMR is 5.9% in 11/13/2023. She reports HbA1C is being monitored regularly by PCP. Follow up with PCP as indicated.

## 2024-06-17 NOTE — ASSESSMENT & PLAN NOTE
She reports cholesterol is being monitored regularly by PCP. Aware goal LDL is <70. Follow up with PCP as indicated.

## 2024-08-27 ENCOUNTER — TELEPHONE (OUTPATIENT)
Age: 79
End: 2024-08-27

## 2024-08-27 NOTE — TELEPHONE ENCOUNTER
Spoke with patient's daughter, Odalys, on PHI., ph # [phone]  Verified patient with two patient identifiers.    States pt was taking colonoscopy prep on 8-22-24.  Got weak, fell off toilet.  Bumped her head, had a goose egg on head, left eye area bruised.  Did not proceed wth the colonoscopy.  Family stayed with pt, got her hydrated, checked pupils during the night, etc.  Did not go to ER..    Almost fell yesterday and is having increasing number of near-falls over the past 6 months.  Still leaning to the left at times but catches herself before she falls.    Is also c/o vertigo for 2 wks, saw PCP who advised if things do not improve, he will order P.T. for pt.    Also feels memory is worsening over the last 6 months.  Asking if pt should have testing to see if her memory is worsening over the past 6 months.    Asking if anything they can do for pt to improve strength, memory.    They would like to go ahead and have the MRI/MRA orders entered so they can schedule tests for 2025..    Pls advise          this Pt's daughter, Odalys (on phi) is on the line wanting to speak with Nurse as Pt had a fall. are u available?

## 2024-08-28 NOTE — TELEPHONE ENCOUNTER
Spoke with patient's daughter, Odalys.  Verified patient with two patient identifiers.    Advised per Mirian, complicated case.  Need to see pt in office.  Will ask PSR to call Odalys to schedule pt appt.  Asking for 8-.      Odalys verbalized understanding.

## 2024-08-28 NOTE — TELEPHONE ENCOUNTER
Forwarded to PSR to call to schedule appt.      Janet Dhillon, ANP sent to Ting Turpin LPN  Caller: Unspecified (Yesterday,  3:52 PM)    Clearly things are complicated.  Please put this patient in with any provider who has availability

## 2024-08-29 ENCOUNTER — OFFICE VISIT (OUTPATIENT)
Age: 79
End: 2024-08-29
Payer: MEDICARE

## 2024-08-29 VITALS
SYSTOLIC BLOOD PRESSURE: 128 MMHG | OXYGEN SATURATION: 96 % | HEART RATE: 64 BPM | DIASTOLIC BLOOD PRESSURE: 88 MMHG | TEMPERATURE: 97.5 F | BODY MASS INDEX: 31.72 KG/M2 | RESPIRATION RATE: 16 BRPM | WEIGHT: 190.4 LBS | HEIGHT: 65 IN

## 2024-08-29 DIAGNOSIS — R41.3 DISTURBANCE OF MEMORY: ICD-10-CM

## 2024-08-29 DIAGNOSIS — R55 CONVULSIVE SYNCOPE: ICD-10-CM

## 2024-08-29 DIAGNOSIS — R41.82 ACUTE ALTERATION IN MENTAL STATUS: ICD-10-CM

## 2024-08-29 DIAGNOSIS — I63.22 BASILAR ARTERY OCCLUSION WITH CEREBRAL INFARCTION (HCC): ICD-10-CM

## 2024-08-29 DIAGNOSIS — Z86.73 HISTORY OF STROKE: ICD-10-CM

## 2024-08-29 DIAGNOSIS — R42 POST-CONCUSSION VERTIGO: Primary | ICD-10-CM

## 2024-08-29 DIAGNOSIS — G44.309 POST-CONCUSSION HEADACHE: ICD-10-CM

## 2024-08-29 DIAGNOSIS — F07.81 POST-CONCUSSION VERTIGO: Primary | ICD-10-CM

## 2024-08-29 DIAGNOSIS — I65.23 BILATERAL CAROTID ARTERY STENOSIS: ICD-10-CM

## 2024-08-29 PROCEDURE — 99215 OFFICE O/P EST HI 40 MIN: CPT | Performed by: PSYCHIATRY & NEUROLOGY

## 2024-08-29 PROCEDURE — 1123F ACP DISCUSS/DSCN MKR DOCD: CPT | Performed by: PSYCHIATRY & NEUROLOGY

## 2024-08-29 PROCEDURE — G8427 DOCREV CUR MEDS BY ELIG CLIN: HCPCS | Performed by: PSYCHIATRY & NEUROLOGY

## 2024-08-29 PROCEDURE — 3074F SYST BP LT 130 MM HG: CPT | Performed by: PSYCHIATRY & NEUROLOGY

## 2024-08-29 PROCEDURE — G8417 CALC BMI ABV UP PARAM F/U: HCPCS | Performed by: PSYCHIATRY & NEUROLOGY

## 2024-08-29 PROCEDURE — 1036F TOBACCO NON-USER: CPT | Performed by: PSYCHIATRY & NEUROLOGY

## 2024-08-29 PROCEDURE — 3079F DIAST BP 80-89 MM HG: CPT | Performed by: PSYCHIATRY & NEUROLOGY

## 2024-08-29 PROCEDURE — 1090F PRES/ABSN URINE INCON ASSESS: CPT | Performed by: PSYCHIATRY & NEUROLOGY

## 2024-08-29 PROCEDURE — G8399 PT W/DXA RESULTS DOCUMENT: HCPCS | Performed by: PSYCHIATRY & NEUROLOGY

## 2024-08-29 ASSESSMENT — PATIENT HEALTH QUESTIONNAIRE - PHQ9
1. LITTLE INTEREST OR PLEASURE IN DOING THINGS: NOT AT ALL
SUM OF ALL RESPONSES TO PHQ QUESTIONS 1-9: 0
2. FEELING DOWN, DEPRESSED OR HOPELESS: NOT AT ALL
SUM OF ALL RESPONSES TO PHQ QUESTIONS 1-9: 0
SUM OF ALL RESPONSES TO PHQ QUESTIONS 1-9: 0
SUM OF ALL RESPONSES TO PHQ9 QUESTIONS 1 & 2: 0
SUM OF ALL RESPONSES TO PHQ QUESTIONS 1-9: 0

## 2024-08-30 DIAGNOSIS — I65.23 BILATERAL CAROTID ARTERY STENOSIS: ICD-10-CM

## 2024-08-30 DIAGNOSIS — I63.22 BASILAR ARTERY OCCLUSION WITH CEREBRAL INFARCTION (HCC): Primary | ICD-10-CM

## 2024-08-30 DIAGNOSIS — R55 CONVULSIVE SYNCOPE: ICD-10-CM

## 2024-08-30 DIAGNOSIS — R41.82 ACUTE ALTERATION IN MENTAL STATUS: ICD-10-CM

## 2024-08-30 NOTE — PROGRESS NOTES
Consult  REFERRED BY:  No primary care provider on file.    CHIEF COMPLAINT: Patient worked in on urgent work in basis after I have not seen the patient in 4 years, after she had a recent episode of passing out after a colonoscopy prep and hitting her head, and having a concussion, and is sent to us to check her neurologically to make sure there is nothing else going on after her head injury.  She has had headaches, and posttraumatic vertigo, but is slowly getting better and not had any major change in her cognitive function.      Subjective:     Marline Ellsworth is a 79 y.o. right-handed  female that we worked in on urgent work in basis after I have not seen the patient in 4 years, after she had a recent episode of passing out after a colonoscopy prep and hitting her head, and having a concussion, and is sent to us to check her neurologically to make sure there is nothing else going on after her head injury.  She has had headaches, and posttraumatic vertigo, but is slowly getting better and not had any major change in her cognitive function.  She has no recollection of the passing out but did have a lot of diarrhea and vomiting associated with the GI prep and most likely just had low blood pressure, but to be sure we will check an EEG and an MRA of the brain because of her previous basilar artery stenosis, and stroke and we will check a carotid Doppler to make sure there is no insufficiency or hypoperfusion.  She had a previous history of stroke with a small pontine lacunar infarct back in 2020, has not had any recurrent stroke since then.  Because of her head injury we will check an MRI of the brain and MRA of the brain to make sure the basal artery does not show any progressive disease.  She is also having more memory problems, so we will send her for neuropsych testing at the suggestion of her daughter, even though she scored 30 out of 30 on her Mini-Mental status examination today in the office.  The  in over 4 years, and we will check an ambulatory 24-hour EEG just in case to make sure there is no seizure.  The daughter is complaining of some memory problems even though the patient scored 30 out of 30 on a Mini-Mental status exam so we will get neuropsych testing and see if she has any problem there.  We reviewed her records, and her old records, from 4 years ago when I saw her, I discussed her diagnosis and prognosis and treatment options and the family agrees with plans as above.  She will call to me if there is any further problem, we will see her in follow-up in 3 to 6 months time or earlier if need be.  45-minute spent with the patient and the daughter going over all this in detail and discussing her diagnosis prognosis and treatment options.    Signed By: Tyler Miller MD     August 29, 2024       CC: No primary care provider on file.  FAX: None

## 2024-10-04 ENCOUNTER — HOSPITAL ENCOUNTER (OUTPATIENT)
Facility: HOSPITAL | Age: 79
Discharge: HOME | End: 2024-10-07
Attending: PSYCHIATRY & NEUROLOGY
Payer: MEDICARE

## 2024-10-04 ENCOUNTER — HOSPITAL ENCOUNTER (OUTPATIENT)
Facility: HOSPITAL | Age: 79
Discharge: HOME | End: 2024-10-06
Attending: PSYCHIATRY & NEUROLOGY
Payer: MEDICARE

## 2024-10-04 DIAGNOSIS — R55 CONVULSIVE SYNCOPE: ICD-10-CM

## 2024-10-04 DIAGNOSIS — G44.309 POST-CONCUSSION HEADACHE: ICD-10-CM

## 2024-10-04 DIAGNOSIS — F07.81 POST-CONCUSSION VERTIGO: ICD-10-CM

## 2024-10-04 DIAGNOSIS — R42 POST-CONCUSSION VERTIGO: ICD-10-CM

## 2024-10-04 DIAGNOSIS — I65.23 BILATERAL CAROTID ARTERY STENOSIS: ICD-10-CM

## 2024-10-04 DIAGNOSIS — Z86.73 HISTORY OF STROKE: ICD-10-CM

## 2024-10-04 DIAGNOSIS — I63.22 BASILAR ARTERY OCCLUSION WITH CEREBRAL INFARCTION (HCC): ICD-10-CM

## 2024-10-04 LAB
VAS LEFT CCA DIST EDV: 17.9 CM/S
VAS LEFT CCA DIST PSV: 68.3 CM/S
VAS LEFT CCA PROX EDV: 26.5 CM/S
VAS LEFT CCA PROX PSV: 96.5 CM/S
VAS LEFT ECA EDV: 9.3 CM/S
VAS LEFT ECA PSV: 100.2 CM/S
VAS LEFT ICA DIST EDV: 21.5 CM/S
VAS LEFT ICA DIST PSV: 63.3 CM/S
VAS LEFT ICA MID EDV: 29.2 CM/S
VAS LEFT ICA MID PSV: 85.3 CM/S
VAS LEFT ICA PROX EDV: 19.3 CM/S
VAS LEFT ICA PROX PSV: 61.1 CM/S
VAS LEFT ICA/CCA PSV: 1.2 NO UNITS
VAS LEFT SUBCLAVIAN PROX EDV: 0 CM/S
VAS LEFT SUBCLAVIAN PROX PSV: 198.4 CM/S
VAS LEFT VERTEBRAL EDV: 15.4 CM/S
VAS LEFT VERTEBRAL PSV: 45.3 CM/S
VAS RIGHT CCA DIST EDV: 19.3 CM/S
VAS RIGHT CCA DIST PSV: 87.4 CM/S
VAS RIGHT CCA PROX EDV: 17.1 CM/S
VAS RIGHT CCA PROX PSV: 82 CM/S
VAS RIGHT ECA EDV: 8.4 CM/S
VAS RIGHT ECA PSV: 116.2 CM/S
VAS RIGHT ICA DIST EDV: 19.8 CM/S
VAS RIGHT ICA DIST PSV: 67.2 CM/S
VAS RIGHT ICA MID EDV: 29.2 CM/S
VAS RIGHT ICA MID PSV: 88.5 CM/S
VAS RIGHT ICA PROX EDV: 25.9 CM/S
VAS RIGHT ICA PROX PSV: 69.8 CM/S
VAS RIGHT ICA/CCA PSV: 1 NO UNITS
VAS RIGHT SUBCLAVIAN PROX EDV: 0 CM/S
VAS RIGHT SUBCLAVIAN PROX PSV: 165.5 CM/S
VAS RIGHT VERTEBRAL EDV: 10.4 CM/S
VAS RIGHT VERTEBRAL PSV: 41.7 CM/S

## 2024-10-04 PROCEDURE — A9579 GAD-BASE MR CONTRAST NOS,1ML: HCPCS | Performed by: PSYCHIATRY & NEUROLOGY

## 2024-10-04 PROCEDURE — 6360000004 HC RX CONTRAST MEDICATION: Performed by: PSYCHIATRY & NEUROLOGY

## 2024-10-04 PROCEDURE — 70553 MRI BRAIN STEM W/O & W/DYE: CPT

## 2024-10-04 PROCEDURE — 93880 EXTRACRANIAL BILAT STUDY: CPT

## 2024-10-04 PROCEDURE — 70544 MR ANGIOGRAPHY HEAD W/O DYE: CPT

## 2024-10-04 RX ADMIN — GADOTERIDOL 18 ML: 279.3 INJECTION, SOLUTION INTRAVENOUS at 10:54

## 2024-10-08 ENCOUNTER — CLINICAL DOCUMENTATION (OUTPATIENT)
Age: 79
End: 2024-10-08

## 2024-10-08 NOTE — PROGRESS NOTES
I called the patient, let her know the MRI and MRA of the brain were stable, just old microvascular disease and age-related changes and she said thank you and she will continue the current therapy

## 2024-11-05 ENCOUNTER — HOSPITAL ENCOUNTER (OUTPATIENT)
Facility: HOSPITAL | Age: 79
Discharge: HOME | End: 2024-11-08
Attending: PSYCHIATRY & NEUROLOGY
Payer: MEDICARE

## 2024-11-05 DIAGNOSIS — R55 CONVULSIVE SYNCOPE: ICD-10-CM

## 2024-11-05 DIAGNOSIS — I63.22 BASILAR ARTERY OCCLUSION WITH CEREBRAL INFARCTION (HCC): ICD-10-CM

## 2024-11-05 DIAGNOSIS — R41.82 ACUTE ALTERATION IN MENTAL STATUS: ICD-10-CM

## 2024-11-05 DIAGNOSIS — I65.23 BILATERAL CAROTID ARTERY STENOSIS: ICD-10-CM

## 2024-11-05 PROCEDURE — 95708 EEG WO VID EA 12-26HR UNMNTR: CPT

## 2024-11-06 NOTE — PROCEDURES
[facility]              [address] [id number]                                   EEG      PATIENT NAME: WIN COLLADO                 : 1945  MED REC NO: [id number]                       ROOM:   ACCOUNT NO: [id number]                       ADMIT DATE: 2024  PROVIDER: Tyler Miller MD    DATE OF SERVICE:  2024    REFERRING PHYSICIAN:  TYLER MILLER    This is a 24-hour ambulatory EEG recording.    CLINICAL INDICATION:  The patient is a 79-year-old female with a history of spells of sudden falls and passing out.  EEG to rule out seizures, rule out epilepsy, rule out convulsive syncope.    EEG CLASSIFICATION:  Essentially normal ambulatory EEG with prolonged duration.    DESCRIPTION OF THE RECORD:  This is a 16-channel prolonged EEG recording on the patient to evaluate for possible passing out spells and rule out seizures.  This study began on 2024 at approximately 9:26 a.m. and ended at 2024 at approximately 2:06 p.m. for a total time of study of 4 hours and 40 minutes.  Study was terminated abruptly for unclear reason.  During this time, however, on the recording, the patient was seen not to have any spells of clear focal slowing.  No clear spike or spike-and-wave discharges.  No recorded electrographic or dysrhythmic spells of any type seen.  The patient did enter stage 2 sleep with K complexes and sleep spindles seen in central head regions.  Hyperventilation was not performed.  Photic stimulation was not performed.  The study essentially was of good quality, but there was some mild movement and muscle artifact seen at times in the recording, but the study overall was technically very interpretable.  On the patient's clinical diary, there were some spells of numbness on 1 side and a spell of being unsteady and having a slight fall, but during this time, the patient did not have any clear electrographic abnormality seen to

## 2025-01-20 ENCOUNTER — TELEPHONE (OUTPATIENT)
Age: 80
End: 2025-01-20

## 2025-01-20 NOTE — TELEPHONE ENCOUNTER
Samantha calling from [facility] is asking us to sign a medical clearance to hold blood thinners because patient is having an colonoscopy on January 23, 2025.     They are faxing this form over to us right now, and they would like it sent back by tomorrow.      [phone]  PHONE

## 2025-01-21 ENCOUNTER — CLINICAL DOCUMENTATION (OUTPATIENT)
Age: 80
End: 2025-01-21

## 2025-01-24 ENCOUNTER — CLINICAL DOCUMENTATION (OUTPATIENT)
Age: 80
End: 2025-01-24

## 2025-01-24 NOTE — PROGRESS NOTES
Faxed clearance letter to [facility]. Received result success. Sent to the front for scanning.   
[Negative] : Psychiatric

## 2025-02-28 RX ORDER — CLOPIDOGREL BISULFATE 75 MG/1
75 TABLET ORAL DAILY
Qty: 90 TABLET | Refills: 3 | Status: SHIPPED | OUTPATIENT
Start: 2025-02-28

## 2025-03-03 ENCOUNTER — TELEPHONE (OUTPATIENT)
Age: 80
End: 2025-03-03

## 2025-03-03 ENCOUNTER — OFFICE VISIT (OUTPATIENT)
Age: 80
End: 2025-03-03
Payer: MEDICARE

## 2025-03-03 VITALS
SYSTOLIC BLOOD PRESSURE: 130 MMHG | HEIGHT: 65 IN | RESPIRATION RATE: 18 BRPM | HEART RATE: 72 BPM | BODY MASS INDEX: 30.59 KG/M2 | DIASTOLIC BLOOD PRESSURE: 78 MMHG | OXYGEN SATURATION: 96 % | WEIGHT: 183.6 LBS

## 2025-03-03 DIAGNOSIS — I65.23 BILATERAL CAROTID ARTERY STENOSIS: Primary | ICD-10-CM

## 2025-03-03 DIAGNOSIS — Z86.73 HISTORY OF STROKE: ICD-10-CM

## 2025-03-03 PROCEDURE — G8399 PT W/DXA RESULTS DOCUMENT: HCPCS | Performed by: PSYCHIATRY & NEUROLOGY

## 2025-03-03 PROCEDURE — 1126F AMNT PAIN NOTED NONE PRSNT: CPT | Performed by: PSYCHIATRY & NEUROLOGY

## 2025-03-03 PROCEDURE — 1090F PRES/ABSN URINE INCON ASSESS: CPT | Performed by: PSYCHIATRY & NEUROLOGY

## 2025-03-03 PROCEDURE — 99213 OFFICE O/P EST LOW 20 MIN: CPT | Performed by: PSYCHIATRY & NEUROLOGY

## 2025-03-03 PROCEDURE — 1036F TOBACCO NON-USER: CPT | Performed by: PSYCHIATRY & NEUROLOGY

## 2025-03-03 PROCEDURE — 1159F MED LIST DOCD IN RCRD: CPT | Performed by: PSYCHIATRY & NEUROLOGY

## 2025-03-03 PROCEDURE — G8427 DOCREV CUR MEDS BY ELIG CLIN: HCPCS | Performed by: PSYCHIATRY & NEUROLOGY

## 2025-03-03 PROCEDURE — 3078F DIAST BP <80 MM HG: CPT | Performed by: PSYCHIATRY & NEUROLOGY

## 2025-03-03 PROCEDURE — 1160F RVW MEDS BY RX/DR IN RCRD: CPT | Performed by: PSYCHIATRY & NEUROLOGY

## 2025-03-03 PROCEDURE — 1123F ACP DISCUSS/DSCN MKR DOCD: CPT | Performed by: PSYCHIATRY & NEUROLOGY

## 2025-03-03 PROCEDURE — 3075F SYST BP GE 130 - 139MM HG: CPT | Performed by: PSYCHIATRY & NEUROLOGY

## 2025-03-03 PROCEDURE — G8417 CALC BMI ABV UP PARAM F/U: HCPCS | Performed by: PSYCHIATRY & NEUROLOGY

## 2025-03-03 RX ORDER — ESTRADIOL 0.1 MG/G
2 CREAM VAGINAL DAILY
COMMUNITY
Start: 2024-08-28

## 2025-03-03 ASSESSMENT — PATIENT HEALTH QUESTIONNAIRE - PHQ9
1. LITTLE INTEREST OR PLEASURE IN DOING THINGS: SEVERAL DAYS
SUM OF ALL RESPONSES TO PHQ QUESTIONS 1-9: 2
2. FEELING DOWN, DEPRESSED OR HOPELESS: SEVERAL DAYS
SUM OF ALL RESPONSES TO PHQ QUESTIONS 1-9: 2

## 2025-03-03 NOTE — TELEPHONE ENCOUNTER
Patient is confused about her appts with Dr. Lopez. She doesn't know why she needs them. She thought she was getting everything taken care of through Dr. Miller, but is looking for more information on the appts with Dr. Lopez first. Please call her at . Thank you.

## 2025-03-03 NOTE — PROGRESS NOTES
Chief Complaint   Patient presents with    Neurologic Problem     Hx of CVA - doing well -     Depression     With the anniversary of  death- normally doesn't have to make all the decisions by herself      1. Have you been to the ER, urgent care clinic since your last visit?  Hospitalized since your last visit? No     2. Have you seen or consulted any other health care providers outside of the [facility] since your last visit?  Include any pap smears or colon screening.  Yes PCP

## 2025-03-03 NOTE — TELEPHONE ENCOUNTER
Returned call to patient. Informed she was referred by her neurologist Dr Miller for cognitive testing due to memory loss. Explained neuropsych testing is a tool similar to an xray or mri that assist the referring provider with her treatment and care. Informed the testing she completes will look at her memory, attention, and problem-solving skills. She will see words, pictures, puzzles, and answer some questionnaires. Patient expressed understanding and thanked me for calling.

## 2025-03-03 NOTE — PROGRESS NOTES
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  Patient previously seen for evaluation after head injury and concussion when she passed out after GI procedure in August 2024 and had a postconcussive syndrome.  She is doing much better now.  She says she has completely recovered.    Subjective:     Marline Ellsworth is a 79 y.o. right-handed  female that we are seeing at the request of Dr. 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  Patient previously seen for evaluation after head injury and concussion when she passed out after GI procedure in August 2024 and had a postconcussive syndrome.  She is doing much better now.  She says she has completely recovered.  She had complained of memory loss after the injury, and we sent her to neuropsych testing but she says today that her memory is stable and was oriented x 4 and seem to be without much trouble.  She still has a neuropsych testing scheduled in April. She scored 30 out of 30 on her Mini-Mental status examination today in the office.  The daughter has some concern and wants to just get a baseline check.  The patient has a previous bipolar disorder in addition.  She is a borderline diabetic and a smoker and has 3 of the 4

## 2025-04-14 NOTE — PROGRESS NOTES
Intake Note      Patient Name: Marline Ellsworth  YOB: 1945    Age: 80 y.o.  Date of Intake: 4/15/2025   Education: 16 Ethnicity White   Gender: Female Referring Provider: Dr. Miller     REASON FOR REFERRAL AND EVALUATION PROCEDURES:  Marline Ellsworth  was referred for evaluation by her Neurologist to assist in differential diagnosis and individualized treatment planning. she understood the rationale and procedures for evaluation, as well as the limits to confidentiality, and agreed to participate. she consented to have this report made available to her  treating providers through her  electronic medical records.   History Sources: Patient and Medical Record    HISTORY OF PRESENT ILLNESS:  The patient is an 80-year-old female with pertinent medical history noted for stroke, basilar artery occlusion with cerebral infarction, benign essential hypertension, bilateral carotid artery stenosis, mixed hyperlipidemia, bladder cancer, depression, left pontine stroke, obstructive sleep apnea, disturbance of memory, convulsive syncope, and acute alteration in mental status.  She presented independently for clinical interview and appeared to be an adequate historian.  Per the patient's report, she notices subtle changes in her cognitive functioning but she is uncertain how long they have been going on and whether they are stable or progressive.  She described them to include attention deficits (e.g., misplacing personal belongings) and word finding inefficiencies.  The patient mentioned her stroke occurred in 2019 but it did not result in any lasting cognitive or physical sequela.  She also reported history of two head injuries, occurring in 2009 and 2024.  Both of these head injuries occurred when she fell off the toilet.  She stated these events occurred within the context of medication changes and she denied lasting cognitive or physical sequela related to these events as well.    Pertaining to the patient's psychiatric

## 2025-04-15 ENCOUNTER — OFFICE VISIT (OUTPATIENT)
Age: 80
End: 2025-04-15
Payer: MEDICARE

## 2025-04-15 DIAGNOSIS — R41.3 DISTURBANCE OF MEMORY: Primary | ICD-10-CM

## 2025-04-15 DIAGNOSIS — F31.75 BIPOLAR DISORDER, IN PARTIAL REMISSION, MOST RECENT EPISODE DEPRESSED (HCC): ICD-10-CM

## 2025-04-15 PROCEDURE — 90791 PSYCH DIAGNOSTIC EVALUATION: CPT | Performed by: CLINICAL NEUROPSYCHOLOGIST

## 2025-04-21 ENCOUNTER — PROCEDURE VISIT (OUTPATIENT)
Age: 80
End: 2025-04-21
Payer: MEDICARE

## 2025-04-21 DIAGNOSIS — R41.3 DISTURBANCE OF MEMORY: Primary | ICD-10-CM

## 2025-04-21 DIAGNOSIS — G47.33 OSA (OBSTRUCTIVE SLEEP APNEA): ICD-10-CM

## 2025-04-21 DIAGNOSIS — F31.75 BIPOLAR DISORDER, IN PARTIAL REMISSION, MOST RECENT EPISODE DEPRESSED (HCC): ICD-10-CM

## 2025-04-21 PROCEDURE — 96138 PSYCL/NRPSYC TECH 1ST: CPT | Performed by: CLINICAL NEUROPSYCHOLOGIST

## 2025-04-21 PROCEDURE — 96133 NRPSYC TST EVAL PHYS/QHP EA: CPT | Performed by: CLINICAL NEUROPSYCHOLOGIST

## 2025-04-21 PROCEDURE — 96139 PSYCL/NRPSYC TST TECH EA: CPT | Performed by: CLINICAL NEUROPSYCHOLOGIST

## 2025-04-21 PROCEDURE — 96132 NRPSYC TST EVAL PHYS/QHP 1ST: CPT | Performed by: CLINICAL NEUROPSYCHOLOGIST

## 2025-05-02 ENCOUNTER — CLINICAL DOCUMENTATION (OUTPATIENT)
Age: 80
End: 2025-05-02

## 2025-05-02 NOTE — PROGRESS NOTES
Neuropsychological Evaluation Report      Patient Name: Marline Ellsworth  YOB: 1945    Age: 80 y.o.  Date of Intake: 4/15/2025   Education: 16 Date of Testin2025   Gender: Female Ethnicity: White     Referring Provider: Dr. Miller     REASON FOR REFERRAL AND EVALUATION PROCEDURES:  Marline Ellsworth  was referred for neuropsychological evaluation by her Neurologist to obtain a quantitative assessment of her current level of neurocognitive functioning, assist in differential diagnosis, and aid in individualized treatment planning. The patient understood the rationale and procedures for evaluation, as well as the limits to confidentiality, and they agreed to participate. The patient consented to have this report made available to her  treating providers through her  electronic medical records. This evaluation was completed with the patient by Jamison Lopez PsyD with the exception of testing by technician, which was completed by ELVIA Del Valle under the supervision of Dr. Lopez.  History Sources: Patient, Medical Record, and Test Data    SUMMARY AND IMPRESSION:  The following section is a summary of the patient's pertinent test results and impressions. A more thorough review of the patient's background and test scores can be found below.    Patient's neuropsychological evaluation revealed her cognitive functioning is within normal limits for aging and scores are commensurate with her estimated baseline.  Brief assessment of psychopathology did not reveal clinically significant symptoms. Screening for sleep problems revealed high daytime fatigue and clinically significant poor sleep quality.  This finding is likely due to her currently untreated obstructive sleep apnea.        ASSESSMENT:  Disturbance of memory - NOT SUPPORTED  Bipolar disorder  Sleep apnea - untreated    RECOMMENDATIONS:  The patient currently has a feedback session scheduled for 2025. During this appointment, the results of the

## 2025-05-05 ENCOUNTER — OFFICE VISIT (OUTPATIENT)
Age: 80
End: 2025-05-05
Payer: MEDICARE

## 2025-05-05 DIAGNOSIS — G47.33 OSA (OBSTRUCTIVE SLEEP APNEA): ICD-10-CM

## 2025-05-05 DIAGNOSIS — R41.3 DISTURBANCE OF MEMORY: Primary | ICD-10-CM

## 2025-05-05 DIAGNOSIS — F31.75 BIPOLAR DISORDER, IN PARTIAL REMISSION, MOST RECENT EPISODE DEPRESSED (HCC): ICD-10-CM

## 2025-05-05 PROCEDURE — 96132 NRPSYC TST EVAL PHYS/QHP 1ST: CPT | Performed by: CLINICAL NEUROPSYCHOLOGIST

## 2025-05-05 NOTE — PROGRESS NOTES
Chief complaint: Patient presented for review of previously completed neuropsychological evaluation and discussion of impressions/recommendations.    Prior to seeing the patient for today's visit, I reviewed pertinent records, including the previously completed report, the records in Epic, and any updated visits from other providers since the patient's last visit.    I provided feedback services related to the previously completed report. Attendees included: Patient and Children. Education was provided regarding my diagnostic impressions, and we discussed treatment plan/options. Attendees were provided with the opportunity to ask questions, which were answered to the best of my ability.    We discussed, in detail, the following:  Reviewed findings from evaluation including test results, diagnosis, and suspected contributing factors  Discussed recommendations outlined in report  Answered questions to the best of my ability    The patient needs to:   Follow up with referring provider for ongoing management, Use behavioral strategies to improve sleep (See handout), and Emphasize modifiable risk and protective factors for cognitive functioning (e.g., exercise, diet, cognitive stimulation; see handout)    The patient had the following reactions to recommendations: Patient and daughter reported understanding results and recommendations.  Patient stated she was encouraged by findings.    ASSESSMENT:   Diagnosis Orders   1. Disturbance of memory - NOT SUPPORTED        2. Bipolar disorder, in partial remission, most recent episode depressed (HCC)        3. MIMI (obstructive sleep apnea) - untreated            TIME SPENT PROVIDING SERVICES:   31 minutes     BILLING:  96132 x 1 Units    *Code 30035 Neuropsychological testing evaluation services include: Integration of patient data, interpretation of standardized test results and clinical data, clinical decision-making, treatment planning and report, and interactive feedback to

## 2025-06-02 ENCOUNTER — TELEPHONE (OUTPATIENT)
Age: 80
End: 2025-06-02

## 2025-06-02 NOTE — TELEPHONE ENCOUNTER
Estephania with [employer] is calling to say she faxed a form to us twice already on May 23rd and again on May 27th that they need Dr. Miller to sign and fax back to us.     She stated she will fax it one more time today in case we did not receive it.     PHONE    [phone]

## 2025-06-05 NOTE — TELEPHONE ENCOUNTER
Estephania would like to know the status of the lab and that she would will be faxing it over again. Please give a call at [phone]

## 2025-06-11 NOTE — TELEPHONE ENCOUNTER
Estephania stroud/ [employer] called again for status update. She advised she is refaxing the form over to us now. Please advise.    PHONE    [phone]

## 2025-08-26 ENCOUNTER — TRANSCRIBE ORDERS (OUTPATIENT)
Facility: HOSPITAL | Age: 80
End: 2025-08-26

## 2025-08-26 ENCOUNTER — HOSPITAL ENCOUNTER (OUTPATIENT)
Facility: HOSPITAL | Age: 80
Discharge: HOME | End: 2025-08-29
Payer: MEDICARE

## 2025-08-26 DIAGNOSIS — R06.02 SHORTNESS OF BREATH: ICD-10-CM

## 2025-08-26 DIAGNOSIS — R06.02 SHORTNESS OF BREATH: Primary | ICD-10-CM

## 2025-08-26 PROCEDURE — 71046 X-RAY EXAM CHEST 2 VIEWS: CPT

## 2025-09-03 ENCOUNTER — TELEPHONE (OUTPATIENT)
Age: 80
End: 2025-09-03